# Patient Record
Sex: MALE | Race: WHITE | NOT HISPANIC OR LATINO | Employment: UNEMPLOYED | URBAN - METROPOLITAN AREA
[De-identification: names, ages, dates, MRNs, and addresses within clinical notes are randomized per-mention and may not be internally consistent; named-entity substitution may affect disease eponyms.]

---

## 2019-01-09 ENCOUNTER — APPOINTMENT (EMERGENCY)
Dept: CT IMAGING | Facility: HOSPITAL | Age: 74
DRG: 871 | End: 2019-01-09
Payer: COMMERCIAL

## 2019-01-09 ENCOUNTER — APPOINTMENT (INPATIENT)
Dept: RADIOLOGY | Facility: HOSPITAL | Age: 74
DRG: 871 | End: 2019-01-09
Payer: COMMERCIAL

## 2019-01-09 ENCOUNTER — APPOINTMENT (INPATIENT)
Dept: CT IMAGING | Facility: HOSPITAL | Age: 74
DRG: 871 | End: 2019-01-09
Payer: COMMERCIAL

## 2019-01-09 ENCOUNTER — HOSPITAL ENCOUNTER (INPATIENT)
Facility: HOSPITAL | Age: 74
LOS: 13 days | Discharge: NURSING, REHAB OR LONG-TERM CARE | DRG: 871 | End: 2019-01-22
Attending: EMERGENCY MEDICINE | Admitting: INTERNAL MEDICINE
Payer: COMMERCIAL

## 2019-01-09 DIAGNOSIS — R77.8 ELEVATED TROPONIN I LEVEL: ICD-10-CM

## 2019-01-09 DIAGNOSIS — N40.1 URINARY RETENTION DUE TO BENIGN PROSTATIC HYPERPLASIA: ICD-10-CM

## 2019-01-09 DIAGNOSIS — R77.8 ELEVATED TROPONIN: ICD-10-CM

## 2019-01-09 DIAGNOSIS — R65.10 SIRS (SYSTEMIC INFLAMMATORY RESPONSE SYNDROME) (HCC): ICD-10-CM

## 2019-01-09 DIAGNOSIS — R33.8 URINARY RETENTION DUE TO BENIGN PROSTATIC HYPERPLASIA: ICD-10-CM

## 2019-01-09 DIAGNOSIS — A41.9 SEPSIS, DUE TO UNSPECIFIED ORGANISM: ICD-10-CM

## 2019-01-09 DIAGNOSIS — R06.02 SOB (SHORTNESS OF BREATH): Primary | ICD-10-CM

## 2019-01-09 DIAGNOSIS — R53.83 FATIGUE: ICD-10-CM

## 2019-01-09 DIAGNOSIS — N39.0 UTI (URINARY TRACT INFECTION): ICD-10-CM

## 2019-01-09 DIAGNOSIS — R50.9 FEVER: ICD-10-CM

## 2019-01-09 DIAGNOSIS — A41.9 SEPSIS (HCC): ICD-10-CM

## 2019-01-09 PROBLEM — E87.2 LACTIC ACIDOSIS: Status: ACTIVE | Noted: 2019-01-09

## 2019-01-09 PROBLEM — N30.00 ACUTE CYSTITIS WITHOUT HEMATURIA: Status: ACTIVE | Noted: 2019-01-09

## 2019-01-09 PROBLEM — Z72.0 TOBACCO ABUSE: Chronic | Status: ACTIVE | Noted: 2019-01-09

## 2019-01-09 LAB
ALBUMIN SERPL BCP-MCNC: 3.4 G/DL (ref 3.5–5)
ALP SERPL-CCNC: 139 U/L (ref 46–116)
ALT SERPL W P-5'-P-CCNC: 32 U/L (ref 12–78)
ANION GAP BLD CALC-SCNC: 21 MMOL/L (ref 4–13)
ANION GAP SERPL CALCULATED.3IONS-SCNC: 14 MMOL/L (ref 4–13)
ANION GAP SERPL CALCULATED.3IONS-SCNC: 15 MMOL/L (ref 4–13)
APTT PPP: 31 SECONDS (ref 26–38)
ARTERIAL PATENCY WRIST A: YES
AST SERPL W P-5'-P-CCNC: 26 U/L (ref 5–45)
BACTERIA UR QL AUTO: ABNORMAL /HPF
BASE EXCESS BLDA CALC-SCNC: -3.2 MMOL/L
BASOPHILS # BLD AUTO: 0.01 THOUSANDS/ΜL (ref 0–0.1)
BASOPHILS # BLD AUTO: 0.02 THOUSANDS/ΜL (ref 0–0.1)
BASOPHILS NFR BLD AUTO: 0 % (ref 0–1)
BASOPHILS NFR BLD AUTO: 0 % (ref 0–1)
BILIRUB SERPL-MCNC: 0.82 MG/DL (ref 0.2–1)
BILIRUB UR QL STRIP: NEGATIVE
BUN BLD-MCNC: 12 MG/DL (ref 5–25)
BUN SERPL-MCNC: 14 MG/DL (ref 5–25)
BUN SERPL-MCNC: 16 MG/DL (ref 5–25)
CA-I BLD-SCNC: 1.13 MMOL/L (ref 1.12–1.32)
CALCIUM SERPL-MCNC: 8.6 MG/DL (ref 8.3–10.1)
CALCIUM SERPL-MCNC: 9 MG/DL (ref 8.3–10.1)
CHLORIDE BLD-SCNC: 96 MMOL/L (ref 100–108)
CHLORIDE SERPL-SCNC: 101 MMOL/L (ref 100–108)
CHLORIDE SERPL-SCNC: 95 MMOL/L (ref 100–108)
CLARITY UR: ABNORMAL
CO2 SERPL-SCNC: 16 MMOL/L (ref 21–32)
CO2 SERPL-SCNC: 25 MMOL/L (ref 21–32)
COLOR UR: YELLOW
CREAT BLD-MCNC: 0.8 MG/DL (ref 0.6–1.3)
CREAT SERPL-MCNC: 1.05 MG/DL (ref 0.6–1.3)
CREAT SERPL-MCNC: 1.29 MG/DL (ref 0.6–1.3)
EOSINOPHIL # BLD AUTO: 0.01 THOUSAND/ΜL (ref 0–0.61)
EOSINOPHIL # BLD AUTO: 0.05 THOUSAND/ΜL (ref 0–0.61)
EOSINOPHIL NFR BLD AUTO: 0 % (ref 0–6)
EOSINOPHIL NFR BLD AUTO: 2 % (ref 0–6)
ERYTHROCYTE [DISTWIDTH] IN BLOOD BY AUTOMATED COUNT: 12.8 % (ref 11.6–15.1)
ERYTHROCYTE [DISTWIDTH] IN BLOOD BY AUTOMATED COUNT: 12.9 % (ref 11.6–15.1)
FLUAV AG SPEC QL IA: NEGATIVE
FLUAV AG SPEC QL: NORMAL
FLUBV AG SPEC QL IA: NEGATIVE
FLUBV AG SPEC QL: NORMAL
GFR SERPL CREATININE-BSD FRML MDRD: 55 ML/MIN/1.73SQ M
GFR SERPL CREATININE-BSD FRML MDRD: 71 ML/MIN/1.73SQ M
GFR SERPL CREATININE-BSD FRML MDRD: 90 ML/MIN/1.73SQ M
GLUCOSE SERPL-MCNC: 132 MG/DL (ref 65–140)
GLUCOSE SERPL-MCNC: 140 MG/DL (ref 65–140)
GLUCOSE SERPL-MCNC: 149 MG/DL (ref 65–140)
GLUCOSE SERPL-MCNC: 170 MG/DL (ref 65–140)
GLUCOSE SERPL-MCNC: 208 MG/DL (ref 65–140)
GLUCOSE SERPL-MCNC: 212 MG/DL (ref 65–140)
GLUCOSE UR STRIP-MCNC: ABNORMAL MG/DL
HCO3 BLDA-SCNC: 19.8 MMOL/L (ref 22–28)
HCT VFR BLD AUTO: 37.6 % (ref 36.5–49.3)
HCT VFR BLD AUTO: 42 % (ref 36.5–49.3)
HCT VFR BLD CALC: 43 % (ref 36.5–49.3)
HGB BLD-MCNC: 12.6 G/DL (ref 12–17)
HGB BLD-MCNC: 14.3 G/DL (ref 12–17)
HGB BLDA-MCNC: 14.6 G/DL (ref 12–17)
HGB UR QL STRIP.AUTO: ABNORMAL
IMM GRANULOCYTES # BLD AUTO: 0 THOUSAND/UL (ref 0–0.2)
IMM GRANULOCYTES # BLD AUTO: 0.08 THOUSAND/UL (ref 0–0.2)
IMM GRANULOCYTES NFR BLD AUTO: 0 % (ref 0–2)
IMM GRANULOCYTES NFR BLD AUTO: 1 % (ref 0–2)
INR PPP: 1.16 (ref 0.86–1.17)
KETONES UR STRIP-MCNC: NEGATIVE MG/DL
LACTATE SERPL-SCNC: 2.4 MMOL/L (ref 0.5–2)
LACTATE SERPL-SCNC: 2.7 MMOL/L (ref 0.5–2)
LACTATE SERPL-SCNC: 2.9 MMOL/L (ref 0.5–2)
LACTATE SERPL-SCNC: 2.9 MMOL/L (ref 0.5–2)
LACTATE SERPL-SCNC: 3 MMOL/L (ref 0.5–2)
LACTATE SERPL-SCNC: 3.1 MMOL/L (ref 0.5–2)
LACTATE SERPL-SCNC: 3.1 MMOL/L (ref 0.5–2)
LACTATE SERPL-SCNC: 4 MMOL/L (ref 0.5–2)
LACTATE SERPL-SCNC: 4.7 MMOL/L (ref 0.5–2)
LACTATE SERPL-SCNC: 4.8 MMOL/L (ref 0.5–2)
LEUKOCYTE ESTERASE UR QL STRIP: ABNORMAL
LYMPHOCYTES # BLD AUTO: 0.22 THOUSANDS/ΜL (ref 0.6–4.47)
LYMPHOCYTES # BLD AUTO: 0.48 THOUSANDS/ΜL (ref 0.6–4.47)
LYMPHOCYTES NFR BLD AUTO: 18 % (ref 14–44)
LYMPHOCYTES NFR BLD AUTO: 3 % (ref 14–44)
MCH RBC QN AUTO: 31.8 PG (ref 26.8–34.3)
MCH RBC QN AUTO: 31.9 PG (ref 26.8–34.3)
MCHC RBC AUTO-ENTMCNC: 33.5 G/DL (ref 31.4–37.4)
MCHC RBC AUTO-ENTMCNC: 34 G/DL (ref 31.4–37.4)
MCV RBC AUTO: 94 FL (ref 82–98)
MCV RBC AUTO: 95 FL (ref 82–98)
MONOCYTES # BLD AUTO: 0.02 THOUSAND/ΜL (ref 0.17–1.22)
MONOCYTES # BLD AUTO: 0.06 THOUSAND/ΜL (ref 0.17–1.22)
MONOCYTES NFR BLD AUTO: 1 % (ref 4–12)
MONOCYTES NFR BLD AUTO: 1 % (ref 4–12)
NASAL CANNULA: 3
NEUTROPHILS # BLD AUTO: 2.06 THOUSANDS/ΜL (ref 1.85–7.62)
NEUTROPHILS # BLD AUTO: 7.07 THOUSANDS/ΜL (ref 1.85–7.62)
NEUTS SEG NFR BLD AUTO: 79 % (ref 43–75)
NEUTS SEG NFR BLD AUTO: 95 % (ref 43–75)
NITRITE UR QL STRIP: NEGATIVE
NON-SQ EPI CELLS URNS QL MICRO: ABNORMAL /HPF
NRBC BLD AUTO-RTO: 0 /100 WBCS
NRBC BLD AUTO-RTO: 0 /100 WBCS
NT-PROBNP SERPL-MCNC: 2206 PG/ML
O2 CT BLDA-SCNC: 17.4 ML/DL (ref 16–23)
OXYHGB MFR BLDA: 95.3 % (ref 94–97)
PCO2 BLD: 23 MMOL/L (ref 21–32)
PCO2 BLDA: 29.7 MM HG (ref 36–44)
PH BLDA: 7.44 [PH] (ref 7.35–7.45)
PH UR STRIP.AUTO: 6.5 [PH] (ref 4.5–8)
PLATELET # BLD AUTO: 144 THOUSANDS/UL (ref 149–390)
PLATELET # BLD AUTO: 168 THOUSANDS/UL (ref 149–390)
PMV BLD AUTO: 8.7 FL (ref 8.9–12.7)
PMV BLD AUTO: 9.3 FL (ref 8.9–12.7)
PO2 BLDA: 114.6 MM HG (ref 75–129)
POTASSIUM BLD-SCNC: 3.4 MMOL/L (ref 3.5–5.3)
POTASSIUM SERPL-SCNC: 3.1 MMOL/L (ref 3.5–5.3)
POTASSIUM SERPL-SCNC: 3.5 MMOL/L (ref 3.5–5.3)
PROCALCITONIN SERPL-MCNC: 42.51 NG/ML
PROCALCITONIN SERPL-MCNC: 91.49 NG/ML
PROT SERPL-MCNC: 7.4 G/DL (ref 6.4–8.2)
PROT UR STRIP-MCNC: ABNORMAL MG/DL
PROTHROMBIN TIME: 14.9 SECONDS (ref 11.8–14.2)
RBC # BLD AUTO: 3.96 MILLION/UL (ref 3.88–5.62)
RBC # BLD AUTO: 4.48 MILLION/UL (ref 3.88–5.62)
RBC #/AREA URNS AUTO: ABNORMAL /HPF
RSV B RNA SPEC QL NAA+PROBE: NORMAL
SODIUM BLD-SCNC: 136 MMOL/L (ref 136–145)
SODIUM SERPL-SCNC: 132 MMOL/L (ref 136–145)
SODIUM SERPL-SCNC: 134 MMOL/L (ref 136–145)
SP GR UR STRIP.AUTO: 1.02 (ref 1–1.03)
SPECIMEN SOURCE: ABNORMAL
SPECIMEN SOURCE: ABNORMAL
TROPONIN I SERPL-MCNC: 0.13 NG/ML
TROPONIN I SERPL-MCNC: 10.51 NG/ML
TROPONIN I SERPL-MCNC: 11.55 NG/ML
TROPONIN I SERPL-MCNC: 4.25 NG/ML
UROBILINOGEN UR QL STRIP.AUTO: 0.2 E.U./DL
WBC # BLD AUTO: 2.62 THOUSAND/UL (ref 4.31–10.16)
WBC # BLD AUTO: 7.46 THOUSAND/UL (ref 4.31–10.16)
WBC #/AREA URNS AUTO: ABNORMAL /HPF

## 2019-01-09 PROCEDURE — 80047 BASIC METABLC PNL IONIZED CA: CPT

## 2019-01-09 PROCEDURE — 93005 ELECTROCARDIOGRAM TRACING: CPT

## 2019-01-09 PROCEDURE — 85025 COMPLETE CBC W/AUTO DIFF WBC: CPT | Performed by: EMERGENCY MEDICINE

## 2019-01-09 PROCEDURE — 83880 ASSAY OF NATRIURETIC PEPTIDE: CPT | Performed by: EMERGENCY MEDICINE

## 2019-01-09 PROCEDURE — 80053 COMPREHEN METABOLIC PANEL: CPT | Performed by: EMERGENCY MEDICINE

## 2019-01-09 PROCEDURE — 36600 WITHDRAWAL OF ARTERIAL BLOOD: CPT

## 2019-01-09 PROCEDURE — 83605 ASSAY OF LACTIC ACID: CPT | Performed by: HOSPITALIST

## 2019-01-09 PROCEDURE — 96365 THER/PROPH/DIAG IV INF INIT: CPT

## 2019-01-09 PROCEDURE — 84484 ASSAY OF TROPONIN QUANT: CPT | Performed by: PHYSICIAN ASSISTANT

## 2019-01-09 PROCEDURE — 85025 COMPLETE CBC W/AUTO DIFF WBC: CPT | Performed by: PHYSICIAN ASSISTANT

## 2019-01-09 PROCEDURE — 99223 1ST HOSP IP/OBS HIGH 75: CPT | Performed by: HOSPITALIST

## 2019-01-09 PROCEDURE — 99221 1ST HOSP IP/OBS SF/LOW 40: CPT | Performed by: INTERNAL MEDICINE

## 2019-01-09 PROCEDURE — 96360 HYDRATION IV INFUSION INIT: CPT

## 2019-01-09 PROCEDURE — 84484 ASSAY OF TROPONIN QUANT: CPT | Performed by: EMERGENCY MEDICINE

## 2019-01-09 PROCEDURE — 74176 CT ABD & PELVIS W/O CONTRAST: CPT

## 2019-01-09 PROCEDURE — 80048 BASIC METABOLIC PNL TOTAL CA: CPT | Performed by: PHYSICIAN ASSISTANT

## 2019-01-09 PROCEDURE — 85610 PROTHROMBIN TIME: CPT | Performed by: EMERGENCY MEDICINE

## 2019-01-09 PROCEDURE — 87631 RESP VIRUS 3-5 TARGETS: CPT | Performed by: EMERGENCY MEDICINE

## 2019-01-09 PROCEDURE — 87077 CULTURE AEROBIC IDENTIFY: CPT | Performed by: EMERGENCY MEDICINE

## 2019-01-09 PROCEDURE — 87086 URINE CULTURE/COLONY COUNT: CPT | Performed by: EMERGENCY MEDICINE

## 2019-01-09 PROCEDURE — 71275 CT ANGIOGRAPHY CHEST: CPT

## 2019-01-09 PROCEDURE — 82948 REAGENT STRIP/BLOOD GLUCOSE: CPT

## 2019-01-09 PROCEDURE — 85014 HEMATOCRIT: CPT

## 2019-01-09 PROCEDURE — 71045 X-RAY EXAM CHEST 1 VIEW: CPT

## 2019-01-09 PROCEDURE — 99285 EMERGENCY DEPT VISIT HI MDM: CPT

## 2019-01-09 PROCEDURE — 84484 ASSAY OF TROPONIN QUANT: CPT | Performed by: HOSPITALIST

## 2019-01-09 PROCEDURE — 83605 ASSAY OF LACTIC ACID: CPT | Performed by: EMERGENCY MEDICINE

## 2019-01-09 PROCEDURE — 84145 PROCALCITONIN (PCT): CPT | Performed by: HOSPITALIST

## 2019-01-09 PROCEDURE — 83605 ASSAY OF LACTIC ACID: CPT | Performed by: PHYSICIAN ASSISTANT

## 2019-01-09 PROCEDURE — 85730 THROMBOPLASTIN TIME PARTIAL: CPT | Performed by: EMERGENCY MEDICINE

## 2019-01-09 PROCEDURE — 36415 COLL VENOUS BLD VENIPUNCTURE: CPT | Performed by: EMERGENCY MEDICINE

## 2019-01-09 PROCEDURE — 82805 BLOOD GASES W/O2 SATURATION: CPT | Performed by: EMERGENCY MEDICINE

## 2019-01-09 PROCEDURE — 84145 PROCALCITONIN (PCT): CPT | Performed by: PHYSICIAN ASSISTANT

## 2019-01-09 PROCEDURE — 87040 BLOOD CULTURE FOR BACTERIA: CPT | Performed by: EMERGENCY MEDICINE

## 2019-01-09 PROCEDURE — 87186 SC STD MICRODIL/AGAR DIL: CPT | Performed by: EMERGENCY MEDICINE

## 2019-01-09 PROCEDURE — 96361 HYDRATE IV INFUSION ADD-ON: CPT

## 2019-01-09 PROCEDURE — 99357 PR PROLONGED SERV,INPATIENT,EA ADD 30 MIN: CPT | Performed by: HOSPITALIST

## 2019-01-09 PROCEDURE — 81001 URINALYSIS AUTO W/SCOPE: CPT | Performed by: EMERGENCY MEDICINE

## 2019-01-09 RX ORDER — NICOTINE 21 MG/24HR
1 PATCH, TRANSDERMAL 24 HOURS TRANSDERMAL DAILY
Status: DISCONTINUED | OUTPATIENT
Start: 2019-01-09 | End: 2019-01-22 | Stop reason: HOSPADM

## 2019-01-09 RX ORDER — SODIUM CHLORIDE, SODIUM LACTATE, POTASSIUM CHLORIDE, CALCIUM CHLORIDE 600; 310; 30; 20 MG/100ML; MG/100ML; MG/100ML; MG/100ML
50 INJECTION, SOLUTION INTRAVENOUS CONTINUOUS
Status: DISCONTINUED | OUTPATIENT
Start: 2019-01-09 | End: 2019-01-13

## 2019-01-09 RX ORDER — POTASSIUM CHLORIDE 20 MEQ/1
40 TABLET, EXTENDED RELEASE ORAL ONCE
Status: COMPLETED | OUTPATIENT
Start: 2019-01-09 | End: 2019-01-09

## 2019-01-09 RX ORDER — ACETAMINOPHEN 325 MG/1
650 TABLET ORAL EVERY 6 HOURS PRN
Status: DISCONTINUED | OUTPATIENT
Start: 2019-01-09 | End: 2019-01-22 | Stop reason: HOSPADM

## 2019-01-09 RX ORDER — IBUPROFEN 600 MG/1
600 TABLET ORAL ONCE
Status: COMPLETED | OUTPATIENT
Start: 2019-01-09 | End: 2019-01-09

## 2019-01-09 RX ORDER — HEPARIN SODIUM 5000 [USP'U]/ML
5000 INJECTION, SOLUTION INTRAVENOUS; SUBCUTANEOUS EVERY 8 HOURS SCHEDULED
Status: DISCONTINUED | OUTPATIENT
Start: 2019-01-09 | End: 2019-01-12

## 2019-01-09 RX ORDER — ACETAMINOPHEN 650 MG/1
650 SUPPOSITORY RECTAL ONCE
Status: COMPLETED | OUTPATIENT
Start: 2019-01-09 | End: 2019-01-09

## 2019-01-09 RX ORDER — SODIUM CHLORIDE 9 MG/ML
125 INJECTION, SOLUTION INTRAVENOUS CONTINUOUS
Status: DISCONTINUED | OUTPATIENT
Start: 2019-01-09 | End: 2019-01-09

## 2019-01-09 RX ADMIN — IOHEXOL 85 ML: 350 INJECTION, SOLUTION INTRAVENOUS at 03:13

## 2019-01-09 RX ADMIN — HEPARIN SODIUM 5000 UNITS: 5000 INJECTION INTRAVENOUS; SUBCUTANEOUS at 06:27

## 2019-01-09 RX ADMIN — CEFTRIAXONE 1000 MG: 1 INJECTION, POWDER, FOR SOLUTION INTRAMUSCULAR; INTRAVENOUS at 03:31

## 2019-01-09 RX ADMIN — POTASSIUM CHLORIDE 40 MEQ: 1500 TABLET, EXTENDED RELEASE ORAL at 13:56

## 2019-01-09 RX ADMIN — SODIUM CHLORIDE, SODIUM LACTATE, POTASSIUM CHLORIDE, AND CALCIUM CHLORIDE 125 ML/HR: .6; .31; .03; .02 INJECTION, SOLUTION INTRAVENOUS at 20:25

## 2019-01-09 RX ADMIN — CEFEPIME HYDROCHLORIDE 1000 MG: 1 INJECTION, POWDER, FOR SOLUTION INTRAMUSCULAR; INTRAVENOUS at 10:49

## 2019-01-09 RX ADMIN — SODIUM CHLORIDE 500 ML: 0.9 INJECTION, SOLUTION INTRAVENOUS at 09:19

## 2019-01-09 RX ADMIN — ACETAMINOPHEN 650 MG: 325 TABLET ORAL at 19:29

## 2019-01-09 RX ADMIN — SODIUM CHLORIDE, SODIUM LACTATE, POTASSIUM CHLORIDE, AND CALCIUM CHLORIDE 125 ML/HR: .6; .31; .03; .02 INJECTION, SOLUTION INTRAVENOUS at 14:44

## 2019-01-09 RX ADMIN — CEFEPIME HYDROCHLORIDE 1000 MG: 1 INJECTION, POWDER, FOR SOLUTION INTRAMUSCULAR; INTRAVENOUS at 21:42

## 2019-01-09 RX ADMIN — NICOTINE 1 PATCH: 21 PATCH, EXTENDED RELEASE TRANSDERMAL at 09:12

## 2019-01-09 RX ADMIN — HEPARIN SODIUM 5000 UNITS: 5000 INJECTION INTRAVENOUS; SUBCUTANEOUS at 13:56

## 2019-01-09 RX ADMIN — IBUPROFEN 600 MG: 600 TABLET ORAL at 04:28

## 2019-01-09 RX ADMIN — IOHEXOL 50 ML: 240 INJECTION, SOLUTION INTRATHECAL; INTRAVASCULAR; INTRAVENOUS; ORAL at 17:45

## 2019-01-09 RX ADMIN — SODIUM CHLORIDE 1000 ML: 0.9 INJECTION, SOLUTION INTRAVENOUS at 02:40

## 2019-01-09 RX ADMIN — ACETAMINOPHEN 650 MG: 650 SUPPOSITORY RECTAL at 02:45

## 2019-01-09 RX ADMIN — SODIUM CHLORIDE, SODIUM LACTATE, POTASSIUM CHLORIDE, AND CALCIUM CHLORIDE 75 ML/HR: .6; .31; .03; .02 INJECTION, SOLUTION INTRAVENOUS at 05:58

## 2019-01-09 RX ADMIN — INSULIN LISPRO 1 UNITS: 100 INJECTION, SOLUTION INTRAVENOUS; SUBCUTANEOUS at 12:52

## 2019-01-09 RX ADMIN — HEPARIN SODIUM 5000 UNITS: 5000 INJECTION INTRAVENOUS; SUBCUTANEOUS at 21:42

## 2019-01-09 RX ADMIN — SODIUM CHLORIDE 1000 ML: 0.9 INJECTION, SOLUTION INTRAVENOUS at 03:38

## 2019-01-09 NOTE — CONSULTS
Consult - Cardiology   Yossi Triana 72 y o  male MRN: [id number]  Unit/Bed#: E4 -01 Encounter: [id number]        Reason For Consult:  Elevated troponin               Assessment and Plan:     1  SIRS-Probable sepsis:  Ongoing workup with undefined source thus far  2  Elevated troponin/non STEMI - probably type 2 secondary to demand ischemia in the setting of sepsis and underlying CAD  3  CAD, remote CABG x2 (1990s):  Details of CABG unclear  Current CT scan suggest significant coronary calcifications  4  Right bundle branch block  5  Possible cardiomyopathy:  Patient offers suggestion of LV dysfunction though details unknown and no records currently available    · Will request records of [facility] for insight into the patient's cardiac history  · Patient reports having had an echocardiogram in the last few months at the office of his usual cardiologist - as such we will defer immediately repeating one unless the patient should have some other sign of ACS or hemodynamic instability  · In the absence of chest pain and ECG changes would not heparinize at this time  Continue aspirin  No beta-blocker in the setting of hypotension  · Check ECG now and p r n  for any complaint of chest discomfort  · Pt to be transferred to higher level of care (stepdown ICU) in light of hypotension, lactic acidosis  History Of Present Illness:  Mr Thom Tinsley is a 72-year-old gentleman who is routinely followed by a PCP not affiliated with our [facility]  He is additionally cared for by Dr Nadya Dias of The [address] [place] [address] Drive and has not had prior care in our system and for these reasons there are no records available for detailed review  This gentleman has diabetes, hypertension and he tells me that he had coronary artery bypass grafting x2 in the early 1990s    He is unsure of the vessels bypassed, and while perhaps not entirely reliable, review of his current radiograph does not show obvious signs of internal mammary artery harvest/use  He reports having had no catheterization since his bypass surgery  He suggests the possibility of a cardiomyopathy without greater detail  He denies any known valvular disease or dysrhythmia and states he has not been hospitalized for any cardiac problems in the recent past   He probably has COPD and continues to smoke tobacco currently consuming approximately 1 pack daily (had smoked 3 packs per day at peak consumption)    Presently the patient describes a few days of constipation and what sounds like a 1-2 day history of anal discomfort  Though he struggles to characterize this he states it is more of a perianal discomfort as compared to a deep rectal or perineal discomfort  He has had decreased urinary output and dysuria denying any sign of blood or penile drainage  On the day of admission the patient describes development of subjective fever with shaking chills  As this had escalated he ultimately called an ambulance  On arrival patient's white cell count was 2 6 later repeated at 7 4  Chemistry shows hyponatremia -132  Urinalysis shows cloudy urine with greater than 1000 mg per dL of glucose, 30 mg/dL of protein, moderate blood, a small amount of leukocytes with  innumerable bacteria with negative nitrite  Cardiac biomarkers showed initial troponin of 0 13 followed by 4 25 and most recently 11 5  ProBNP level was 2206  Earlier this morning the patient's temperature apoorva to a peak of 104 1  In the last few hours he has had some hypotension with SBP  and now some rising his lactic acid (4 8)  He denies any recent chest pain though he states he has, for years, intermittently taken sublingual nitrates for jaw pain - typically twice per month        Past Medical History:        Past Medical History:   Diagnosis Date    Diabetes mellitus ([address] )     Hypertension     Past Surgical History:   Procedure Laterality Date    CORONARY ARTERY BYPASS GRAFT          Allergy: No Known Allergies    Medications:       Prior to Admission medications    Not on File       Family History:     History reviewed  No pertinent family history  Social History:       Social History     Social History    Marital status: Unknown     Spouse name: N/A    Number of children: N/A    Years of education: N/A     Social History Main Topics    Smoking status: Current Every Day Smoker    Smokeless tobacco: None    Alcohol use None    Drug use: Unknown    Sexual activity: Not Asked     Other Topics Concern    None     Social History Narrative    None       ROS:  Symptoms per HPI  Mild chronic exertional dyspnea without recent worsening  Chronic and intermittent mild chest and jaw pain-occurring approximately twice monthly for years well managed with intermittent use of sublingual nitrates  One pack per day tobacco use  Nondrinker  He denies abdominal pain, suprapubic distension, back pain  Remainder review of systems is negative    Exam:  General:  Alert, normally conversant, patient appears slightly uncomfortable related to peroneal or perianal discomfort  Perhaps slightly diaphoretic  Head: Normocephalic, atraumatic  Eyes:  EOMI  Pupils - equal, round, reactive to accomodation  No icterus  Normal Conjunctiva  Oropharynx:  Moist without lesion  Neck:  No gross bruit, JVD, thyromegaly, or lymphadenopathy  Heart:  Regular with controlled rate  No rub nor pathologic murmur  Lungs: Moderate excursion air exchange  No rales  Few coarse breath sounds and scant end-expiratory wheezes  Abdomen:  Soft and seemingly nontender with normal bowel sounds  No organomegaly or mass  Lower Limbs:  No edema  Pulses[de-identified]  RLE - DP:  1-2+                 LLE - DP:  1-2+  Musculoskeletal: Independent movement of limbs observed, Formal ROM and strength eval not performed  Neurologic:    Oriented to: person , place, situation       Cranial Nerves: grossly intact - vision, smell, taste, and hearing  were not tested  Motor function:grossly normal, symmetric   Sensation: Was not tested    DATA:      ECG:                 Normal sinus rhythm with ventricular rate approximately 100 beats per minute  Right bundle branch block morphology with no gross ischemic change  Telemetry:  Normal sinus rhythm with heart rate 90s               Weights: Wt Readings from Last 3 Encounters:   01/09/19 71 4 kg (157 lb 6 5 oz)   , Body mass index is 25 41 kg/m²           Lab Studies:      Results from last 7 days  Lab Units 01/09/19  0635 01/09/19 0237   TROPONIN I ng/mL 4 25* 0 13*            Results from last 7 days  Lab Units 01/09/19  0635 01/09/19  0241 01/09/19 0237   WBC Thousand/uL 7 46  --  2 62*   HEMOGLOBIN g/dL 12 6  --  14 3   I STAT HEMOGLOBIN g/dl  --  14 6  --    HEMATOCRIT % 37 6  --  42 0   HEMATOCRIT, ISTAT %  --  43  --    PLATELETS Thousands/uL 144*  --  168   ,   Results from last 7 days  Lab Units 01/09/19  0635 01/09/19  0241 01/09/19 0237   POTASSIUM mmol/L 3 1*  --  3 5   CHLORIDE mmol/L 101  --  95*   CO2 mmol/L 16*  --  25   CO2, I-STAT mmol/L  --  23  --    BUN mg/dL 16  --  14   CREATININE mg/dL 1 29  --  1 05   CALCIUM mg/dL 8 6  --  9 0   ALK PHOS U/L  --   --  139*   ALT U/L  --   --  32   AST U/L  --   --  26   GLUCOSE, ISTAT mg/dl  --  212*  --

## 2019-01-09 NOTE — PROGRESS NOTES
Notified attending of not being able to complete CT scan abd  At this time, but later on due to the close administration of IV dye  The prep will begin around 1500

## 2019-01-09 NOTE — ED PROVIDER NOTES
History  Chief Complaint   Patient presents with    Shortness of Breath     Pt brought in via ems from home with sob that started tonight  Pt has cough, nasal congestion, fever at home  73 yo male who presents with resp distress  Pt is unable to communicate and SOB, lungs seem clear, temp 102 8, RR 36  History provided by:  EMS personnel and patient  History limited by:  Severe respiratory distress   used: No    Shortness of Breath   Severity:  Moderate  Onset quality:  Unable to specify  Associated symptoms: fever        None       Past Medical History:   Diagnosis Date    Diabetes mellitus ([facility] )     Hypertension        Past Surgical History:   Procedure Laterality Date    CORONARY ARTERY BYPASS GRAFT         History reviewed  No pertinent family history  I have reviewed and agree with the history as documented  Social History   Substance Use Topics    Smoking status: Current Every Day Smoker    Smokeless tobacco: Not on file    Alcohol use Not on file        Review of Systems   Unable to perform ROS: Other (septic, grunting, not talking)   Constitutional: Positive for chills, fatigue and fever  Respiratory: Positive for shortness of breath  Physical Exam  Physical Exam   Constitutional:   Disheveled, smells of stale urine   HENT:   MM tachy   Neck: Neck supple  Cardiovascular: Tachycardia present  No murmur heard  Pulmonary/Chest: He is in respiratory distress (tachypnea)  Lungs clear   Abdominal: There is no tenderness  Musculoskeletal: He exhibits no edema  Neurological: He is alert  Skin: Capillary refill takes less than 2 seconds  Nursing note and vitals reviewed        Vital Signs  ED Triage Vitals   Temperature Pulse Respirations Blood Pressure SpO2   01/09/19 0233 01/09/19 0233 01/09/19 0233 01/09/19 0233 01/09/19 0233   (!) 102 8 °F (39 3 °C) 105 (!) 32 147/65 92 %      Temp Source Heart Rate Source Patient Position - Orthostatic VS BP Location FiO2 (%)   01/09/19 0233 01/09/19 0233 01/09/19 0455 01/09/19 0233 --   Oral Monitor Lying Right arm       Pain Score       01/09/19 0455       No Pain           Vitals:    01/10/19 0207 01/10/19 0241 01/10/19 0307 01/10/19 0335   BP: 121/54  130/62    Pulse:  92  96   Patient Position - Orthostatic VS:           Visual Acuity  Visual Acuity      Most Recent Value   L Pupil Size (mm)  2   R Pupil Size (mm)  2   L Pupil Shape  Round   R Pupil Shape  Round          ED Medications  Medications   lactated ringers infusion (125 mL/hr Intravenous New Bag 1/10/19 0029)   nicotine (NICODERM CQ) 21 mg/24 hr TD 24 hr patch 1 patch ( Transdermal MAR Unhold 1/9/19 1115)   heparin (porcine) subcutaneous injection 5,000 Units (5,000 Units Subcutaneous Given 1/9/19 2142)   acetaminophen (TYLENOL) tablet 650 mg (650 mg Oral Given 1/9/19 1929)   cefepime (MAXIPIME) 1,000 mg in dextrose 5 % 50 mL IVPB (0 mg Intravenous Stopped 1/9/19 2232)   insulin lispro (HumaLOG) 100 units/mL subcutaneous injection 1-5 Units (1 Units Subcutaneous Not Given 1/9/19 1629)   insulin lispro (HumaLOG) 100 units/mL subcutaneous injection 1-5 Units (1 Units Subcutaneous Not Given 1/9/19 2142)   sodium chloride 0 9 % bolus 1,000 mL (0 mL Intravenous Stopped 1/9/19 0347)   acetaminophen (TYLENOL) rectal suppository 650 mg (650 mg Rectal Given 1/9/19 0245)   iohexol (OMNIPAQUE) 350 MG/ML injection (SINGLE-DOSE) 85 mL (85 mL Intravenous Given 1/9/19 0313)   ceftriaxone (ROCEPHIN) 1 g/50 mL in dextrose IVPB (0 mg Intravenous Stopped 1/9/19 0401)   sodium chloride 0 9 % bolus 1,000 mL (0 mL Intravenous Stopped 1/9/19 0428)   ibuprofen (MOTRIN) tablet 600 mg (600 mg Oral Given 1/9/19 0428)   sodium chloride 0 9 % bolus 500 mL (0 mL Intravenous Stopped 1/9/19 1027)   potassium chloride (K-DUR,KLOR-CON) CR tablet 40 mEq (40 mEq Oral Given 1/9/19 1356)   iohexol (OMNIPAQUE) 240 MG/ML solution 50 mL (50 mL Oral Given 1/9/19 8876)       Diagnostic Studies  Results Reviewed     Procedure Component Value Units Date/Time    Blood culture #1 [[id number]] Collected:  01/09/19 0239    Lab Status:  Preliminary result Specimen:  Blood from Arm, Left Updated:  01/09/19 2152     Gram Stain Result Gram negative rods    Blood culture #2 [[id number]] Collected:  01/09/19 0237    Lab Status:  Preliminary result Specimen:  Blood from Arm, Right Updated:  01/09/19 1635     Gram Stain Result Gram negative rods    Procalcitonin [[id number]]  (Abnormal) Collected:  01/09/19 0635    Lab Status:  Final result Specimen:  Blood from Hand, Left Updated:  01/09/19 1109     Procalcitonin 42 51 (H) ng/ml     INFLUENZA A/B AND RSV, PCR [[id number]]  (Normal) Collected:  01/09/19 0237    Lab Status:  Final result Specimen:  Nasopharyngeal from Nasopharyngeal Swab Updated:  01/09/19 0925     INFLU A PCR None Detected     INFLU B PCR None Detected     RSV PCR None Detected    Lactic Acid STAT and in 2 hours if first result greater than 2 [[id number]]  (Abnormal) Collected:  01/09/19 0635    Lab Status:  Final result Specimen:  Blood from Hand, Left Updated:  01/09/19 0719     LACTIC ACID 4 8 (HH) mmol/L     Narrative:         Result may be elevated if tourniquet was used during collection  Troponin I [[id number]]  (Abnormal) Collected:  01/09/19 0635    Lab Status:  Final result Specimen:  Blood from Hand, Left Updated:  01/09/19 0717     Troponin I 4 25 (H) ng/mL     Lactic Acid STAT and in 2 hours if first result greater than 2 [[id number]]     Lab Status:  No result Specimen:  Blood     Troponin I [[id number]]     Lab Status:  No result Specimen:  Blood     Lactic acid, plasma [[id number]]  (Abnormal) Collected:  01/09/19 0415    Lab Status:  Final result Specimen:  Blood from Arm, Right Updated:  01/09/19 0449     LACTIC ACID 4 0 (HH) mmol/L     Narrative:         Result may be elevated if tourniquet was used during collection      Blood gas, arterial [[id number]]  (Abnormal) Collected: 01/09/19 0329    Lab Status:  Final result Specimen:  Blood, Arterial from Brachial, Left Updated:  01/09/19 0339     pH, Arterial 7 441     pCO2, Arterial 29 7 (LL) mm Hg      pO2, Arterial 114 6 mm Hg      HCO3, Arterial 19 8 (L) mmol/L      Base Excess, Arterial -3 2 mmol/L      O2 Content, Arterial 17 4 mL/dL      O2 HGB,Arterial  95 3 %      SOURCE Brachial, Left     ARIELA TEST Yes     Nasal Cannula 3    Lactic acid, plasma [[id number]]  (Abnormal) Collected:  01/09/19 0237    Lab Status:  Final result Specimen:  Blood from Arm, Right Updated:  01/09/19 0331     LACTIC ACID 4 7 (HH) mmol/L     Narrative:         Result may be elevated if tourniquet was used during collection  Rapid Influenza Screen with Reflex PCR [[id number]]  (Normal) Collected:  01/09/19 0237    Lab Status:  Final result Specimen:  Nasopharyngeal from Nasopharyngeal Swab Updated:  01/09/19 0328     Rapid Influenza A Ag Negative     Rapid Influenza B Ag Negative    Urine Microscopic [[id number]]  (Abnormal) Collected:  01/09/19 0304    Lab Status:  Final result Specimen:  Urine from Urine, Straight Cath Updated:  01/09/19 0322     RBC, UA 10-20 (A) /hpf      WBC, UA 30-50 (A) /hpf      Epithelial Cells Occasional /hpf      Bacteria, UA Innumerable (A) /hpf     Urine culture [[id number]] Collected:  01/09/19 0304    Lab Status:   In process Specimen:  Urine from Urine, Straight Cath Updated:  01/09/19 0322    UA w Reflex to Microscopic w Reflex to Culture [[id number]]  (Abnormal) Collected:  01/09/19 0304    Lab Status:  Final result Specimen:  Urine from Urine, Straight Cath Updated:  01/09/19 0310     Color, UA Yellow     Clarity, UA Cloudy     Specific Gravity, UA 1 025     pH, UA 6 5     Leukocytes, UA Small (A)     Nitrite, UA Negative     Protein, UA 30 (1+) (A) mg/dl      Glucose, UA >=1000 (1%) (A) mg/dl      Ketones, UA Negative mg/dl      Urobilinogen, UA 0 2 E U /dl      Bilirubin, UA Negative     Blood, UA Moderate (A)    B-type natriuretic peptide [[id number]]  (Abnormal) Collected:  01/09/19 0237    Lab Status:  Final result Specimen:  Blood from Arm, Right Updated:  01/09/19 0305     NT-proBNP 2,206 (H) pg/mL     Troponin I [[id number]]  (Abnormal) Collected:  01/09/19 0237    Lab Status:  Final result Specimen:  Blood from Arm, Right Updated:  01/09/19 0303     Troponin I 0 13 (H) ng/mL     Comprehensive metabolic panel [[id number]]  (Abnormal) Collected:  01/09/19 0237    Lab Status:  Final result Specimen:  Blood from Arm, Right Updated:  01/09/19 0259     Sodium 134 (L) mmol/L      Potassium 3 5 mmol/L      Chloride 95 (L) mmol/L      CO2 25 mmol/L      ANION GAP 14 (H) mmol/L      BUN 14 mg/dL      Creatinine 1 05 mg/dL      Glucose 208 (H) mg/dL      Calcium 9 0 mg/dL      AST 26 U/L      ALT 32 U/L      Alkaline Phosphatase 139 (H) U/L      Total Protein 7 4 g/dL      Albumin 3 4 (L) g/dL      Total Bilirubin 0 82 mg/dL      eGFR 71 ml/min/1 73sq m     Narrative:         National Kidney Disease Education Program recommendations are as follows:  GFR calculation is accurate only with a steady state creatinine  Chronic Kidney disease less than 60 ml/min/1 73 sq  meters  Kidney failure less than 15 ml/min/1 73 sq  meters      Protime-INR [[id number]]  (Abnormal) Collected:  01/09/19 0237    Lab Status:  Final result Specimen:  Blood from Arm, Right Updated:  01/09/19 0259     Protime 14 9 (H) seconds      INR 1 16    APTT [[id number]]  (Normal) Collected:  01/09/19 0237    Lab Status:  Final result Specimen:  Blood from Arm, Right Updated:  01/09/19 0259     PTT 31 seconds     POCT Chem 8+ [[id number]]  (Abnormal) Collected:  01/09/19 0241    Lab Status:  Final result Updated:  01/09/19 0246     SODIUM, I-STAT 136 mmol/l      Potassium, i-STAT 3 4 (L) mmol/L      Chloride, istat 96 (L) mmol/L      CO2, i-STAT 23 mmol/L      Anion Gap, i-STAT 21 (H) mmol/L      Calcium, Ionized i-STAT 1 13 mmol/L      BUN, I-STAT 12 mg/dl      Creatinine, i-STAT 0 8 mg/dl      eGFR 90 ml/min/1 73sq m      Glucose, i-STAT 212 (H) mg/dl      Hct, i-STAT 43 %      Hgb, i-STAT 14 6 g/dl      Specimen Type VENOUS    CBC and differential [[id number]]  (Abnormal) Collected:  01/09/19 0237    Lab Status:  Final result Specimen:  Blood from Arm, Right Updated:  01/09/19 0244     WBC 2 62 (L) Thousand/uL      RBC 4 48 Million/uL      Hemoglobin 14 3 g/dL      Hematocrit 42 0 %      MCV 94 fL      MCH 31 9 pg      MCHC 34 0 g/dL      RDW 12 8 %      MPV 8 7 (L) fL      Platelets 078 Thousands/uL      nRBC 0 /100 WBCs      Neutrophils Relative 79 (H) %      Immat GRANS % 0 %      Lymphocytes Relative 18 %      Monocytes Relative 1 (L) %      Eosinophils Relative 2 %      Basophils Relative 0 %      Neutrophils Absolute 2 06 Thousands/µL      Immature Grans Absolute 0 00 Thousand/uL      Lymphocytes Absolute 0 48 (L) Thousands/µL      Monocytes Absolute 0 02 (L) Thousand/µL      Eosinophils Absolute 0 05 Thousand/µL      Basophils Absolute 0 01 Thousands/µL                  CT abdomen pelvis wo contrast   Final Result by Judith Faust MD (01/09 1807)         1  Findings suggestive of mild gastritis  No evidence of bowel obstruction, colitis or diverticulitis  2   Suggestion of sludge and/or tiny gallstones in the gallbladder without evidence of acute cholecystitis or biliary obstruction  Workstation performed: [id number]         XR chest portable   Final Result by Catia Carrion DO (01/09 6580)      No acute cardiopulmonary disease  Workstation performed: [id number]         CTA ED chest PE study   Final Result by Chris Nice MD (01/09 7662)      1  Motion limited examination  No evidence of pulmonary embolism to the proximal segmental level  2   Emphysematous changes of lungs with linear atelectasis in the right lower lobe  No focal consolidation     3   A small calcified left hilar lymph node is seen which may be due to sequela of prior granulomatous disease  4   Large noncalcified plaques are seen within the proximal abdominal aorta with areas of ulceration  Workstation performed: [id number]                    Procedures  Procedures       Phone Contacts  ED Phone Contact    ED Course  ED Course as of [place] 10 0338   Wed Jan 09, 2019   0234 Pt seen and examined  73 yo male who presents with resp distress  Pt is unable to communicate and SOB, lungs seem clear, temp 102 8, RR 36  Will give IVF, pr tylenol, check labs, CT chest r/o PE vs pneumonia and swab for flu       0303 Trop 0 13, may be rate related - will continue to trend  BNP and lactate pending  Urine obtained via straight cath  Pt going to CT chest and upon return will get ABG     0311 BNP 2206, no old for comparison  Pt straight cathed - moderate blood, small leuks - awaiting micro     [id number] Micro shows innumerable bacteria and 30-50 WBC  Will give ceftriaxone and admit for urosepsis  Influenza neg  Has not been here - no hx of urine cx noted  6210 Lactate 4 7 - IVF infusing  Sepsis alert called  0340 ABG looks fine  CT pending  0409 CT chest - 1   Motion limited examination   No evidence of pulmonary embolism to the proximal segmental level  2   Emphysematous changes of lungs with linear atelectasis in the right lower lobe   No focal consolidation  3   A small calcified left hilar lymph node is seen which may be due to sequela of prior granulomatous disease  4   Large noncalcified plaques are seen within the proximal abdominal aorta with areas of ulceration  Initial Sepsis Screening     Row Name 01/09/19 0300                Is the patient's history suggestive of a new or worsening infection? (!)  Yes (Proceed)  -SM        Suspected source of infection pneumonia  -SM        Are two or more of the following signs & symptoms of infection both present and new to the patient?  (!)  Yes (Proceed)  -SM        Indicate SIRS criteria Hyperthemia > 38 3C (100 9F); Altered mental status; Tachycardia > 90 bpm;Tachypnea > 20 resp per min  -SM        If the answer is yes to both questions, suspicion of sepsis is present          If severe sepsis is present AND tissue hypoperfusion perists in the hour after fluid resuscitation or lactate > 4, the patient meets criteria for SEPTIC SHOCK          Are any of the following organ dysfunction criteria present within 6 hours of suspected infection and SIRS criteria that are NOT considered to be chronic conditions? (!)  Yes  -SM        Organ dysfunction          Date of presentation of severe sepsis          Time of presentation of severe sepsis          Tissue hypoperfusion persists in the hour after crystalloid fluid administration, evidenced, by either:          Was hypotension present within one hour of the conclusion of crystalloid fluid administration?           Date of presentation of septic shock          Time of presentation of septic shock            User Key  (r) = Recorded By, (t) = Taken By, (c) = Cosigned By    Initials Name Provider Type    WILLEM Mcmanus DO Physician                  MDM  CritCare Time    Disposition  Final diagnoses:   SOB (shortness of breath)   UTI (urinary tract infection)   Sepsis ([facility] )   Fever   Elevated troponin   Fatigue     Time reflects when diagnosis was documented in both MDM as applicable and the Disposition within this note     Time User Action Codes Description Comment    1/9/2019  3:35 AM Jocelin Pat M Add [R06 02] SOB (shortness of breath)     1/9/2019  3:35 AM Jocelin Pat M Add [N39 0] UTI (urinary tract infection)     1/9/2019  3:35 AM Jocelin Pat M Add [A41 9] Sepsis ([facility] [address] )     1/9/2019  3:35 AM Jocelin Pat M Add [R50 9] Fever     1/9/2019  3:35 AM Jocelin Pat M Add [R74 8] Elevated troponin     1/9/2019  3:35 AM Leah Aleyda Add [R53 83] Fatigue     1/9/2019  9:00 AM Juan Hines Add [R74 8] Elevated troponin I level ED Disposition     ED Disposition Condition Comment    Admit  Case was discussed with Saturnino MATHUR and the patient's admission status was agreed to be Admission Status: inpatient status to the service of Dr Richelle Mckoy   Follow-up Information    None         There are no discharge medications for this patient  No discharge procedures on file      ED Provider  Electronically Signed by           Olu Kamara DO  01/10/19 1812

## 2019-01-09 NOTE — ASSESSMENT & PLAN NOTE
· Initial troponin 0 13  · No reported chest pain and represents likely noncardiac elevation of troponin

## 2019-01-09 NOTE — PROGRESS NOTES
Pt unable to void and has not voided since "last night " Bladder scan reveals 557 ml w/ distention  Pt unable to on command  Attending MD aware w/ orders to place fuller catheter  16 Fr  Was attempted and unable to pass, w/ the feeling of coiling  Pt  Denies prostate issues in past  MD aware  Pt transferred to ICU on cardiac monitoring

## 2019-01-09 NOTE — ASSESSMENT & PLAN NOTE
· Patient febrile, with tachycardia and source of urine  · Will continue IV ceftriaxone  · Elevated lactic acid and will gently hydrate and recheck

## 2019-01-09 NOTE — H&P
History and Physical - [facility] Internal Medicine    Patient Information: Jarrett Showers 72 y o  male MRN: [id number]  Unit/Bed#: ED 23 Encounter: [id number]  Admitting Physician: Kobi Ramirez MD  PCP: No primary care provider on file  Date of Admission:  01/09/19      * SIRS (systemic inflammatory response syndrome) (HCC)   Assessment & Plan    · Patient febrile, with tachycardia and source of urine  · Will continue IV ceftriaxone  · Elevated lactic acid and will gently hydrate and recheck     Acute cystitis without hematuria   Assessment & Plan    · Continue ceftriaxone     Shortness of breath   Assessment & Plan    · BNP 2,206 on admission  · CTA to rule PE of the chest does not identify signs of volume overload  · Will monitor as clinical picture improves     Elevated troponin I level   Assessment & Plan    · Initial troponin 0 13  · No reported chest pain and represents likely noncardiac elevation of troponin     Lactic acidosis   Assessment & Plan    · Initial was 4 7  · Will gently hydrate and recheck in 2 hr     Tobacco abuse   Assessment & Plan    · Patient reports smoking 1 pack of cigarettes per day  · Encourage total cessation and will provide nicotine patch during hospitalization           HPI:   Stef Martínez is a 72 y o  male comes to the emergency department from home via EMS reporting that he has shortness of breath and difficulty breathing  Has no chest pain  He denies swelling of the lower extremities  He denies abdominal pain  He does report to some urinary retention type symptoms and is not able to start voiding when he wants to at times  He generally feels weak  He is a nausea, vomiting, diarrhea, constipation  Denies: Chest pain, Shortness of Breath, Nausea, Vomiting, Diarrhea, Dysuria    ROS:  A 12-point review of systems was done  Please see the HPI for the full details  All other systems negative      PMH:  Principal Problem:    SIRS (systemic inflammatory response syndrome) [facility])  Active Problems:    Acute cystitis without hematuria    Tobacco abuse    Lactic acidosis    Elevated troponin I level    Shortness of breath      Past Medical History:   Diagnosis Date    Diabetes mellitus ([address] )     Hypertension      Past Surgical History:   Procedure Laterality Date    CORONARY ARTERY BYPASS GRAFT       Social History     Social History    Marital status: Unknown     Spouse name: N/A    Number of children: N/A    Years of education: N/A     Social History Main Topics    Smoking status: Current Every Day Smoker    Smokeless tobacco: None    Alcohol use None    Drug use: Unknown    Sexual activity: Not Asked     Other Topics Concern    None     Social History Narrative    None     History reviewed  No pertinent family history  MED/ALLERGIES:  No current facility-administered medications for this encounter  No current outpatient prescriptions on file  No Known Allergies    OBJECTIVE:    Current Vitals:   Blood Pressure: 126/55 (01/09/19 0401)  Pulse: (!) 119 (01/09/19 0401)  Temperature: (!) 101 1 °F (38 4 °C) (01/09/19 0401)  Temp Source: Oral (01/09/19 0401)  Respirations: (!) 32 (01/09/19 0401)  Weight - Scale: 65 5 kg (144 lb 6 4 oz) (01/09/19 0233)  SpO2: 99 % (01/09/19 0401)    No intake or output data in the 24 hours ending 01/09/19 0449    Invasive Devices     Peripheral Intravenous Line            Peripheral IV 01/09/19 Left Antecubital less than 1 day    Peripheral IV 01/09/19 Right Antecubital less than 1 day    Peripheral IV 01/09/19 Right Hand less than 1 day                  Physical Exam   Psychiatric: Judgment and thought content normal  His speech is not delayed  He is withdrawn   Cognition and memory are normal                                                      Lab Results:   Results from last 7 days  Lab Units 01/09/19  0241 01/09/19  0237   WBC Thousand/uL  --  2 62*   HEMOGLOBIN g/dL  --  14 3   I STAT HEMOGLOBIN g/dl 14 6  --    HEMATOCRIT %  -- 42 0   HEMATOCRIT, ISTAT % 43  --    PLATELETS Thousands/uL  --  168      Results from last 7 days  Lab Units 01/09/19  0241 01/09/19  0237   POTASSIUM mmol/L  --  3 5   CHLORIDE mmol/L  --  95*   CO2 mmol/L  --  25   CO2, I-STAT mmol/L 23  --    BUN mg/dL  --  14   CREATININE mg/dL  --  1 05   CALCIUM mg/dL  --  9 0   ALK PHOS U/L  --  139*   ALT U/L  --  32   AST U/L  --  26   GLUCOSE, ISTAT mg/dl 212*  --      Lab Results   Component Value Date    TROPONINI 0 13 (H) 01/09/2019         Imaging:     CTA - CHEST WITH IV CONTRAST - PULMONARY ANGIOGRAM:  1  Motion limited examination  No evidence of pulmonary embolism to the proximal segmental level  2   Emphysematous changes of lungs with linear atelectasis in the right lower lobe  No focal consolidation  3   A small calcified left hilar lymph node is seen which may be due to sequela of prior granulomatous disease  4   Large noncalcified plaques are seen within the proximal abdominal aorta with areas of ulceration  VTE Prophylaxis: Heparin    Code Status: FULL    Counseling / Coordination of Care: Total floor / unit time spent today 45 minutes  Anticipated Length of Stay will be: MORE THAN 2 (TWO) Midnights:     Gracy Franco PA-C    This note has been constructed using a voice recognition system

## 2019-01-09 NOTE — NURSING NOTE
Pt  Temp on arrival to the floor was 104 1  Tylenol given in ED on 0245, unable to give more tylenol at this time  Pt  Packed with ice at this time  Temp rechecked at 0609 @ 102 5  Pt  Offers no further complaints at this time  Will continue to monitor

## 2019-01-09 NOTE — PROGRESS NOTES
Contacted daughter who stated that the patient's PCP is Conor Woodward that he also follows up with Dr Yao Pritchett at the [address] [place] [address] group=--patient has a history of diabetes mellitus type 2 on Lantus, coronary artery disease status post CABG, hypertension, COPD, dyslipidemia, peripheral vascular disease,     His medications that she could remember included nitroglycerin  Hydrochlorothiazide 25 mg daily  Lantus 40 units in the afternoon  Lipitor 80 mg daily  Lisinopril 10 mg daily  Plavix 75 mg daily  Prilosec 20 mg twice a day  Metoprolol 75 mg twice a day  Imdur ER 30 mg daily  Aspirin 81 mg daily  Iron 325 mg twice a day  Pletal 100 mg bid    Will cover the patient with a sliding scale coverage for now

## 2019-01-09 NOTE — ASSESSMENT & PLAN NOTE
· Patient reports smoking 1 pack of cigarettes per day  · Encourage total cessation and will provide nicotine patch during hospitalization

## 2019-01-09 NOTE — ASSESSMENT & PLAN NOTE
· BNP 2,206 on admission  · CTA to rule PE of the chest does not identify signs of volume overload  · Will monitor as clinical picture improves

## 2019-01-09 NOTE — SEPSIS NOTE
Sepsis Note   Ed Swartz 72 y o  male MRN: [id number]  Unit/Bed#: ED 19 Encounter: [id number]            Initial Sepsis Screening     Row Name 01/09/19 0300                Is the patient's history suggestive of a new or worsening infection? (!)  Yes (Proceed)  -SM        Suspected source of infection pneumonia  -SM        Are two or more of the following signs & symptoms of infection both present and new to the patient? (!)  Yes (Proceed)  -SM        Indicate SIRS criteria Hyperthemia > 38 3C (100 9F); Altered mental status; Tachycardia > 90 bpm;Tachypnea > 20 resp per min  -SM        If the answer is yes to both questions, suspicion of sepsis is present          If severe sepsis is present AND tissue hypoperfusion perists in the hour after fluid resuscitation or lactate > 4, the patient meets criteria for SEPTIC SHOCK          Are any of the following organ dysfunction criteria present within 6 hours of suspected infection and SIRS criteria that are NOT considered to be chronic conditions? (!)  Yes  -SM        Organ dysfunction          Date of presentation of severe sepsis          Time of presentation of severe sepsis          Tissue hypoperfusion persists in the hour after crystalloid fluid administration, evidenced, by either:          Was hypotension present within one hour of the conclusion of crystalloid fluid administration?           Date of presentation of septic shock          Time of presentation of septic shock            User Key  (r) = Recorded By, (t) = Taken By, (c) = Cosigned By    Initials Name Provider Type    WILLEM Irene DO Physician

## 2019-01-09 NOTE — ED NOTES
Pt states he takes 5 medications at home but unable to name medications at this time   Pt states "I don't know what they're called "      Ara Montelongo, MAGGIE  01/09/19 0204

## 2019-01-09 NOTE — UTILIZATION REVIEW
Initial Clinical Review    Admission: Date/Time/Statement: 1/9/19 @ 0432   Orders Placed This Encounter   Procedures    Inpatient Admission (expected length of stay for this patient is greater than two midnights)     Standing Status:   Standing     Number of Occurrences:   1     Order Specific Question:   Admitting Physician     Answer:   Jayde Leon [[id number]]     Order Specific Question:   Level of Care     Answer:   Med Surg [[id number]]     Order Specific Question:   Estimated length of stay     Answer:   More than 2 Midnights     Order Specific Question:   Certification     Answer:   I certify that inpatient services are medically necessary for this patient for a duration of greater than two midnights  See H&P and MD Progress Notes for additional information about the patient's course of treatment  ED: Date/Time/Mode of Arrival:   ED Arrival Information     Expected Arrival Acuity Means of Arrival Escorted By Service Admission Type    - 1/9/2019 02:29 Emergent Ambulance [facility] [facility] Hospitalist Emergency    Arrival Complaint    medical problem        Chief Complaint:   Chief Complaint   Patient presents with    Shortness of Breath     Pt brought in via ems from home with sob that started tonight  Pt has cough, nasal congestion, fever at home  History of Illness: Gino Todd is a 72 y o  male comes to the emergency department from home via EMS reporting that he has shortness of breath and difficulty breathing  Has no chest pain  He denies swelling of the lower extremities  He denies abdominal pain  He does report to some urinary retention type symptoms and is not able to start voiding when he wants to at times  He generally feels weak    He is a nausea, vomiting, diarrhea, constipation      Denies: Chest pain, Shortness of Breath, Nausea, Vomiting, Diarrhea, Dysuria       ED Vital Signs:   ED Triage Vitals   Temperature Pulse Respirations Blood Pressure SpO2   01/09/19 0233 01/09/19 0233 01/09/19 0233 01/09/19 0233 01/09/19 0233   (!) 102 8 °F (39 3 °C) 105 (!) 32 147/65 92 %      Temp Source Heart Rate Source Patient Position - Orthostatic VS BP Location FiO2 (%)   01/09/19 0233 01/09/19 0233 01/09/19 0455 01/09/19 0233 --   Oral Monitor Lying Right arm       Pain Score       01/09/19 0455       No Pain        Wt Readings from Last 1 Encounters:   01/09/19 70 7 kg (155 lb 13 8 oz)     Vital Signs (abnormal): above    Pertinent Labs/Diagnostic Test Results:   Lactic  Acid   4 7  U/A   Mod  Blood  1+ protein    >  1000   Glucose    sm leukocyte  BNP   2,206  Troponin  0 13    Chloride   95  AG  14  WBC   2 62  Ct  Chest:     Motion limited examination   No evidence of pulmonary embolism to the proximal segmental level  2   Emphysematous changes of lungs with linear atelectasis in the right lower lobe   No focal consolidation  3   A small calcified left hilar lymph node is seen which may be due to sequela of prior granulomatous disease  4   Large noncalcified plaques are seen within the proximal abdominal aorta with areas of ulceration      ED Treatment:   Medication Administration from 01/09/2019 0229 to 01/09/2019 0530       Date/Time Order Dose Route Action Action by Comments     01/09/2019 0347 sodium chloride 0 9 % bolus 1,000 mL 0 mL Intravenous Stopped Carlos Langston RN      01/09/2019 0240 sodium chloride 0 9 % bolus 1,000 mL 1,000 mL Intravenous 5655 Lety Chaney RN      01/09/2019 0245 acetaminophen (TYLENOL) rectal suppository 650 mg 650 mg Rectal Given Carlos Langston RN      01/09/2019 0313 iohexol (OMNIPAQUE) 350 MG/ML injection (SINGLE-DOSE) 85 mL 85 mL Intravenous Given Angeli Brock      01/09/2019 0401 ceftriaxone (ROCEPHIN) 1 g/50 mL in dextrose IVPB 0 mg Intravenous Stopped Carlos Langston RN      01/09/2019 0331 ceftriaxone (ROCEPHIN) 1 g/50 mL in dextrose IVPB 1,000 mg Intravenous 5655 Lety Chaney RN      01/09/2019 0428 sodium chloride 0 9 % bolus 1,000 mL 0 mL Intravenous Stopped Joceline Snow RN      01/09/2019 7368 sodium chloride 0 9 % bolus 1,000 mL 1,000 mL Intravenous 5655 Lety Christine RN      01/09/2019 1667 ibuprofen (MOTRIN) tablet 600 mg 600 mg Oral Given Jocelineangel Snow RN         Past Medical/Surgical History:    Active Ambulatory Problems     Diagnosis Date Noted    No Active Ambulatory Problems     Resolved Ambulatory Problems     Diagnosis Date Noted    No Resolved Ambulatory Problems     Past Medical History:   Diagnosis Date    Diabetes mellitus ([facility] )     Hypertension      Admitting Diagnosis: UTI (urinary tract infection) [N39 0]  Fatigue [R53 83]  SOB (shortness of breath) [R06 02]  Elevated troponin [R74 8]  Fever [R50 9]  Sepsis ([facility] ) [A41 9]  Age/Sex: 72 y o  male     Assessment/Plan:   SIRS (systemic inflammatory response syndrome) (HCC)   Assessment & Plan     · Patient febrile, with tachycardia and source of urine  · Will continue IV ceftriaxone  · Elevated lactic acid and will gently hydrate and recheck      Acute cystitis without hematuria   Assessment & Plan     · Continue ceftriaxone      Shortness of breath   Assessment & Plan     · BNP 2,206 on admission  · CTA to rule PE of the chest does not identify signs of volume overload  · Will monitor as clinical picture improves     Elevated troponin I level   Assessment & Plan     · Initial troponin 0 13  · No reported chest pain and represents likely noncardiac elevation of troponin      Lactic acidosis   Assessment & Plan     · Initial was 4 7  · Will gently hydrate and recheck in 2 hr      Tobacco abuse   Assessment & Plan     · Patient reports smoking 1 pack of cigarettes per day  · Encourage total cessation and will provide nicotine patch during hospitalization     Anticipated Length of Stay will be: MORE THAN 2 (TWO) Midnights:          Admission Orders:   IP  1/9  @    0432  Scheduled Meds:   Current Facility-Administered Medications:  acetaminophen 650 mg Oral Q6H PRN Polo Gave DERIC Mckinney    cefepime 1,000 mg Intravenous Q12H Eufemia Mccauley MD Last Rate: Stopped (01/09/19 1119)   heparin (porcine) 5,000 Units Subcutaneous [facility] Andrei Bliss PA-C    insulin lispro 1-5 Units Subcutaneous TID AC Kay Hines MD    insulin lispro 1-5 Units Subcutaneous HS Kay Hines MD    lactated ringers 125 mL/hr Intravenous Continuous Eufemia Mccauley MD Last Rate: 125 mL/hr (01/09/19 1444)   nicotine 1 patch Transdermal Daily Andrei Bliss PA-C      Continuous Infusions:   lactated ringers 125 mL/hr Last Rate: 125 mL/hr (01/09/19 1444)     PRN Meds:   acetaminophen     Tele  Cardiac  Diet  Serial troponin  Cons  Cardiology    Per  Cardiology  Consult:    1  SIRS-Probable sepsis:  Ongoing workup with undefined source thus far  2  Elevated troponin/non STEMI - probably type 2 secondary to demand ischemia in the setting of sepsis and underlying CAD  3  CAD, remote CABG x2 (1990s):  Details of CABG unclear  Current CT scan suggest significant coronary calcifications  4  Right bundle branch block  5  Possible cardiomyopathy:  Patient offers suggestion of LV dysfunction though details unknown and no records currently available     · Will request records of [facility] for insight into the patient's cardiac history  · Patient reports having had an echocardiogram in the last few months at the office of his usual cardiologist - as such we will defer immediately repeating one unless the patient should have some other sign of ACS or hemodynamic instability  · In the absence of chest pain and ECG changes would not heparinize at this time  Continue aspirin  No beta-blocker in the setting of hypotension  · Check ECG now and p r n  for any complaint of chest discomfort  · Pt to be transferred to higher level of care (stepdown ICU) in light of hypotension, lactic acidosis  [address] Utilization Review Department  Phone: [phone];  Fax [phone]  [email or web address]  [email or web address]  ATTENTION: Please call with any questions or concerns to [phone]  and carefully listen to the prompts so that you are directed to the right person  Send all requests for admission clinical reviews, approved or denied determinations and any other requests to fax [phone]   All voicemails are confidential

## 2019-01-10 ENCOUNTER — APPOINTMENT (INPATIENT)
Dept: NON INVASIVE DIAGNOSTICS | Facility: HOSPITAL | Age: 74
DRG: 871 | End: 2019-01-10
Payer: COMMERCIAL

## 2019-01-10 LAB
ANION GAP SERPL CALCULATED.3IONS-SCNC: 7 MMOL/L (ref 4–13)
BASOPHILS # BLD MANUAL: 0 THOUSAND/UL (ref 0–0.1)
BASOPHILS NFR MAR MANUAL: 0 % (ref 0–1)
BUN SERPL-MCNC: 14 MG/DL (ref 5–25)
BURR CELLS BLD QL SMEAR: PRESENT
CALCIUM SERPL-MCNC: 8.1 MG/DL (ref 8.3–10.1)
CHLORIDE SERPL-SCNC: 105 MMOL/L (ref 100–108)
CO2 SERPL-SCNC: 27 MMOL/L (ref 21–32)
CREAT SERPL-MCNC: 0.96 MG/DL (ref 0.6–1.3)
EOSINOPHIL # BLD MANUAL: 0 THOUSAND/UL (ref 0–0.4)
EOSINOPHIL NFR BLD MANUAL: 0 % (ref 0–6)
ERYTHROCYTE [DISTWIDTH] IN BLOOD BY AUTOMATED COUNT: 13.2 % (ref 11.6–15.1)
GFR SERPL CREATININE-BSD FRML MDRD: 79 ML/MIN/1.73SQ M
GLUCOSE SERPL-MCNC: 105 MG/DL (ref 65–140)
GLUCOSE SERPL-MCNC: 105 MG/DL (ref 65–140)
GLUCOSE SERPL-MCNC: 145 MG/DL (ref 65–140)
GLUCOSE SERPL-MCNC: 179 MG/DL (ref 65–140)
GLUCOSE SERPL-MCNC: 250 MG/DL (ref 65–140)
HCT VFR BLD AUTO: 35.2 % (ref 36.5–49.3)
HGB BLD-MCNC: 12.1 G/DL (ref 12–17)
LYMPHOCYTES # BLD AUTO: 0.46 THOUSAND/UL (ref 0.6–4.47)
LYMPHOCYTES # BLD AUTO: 2 % (ref 14–44)
MCH RBC QN AUTO: 32.3 PG (ref 26.8–34.3)
MCHC RBC AUTO-ENTMCNC: 34.4 G/DL (ref 31.4–37.4)
MCV RBC AUTO: 94 FL (ref 82–98)
MONOCYTES # BLD AUTO: 1.84 THOUSAND/UL (ref 0–1.22)
MONOCYTES NFR BLD: 8 % (ref 4–12)
NEUTROPHILS # BLD MANUAL: 20.74 THOUSAND/UL (ref 1.85–7.62)
NEUTS BAND NFR BLD MANUAL: 17 % (ref 0–8)
NEUTS SEG NFR BLD AUTO: 73 % (ref 43–75)
NRBC BLD AUTO-RTO: 0 /100 WBCS
PLATELET # BLD AUTO: 120 THOUSANDS/UL (ref 149–390)
PLATELET BLD QL SMEAR: ABNORMAL
PMV BLD AUTO: 9.9 FL (ref 8.9–12.7)
POTASSIUM SERPL-SCNC: 4.9 MMOL/L (ref 3.5–5.3)
PROCALCITONIN SERPL-MCNC: 60.61 NG/ML
RBC # BLD AUTO: 3.75 MILLION/UL (ref 3.88–5.62)
SODIUM SERPL-SCNC: 139 MMOL/L (ref 136–145)
TOTAL CELLS COUNTED SPEC: 100
WBC # BLD AUTO: 23.04 THOUSAND/UL (ref 4.31–10.16)

## 2019-01-10 PROCEDURE — 99232 SBSQ HOSP IP/OBS MODERATE 35: CPT | Performed by: HOSPITALIST

## 2019-01-10 PROCEDURE — 85007 BL SMEAR W/DIFF WBC COUNT: CPT | Performed by: HOSPITALIST

## 2019-01-10 PROCEDURE — 85027 COMPLETE CBC AUTOMATED: CPT | Performed by: HOSPITALIST

## 2019-01-10 PROCEDURE — 84145 PROCALCITONIN (PCT): CPT | Performed by: HOSPITALIST

## 2019-01-10 PROCEDURE — 93306 TTE W/DOPPLER COMPLETE: CPT

## 2019-01-10 PROCEDURE — 93321 DOPPLER ECHO F-UP/LMTD STD: CPT | Performed by: INTERNAL MEDICINE

## 2019-01-10 PROCEDURE — 82948 REAGENT STRIP/BLOOD GLUCOSE: CPT

## 2019-01-10 PROCEDURE — 99223 1ST HOSP IP/OBS HIGH 75: CPT | Performed by: INTERNAL MEDICINE

## 2019-01-10 PROCEDURE — 99232 SBSQ HOSP IP/OBS MODERATE 35: CPT | Performed by: INTERNAL MEDICINE

## 2019-01-10 PROCEDURE — 93308 TTE F-UP OR LMTD: CPT | Performed by: INTERNAL MEDICINE

## 2019-01-10 PROCEDURE — 93325 DOPPLER ECHO COLOR FLOW MAPG: CPT | Performed by: INTERNAL MEDICINE

## 2019-01-10 PROCEDURE — 80048 BASIC METABOLIC PNL TOTAL CA: CPT | Performed by: HOSPITALIST

## 2019-01-10 PROCEDURE — 0T9B70Z DRAINAGE OF BLADDER WITH DRAINAGE DEVICE, VIA NATURAL OR ARTIFICIAL OPENING: ICD-10-PCS | Performed by: EMERGENCY MEDICINE

## 2019-01-10 RX ORDER — ISOSORBIDE MONONITRATE 30 MG/1
30 TABLET, EXTENDED RELEASE ORAL DAILY
COMMUNITY
End: 2019-01-29 | Stop reason: HOSPADM

## 2019-01-10 RX ORDER — ASPIRIN 81 MG/1
81 TABLET, CHEWABLE ORAL DAILY
COMMUNITY
End: 2019-03-22 | Stop reason: SDUPTHER

## 2019-01-10 RX ORDER — FERROUS SULFATE 325(65) MG
325 TABLET ORAL 2 TIMES DAILY WITH MEALS
COMMUNITY
End: 2019-01-29 | Stop reason: HOSPADM

## 2019-01-10 RX ORDER — LISINOPRIL 10 MG/1
10 TABLET ORAL DAILY
COMMUNITY
End: 2019-01-29 | Stop reason: HOSPADM

## 2019-01-10 RX ORDER — CILOSTAZOL 50 MG/1
100 TABLET ORAL
Status: DISCONTINUED | OUTPATIENT
Start: 2019-01-10 | End: 2019-01-10

## 2019-01-10 RX ORDER — ATORVASTATIN CALCIUM 80 MG/1
80 TABLET, FILM COATED ORAL DAILY
COMMUNITY
End: 2019-01-29 | Stop reason: HOSPADM

## 2019-01-10 RX ORDER — METOPROLOL TARTRATE 50 MG/1
75 TABLET, FILM COATED ORAL EVERY 12 HOURS SCHEDULED
COMMUNITY
End: 2019-01-29 | Stop reason: HOSPADM

## 2019-01-10 RX ORDER — ISOSORBIDE MONONITRATE 30 MG/1
30 TABLET, EXTENDED RELEASE ORAL DAILY
Status: DISCONTINUED | OUTPATIENT
Start: 2019-01-11 | End: 2019-01-14

## 2019-01-10 RX ORDER — CLOPIDOGREL BISULFATE 75 MG/1
75 TABLET ORAL DAILY
COMMUNITY
End: 2019-01-29 | Stop reason: HOSPADM

## 2019-01-10 RX ORDER — CLOPIDOGREL BISULFATE 75 MG/1
75 TABLET ORAL DAILY
Status: DISCONTINUED | OUTPATIENT
Start: 2019-01-11 | End: 2019-01-12

## 2019-01-10 RX ORDER — HYDROCHLOROTHIAZIDE 25 MG/1
25 TABLET ORAL DAILY
COMMUNITY
End: 2019-01-29 | Stop reason: HOSPADM

## 2019-01-10 RX ORDER — ATORVASTATIN CALCIUM 40 MG/1
40 TABLET, FILM COATED ORAL
Status: DISCONTINUED | OUTPATIENT
Start: 2019-01-10 | End: 2019-01-14

## 2019-01-10 RX ORDER — OMEPRAZOLE 20 MG/1
20 CAPSULE, DELAYED RELEASE ORAL 2 TIMES DAILY
Status: ON HOLD | COMMUNITY
End: 2019-01-29

## 2019-01-10 RX ORDER — ASPIRIN 81 MG/1
81 TABLET ORAL DAILY
Status: DISCONTINUED | OUTPATIENT
Start: 2019-01-10 | End: 2019-01-22 | Stop reason: HOSPADM

## 2019-01-10 RX ORDER — INSULIN GLARGINE 100 [IU]/ML
40 INJECTION, SOLUTION SUBCUTANEOUS DAILY
Status: ON HOLD | COMMUNITY
End: 2019-01-29

## 2019-01-10 RX ORDER — CILOSTAZOL 100 MG/1
100 TABLET ORAL 2 TIMES DAILY
COMMUNITY
End: 2019-01-29 | Stop reason: HOSPADM

## 2019-01-10 RX ORDER — INSULIN GLARGINE 100 [IU]/ML
10 INJECTION, SOLUTION SUBCUTANEOUS
Status: DISCONTINUED | OUTPATIENT
Start: 2019-01-10 | End: 2019-01-21

## 2019-01-10 RX ADMIN — METOPROLOL TARTRATE 25 MG: 25 TABLET, FILM COATED ORAL at 08:55

## 2019-01-10 RX ADMIN — CEFTRIAXONE 1000 MG: 1 INJECTION, POWDER, FOR SOLUTION INTRAMUSCULAR; INTRAVENOUS at 21:37

## 2019-01-10 RX ADMIN — INSULIN GLARGINE 10 UNITS: 100 INJECTION, SOLUTION SUBCUTANEOUS at 21:37

## 2019-01-10 RX ADMIN — INSULIN LISPRO 2 UNITS: 100 INJECTION, SOLUTION INTRAVENOUS; SUBCUTANEOUS at 16:27

## 2019-01-10 RX ADMIN — ASPIRIN 81 MG: 81 TABLET, COATED ORAL at 08:55

## 2019-01-10 RX ADMIN — SODIUM CHLORIDE, SODIUM LACTATE, POTASSIUM CHLORIDE, AND CALCIUM CHLORIDE 125 ML/HR: .6; .31; .03; .02 INJECTION, SOLUTION INTRAVENOUS at 08:17

## 2019-01-10 RX ADMIN — METOPROLOL TARTRATE 25 MG: 25 TABLET, FILM COATED ORAL at 21:38

## 2019-01-10 RX ADMIN — ATORVASTATIN CALCIUM 40 MG: 40 TABLET, FILM COATED ORAL at 16:27

## 2019-01-10 RX ADMIN — SODIUM CHLORIDE, SODIUM LACTATE, POTASSIUM CHLORIDE, AND CALCIUM CHLORIDE 125 ML/HR: .6; .31; .03; .02 INJECTION, SOLUTION INTRAVENOUS at 00:29

## 2019-01-10 RX ADMIN — HEPARIN SODIUM 5000 UNITS: 5000 INJECTION INTRAVENOUS; SUBCUTANEOUS at 13:03

## 2019-01-10 RX ADMIN — ACETAMINOPHEN 650 MG: 325 TABLET ORAL at 06:30

## 2019-01-10 RX ADMIN — NICOTINE 1 PATCH: 21 PATCH, EXTENDED RELEASE TRANSDERMAL at 08:17

## 2019-01-10 RX ADMIN — HEPARIN SODIUM 5000 UNITS: 5000 INJECTION INTRAVENOUS; SUBCUTANEOUS at 21:37

## 2019-01-10 RX ADMIN — INSULIN LISPRO 1 UNITS: 100 INJECTION, SOLUTION INTRAVENOUS; SUBCUTANEOUS at 21:38

## 2019-01-10 RX ADMIN — HEPARIN SODIUM 5000 UNITS: 5000 INJECTION INTRAVENOUS; SUBCUTANEOUS at 05:03

## 2019-01-10 RX ADMIN — CEFEPIME HYDROCHLORIDE 1000 MG: 1 INJECTION, POWDER, FOR SOLUTION INTRAMUSCULAR; INTRAVENOUS at 10:46

## 2019-01-10 RX ADMIN — METOPROLOL TARTRATE 25 MG: 25 TABLET, FILM COATED ORAL at 15:17

## 2019-01-10 NOTE — CONSULTS
Consultation - Infectious Disease   Tasneem Huerta 72 y o  male MRN: [id number]  Unit/Bed#: ICU 10 Encounter: [id number]      IMPRESSION & RECOMMENDATIONS:   Impression/Recommendations:  1  Severe sepsis  POA:  Fever, leukopenia, and lactic acidosis  Due to # 2/3  CXR, CT A/P negative  Hemodynamically stable and nontoxic  Clinically improving  Rec:  · Continue antibiotics as below  · Follow temperatures closely  · Check CBC and procalcitonin in a m  · Supportive care as per the primary service    2   E coli bacteremia  Due to # 3  CT A/P otherwise negative  Rec:  · Tailor antibiotics to ceftriaxone    3  Gram-negative UTI  With pyuria and urinary retention  Consider E coli given # 2  Low risk for MDRO given lack of recent hospitalizations  Consider epididymitis given complaints of scrotal pain  Rec:  · Continue antibiotics as above  · Follow-up final urine cultures and tailor antibiotics as indicated  · Serial  exams  · If continues to have scrotal pain and erythema consider ultrasound    4  Urinary retention  Consider BPH  Status post Marshall  Rec:  · Continue Marshall catheter per primary service  · Follow urine output closely  · Consider urology consultation    5  DM with hyperglycemia  On insulin  Probable risk factor for above infection  Rec:  · Continue management as per the primary service    Antibiotics:  Cefepime # 2    Thank you for this consultation  We will follow along with you  HISTORY OF PRESENT ILLNESS:  Reason for Consult:  Sepsis    HPI: Tasneem Huerta is a 72 y o  male with a history of diabetes who was brought to the emergency department yesterday for shortness of breath at home  Upon presentation he was noted to be febrile with tachycardia and tachypnea  Upon examination he did have some perirectal pain  His labs revealed leukopenia, hyperglycemia, and lactic acidosis  His UA showed pyuria and his procalcitonin was markedly elevated    The patient reported that he was unable to void and a bladder scan revealed 557 cc of urine with distension  Marshall catheter was placed  He underwent a CT C/A/P which showed no pneumonia or acute intra-abdominal abnormality  He was given a dose of ceftriaxone and started on cefepime  Since admission his blood cultures have come back growing E coli and a urine culture is growing GNRs  We are asked to comment on further evaluation and management  REVIEW OF SYSTEMS:  Patient complains of scrotal pain and redness  A complete system-based review of systems is otherwise negative  PAST MEDICAL HISTORY:  Past Medical History:   Diagnosis Date    Diabetes mellitus ([facility] )     Hypertension      Past Surgical History:   Procedure Laterality Date    CORONARY ARTERY BYPASS GRAFT         FAMILY HISTORY:  Non-contributory    SOCIAL HISTORY:  History   Alcohol use Not on file     History   Drug use: Unknown     History   Smoking Status    Current Every Day Smoker   Smokeless Tobacco    Not on file       ALLERGIES:  No Known Allergies    MEDICATIONS:  All current active medications have been reviewed      PHYSICAL EXAM:  Vitals:  Temp:  [98 7 °F (37 1 °C)-99 5 °F (37 5 °C)] 99 4 °F (37 4 °C)  HR:  [] 96  Resp:  [17-28] 19  BP: (104-141)/(51-76) 136/65  SpO2:  [91 %-95 %] 93 %  Temp (24hrs), Av 2 °F (37 3 °C), Min:98 7 °F (37 1 °C), Max:99 5 °F (37 5 °C)  Current: Temperature: 99 4 °F (37 4 °C)     Physical Exam:  General:  Well-nourished, well-developed, in no acute distress  Eyes:  Conjunctive clear with no hemorrhages or effusions  Oropharynx:  No ulcers, no lesions  Neck:  Supple, no lymphadenopathy  Lungs:  Clear to auscultation bilaterally, no accessory muscle use  Cardiac:  Regular rate and rhythm, no murmurs  Abdomen:  Soft, mild suprapubic pain to deep palpation, non-distended  Extremities:  No peripheral cyanosis, clubbing, or edema  Skin:  No rashes, no ulcers  Neurological:  Moves all four extremities spontaneously, sensation grossly intact  :  Diffuse erythema and mild tenderness of scrotum  No palpable masses or induration  Marshall with sagar urine  LABS, IMAGING, & OTHER STUDIES:  Lab Results:  I have personally reviewed pertinent labs  Results from last 7 days  Lab Units 01/10/19  0433 01/09/19  0635 01/09/19  0241 01/09/19  0237   POTASSIUM mmol/L 4 9 3 1*  --  3 5   CHLORIDE mmol/L 105 101  --  95*   CO2 mmol/L 27 16*  --  25   CO2, I-STAT mmol/L  --   --  23  --    BUN mg/dL 14 16  --  14   CREATININE mg/dL 0 96 1 29  --  1 05   EGFR ml/min/1 73sq m 79 55 90 71   GLUCOSE, ISTAT mg/dl  --   --  212*  --    CALCIUM mg/dL 8 1* 8 6  --  9 0   AST U/L  --   --   --  26   ALT U/L  --   --   --  32   ALK PHOS U/L  --   --   --  139*       Results from last 7 days  Lab Units 01/10/19  0433 01/09/19  0635 01/09/19  0241 01/09/19  0237   WBC Thousand/uL 23 04* 7 46  --  2 62*   HEMOGLOBIN g/dL 12 1 12 6  --  14 3   I STAT HEMOGLOBIN g/dl  --   --  14 6  --    PLATELETS Thousands/uL 120* 144*  --  168       Results from last 7 days  Lab Units 01/09/19  0304 01/09/19  0239 01/09/19  0237   BLOOD CULTURE   --  Gram Negative Ishmael Enteric Like* Escherichia coli*   GRAM STAIN RESULT   --  Gram negative rods Gram negative rods   URINE CULTURE  >100,000 cfu/ml Gram Negative Ishmael Enteric Like*  --   --    INFLUENZA B PCR   --   --  None Detected   RSV PCR   --   --  None Detected       Imaging Studies:   I have personally reviewed pertinent imaging study reports and images in PACS  CT A/P no acute intraabdominal abnormality  CXR no pneumonia    EKG, Pathology, and Other Studies:   I have personally reviewed pertinent reports

## 2019-01-10 NOTE — PROGRESS NOTES
Progress Note - Sonya Quintana 72 y o  male MRN: [id number]    Unit/Bed#: ICU 10 Encounter: [id number]  Subjective:   Patient with some chest pain and SOB  Lepevaleriano Agustin No palps or LH  Roberth Agustin No edema    Objective:   Vitals: Blood pressure 134/65, pulse 98, temperature 99 5 °F (37 5 °C), temperature source Temporal, resp  rate (!) 25, height 5' 8" (1 727 m), weight 71 7 kg (158 lb 1 1 oz), SpO2 94 %  ,Body mass index is 24 03 kg/m²  CBC with diff:   Results from last 7 days  Lab Units 01/10/19  0433   WBC Thousand/uL 23 04*   RBC Million/uL 3 75*   HEMOGLOBIN g/dL 12 1   HEMATOCRIT % 35 2*   MCV fL 94   MCH pg 32 3   MCHC g/dL 34 4   RDW % 13 2   MPV fL 9 9   PLATELETS Thousands/uL 120*     CMP:   Results from last 7 days  Lab Units 01/10/19  0433  01/09/19  0241 01/09/19  0237   SODIUM mmol/L 139  < >  --  134*   POTASSIUM mmol/L 4 9  < >  --  3 5   CHLORIDE mmol/L 105  < >  --  95*   CO2 mmol/L 27  < >  --  25   CO2, I-STAT mmol/L  --   --  23  --    BUN mg/dL 14  < >  --  14   CREATININE mg/dL 0 96  < >  --  1 05   GLUCOSE, ISTAT mg/dl  --   --  212*  --    CALCIUM mg/dL 8 1*  < >  --  9 0   AST U/L  --   --   --  26   ALT U/L  --   --   --  32   ALK PHOS U/L  --   --   --  139*   EGFR ml/min/1 73sq m 79  < > 90 71   < > = values in this interval not displayed  Physical exam:  Lungs-bilateral rales and rhonchi    Decreased breath sounds  Heart-Regular with grade 1-2 systolic murmur at base  Abd-NT w/o mass or OM  Ext-no edema  Pulses absent    Assessment:  1  Acute NTMI type II due to stress of serious infection with severe underlying CAD      2  CAD-having likely angina-off meds    Apparently records received but not available at this time  Roberth Agustin He does believe he is on beta blocker  3  Sepsis  4  DM  5  HLP  6   HTN hx    Plan:  Start metoprolol 25 mg q6 h  Start ASA   Will order limited echo to look at LV cari after troponin spill  Start statin  Decrease IV to 50 cc/hr since may be getting fluid overloaded      Illona Ele, MD

## 2019-01-10 NOTE — PROGRESS NOTES
Progress Note - Wilman Johnson 72 y o  male MRN: [id number]    Unit/Bed#: ICU 10 Encounter: [id number]    Principal Problem:    SIRS (systemic inflammatory response syndrome) (HCC)  Active Problems:    Acute cystitis without hematuria    Tobacco abuse    Lactic acidosis    Elevated troponin I level    Shortness of breath  Resolved Problems:    * No resolved hospital problems  *      Assessment/Plan:    1-severe sepsis on presentation-secondary to 2-patient presented with fever, leukopenia, lactic acidosis with blood cultures growing E coli  Likely urinary source  On IV cefepime which will be continued  Procalcitonin elevated  Will repeat procalcitonin tomorrow  Will continue supportive care and monitoring in step-down    2-E coli bacteremia secondary to 3-genitourinary source  Will follow up on final cultures and tailor antibiotics as indicated  Patient also complaining of scrotal pain hence will get an ultrasound to rule out epididymitis  Id input appreciated  Continue cefepime day 2     3-urinary retention on admission status post Marshall placement  CT revealing prostatomegaly  Will consider Urology consult after his acute event has settled    4-diabetes mellitus type 2 on insulin  Patient on Lantus 40 units in the afternoon--this has been changed to a sliding scale coverage  Will add Lantus 10 units at night  Will check glycosylated hemoglobin    5-elevated troponin-likely non ST elevation type 2 secondary to sepsis  Continue on metoprolol and aspirin  No chest pain currently  Patient was also on aim Dur at home  6-coronary artery disease status post stent and CABG  Was on aspirin and Plavix at home  7-essential hypertension  Was on lisinopril 10 mg daily at home  Blood pressure currently low normal hence will hold off of lisinopril      Plan  Ultrasound of the scrotum to rule out epididymitis  Continue IV antibiotics  Will transfer patient out of step-down in 24 hr if he remains stable      Subjective:  Patient states he feels better  Blood cultures growing E coli  Likely urinary source  Patient complaining of scrotal pain  Will need to rule out epididymitis  CT of the abdomen and pelvis is negative for any intra-abdominal or pelvic abscess  Physical Exam:   Vitals: Blood pressure 136/65, pulse 96, temperature 99 4 °F (37 4 °C), temperature source Temporal, resp  rate 19, height 5' 8" (1 727 m), weight 71 7 kg (158 lb 1 1 oz), SpO2 93 %  ,Body mass index is 24 03 kg/m²  Gen:  Pleasant, non-tachypnic, non-dyspnic  Conversant  Heart: regular rate and rhythm, S1S2 present, no murmur, rub or gallop  Lungs: clear to ausculatation bilaterally  No wheezing, crackless, or rhonchi  No accessory muscle use or respiratory distress  Abd: soft, non-tender, non-distended  NABS, no guarding, rebound or peritoneal signs  Extremities: no clubbing, cyanosis or edema  2+pedal pulses bilaterally  Full range of motion  Neuro: awake, alert and oriented  Cranial nerves 2-12 intact  Strength and sensation grossly intact  Skin: warm and dry: no petechiae, purpura and rash      LABS:     Results from last 7 days  Lab Units 01/10/19  0433 01/09/19  0635 01/09/19  0241 01/09/19  0237   WBC Thousand/uL 23 04* 7 46  --  2 62*   HEMOGLOBIN g/dL 12 1 12 6  --  14 3   I STAT HEMOGLOBIN g/dl  --   --  14 6  --    HEMATOCRIT % 35 2* 37 6  --  42 0   HEMATOCRIT, ISTAT %  --   --  43  --    PLATELETS Thousands/uL 120* 144*  --  168       Results from last 7 days  Lab Units 01/10/19  0433 01/09/19  0635 01/09/19  0241 01/09/19  0237   POTASSIUM mmol/L 4 9 3 1*  --  3 5   CHLORIDE mmol/L 105 101  --  95*   CO2 mmol/L 27 16*  --  25   CO2, I-STAT mmol/L  --   --  23  --    BUN mg/dL 14 16  --  14   CREATININE mg/dL 0 96 1 29  --  1 05   GLUCOSE, ISTAT mg/dl  --   --  212*  --    CALCIUM mg/dL 8 1* 8 6  --  9 0       Intake/Output Summary (Last 24 hours) at 01/10/19 8168  Last data filed at 01/10/19 5398 Gross per 24 hour   Intake           2757 5 ml   Output             4040 ml   Net          -1282 5 ml           Current Facility-Administered Medications:  acetaminophen 650 mg Oral Q6H PRN Bria Peña PA-C    aspirin 81 mg Oral Daily Seamus Petty MD    atorvastatin 40 mg Oral Daily With Charlene Mckinnon MD    cefTRIAXone 1,000 mg Intravenous Q24H Peri Morton MD    heparin (porcine) 5,000 Units Subcutaneous [facility] Bria Peña PA-C    insulin lispro 1-5 Units Subcutaneous TID AC Kay Hines MD    insulin lispro 1-5 Units Subcutaneous HS Goldie Jolly MD    lactated ringers 50 mL/hr Intravenous Continuous Seamus Petty MD Last Rate: 50 mL/hr (01/10/19 0854)   metoprolol tartrate 25 mg Oral Q6H Seamus Petty MD    nicotine 1 patch Transdermal Daily Bria Peña PA-C        Family update- left update on daughter's answering service

## 2019-01-11 ENCOUNTER — APPOINTMENT (INPATIENT)
Dept: ULTRASOUND IMAGING | Facility: HOSPITAL | Age: 74
DRG: 871 | End: 2019-01-11
Payer: COMMERCIAL

## 2019-01-11 ENCOUNTER — APPOINTMENT (INPATIENT)
Dept: RADIOLOGY | Facility: HOSPITAL | Age: 74
DRG: 871 | End: 2019-01-11
Payer: COMMERCIAL

## 2019-01-11 LAB
ANION GAP SERPL CALCULATED.3IONS-SCNC: 9 MMOL/L (ref 4–13)
ATRIAL RATE: 105 BPM
BACTERIA UR CULT: ABNORMAL
BACTERIA UR CULT: ABNORMAL
BUN SERPL-MCNC: 14 MG/DL (ref 5–25)
CALCIUM SERPL-MCNC: 8.6 MG/DL (ref 8.3–10.1)
CHLORIDE SERPL-SCNC: 101 MMOL/L (ref 100–108)
CO2 SERPL-SCNC: 24 MMOL/L (ref 21–32)
CREAT SERPL-MCNC: 0.81 MG/DL (ref 0.6–1.3)
ERYTHROCYTE [DISTWIDTH] IN BLOOD BY AUTOMATED COUNT: 13.3 % (ref 11.6–15.1)
GFR SERPL CREATININE-BSD FRML MDRD: 89 ML/MIN/1.73SQ M
GLUCOSE SERPL-MCNC: 145 MG/DL (ref 65–140)
GLUCOSE SERPL-MCNC: 162 MG/DL (ref 65–140)
GLUCOSE SERPL-MCNC: 199 MG/DL (ref 65–140)
GLUCOSE SERPL-MCNC: 210 MG/DL (ref 65–140)
GLUCOSE SERPL-MCNC: 244 MG/DL (ref 65–140)
HCT VFR BLD AUTO: 35.3 % (ref 36.5–49.3)
HGB BLD-MCNC: 12.3 G/DL (ref 12–17)
MCH RBC QN AUTO: 32.5 PG (ref 26.8–34.3)
MCHC RBC AUTO-ENTMCNC: 34.8 G/DL (ref 31.4–37.4)
MCV RBC AUTO: 93 FL (ref 82–98)
P AXIS: 81 DEGREES
PLATELET # BLD AUTO: 117 THOUSANDS/UL (ref 149–390)
PMV BLD AUTO: 10.2 FL (ref 8.9–12.7)
POTASSIUM SERPL-SCNC: 4.2 MMOL/L (ref 3.5–5.3)
PR INTERVAL: 130 MS
PROCALCITONIN SERPL-MCNC: 25.82 NG/ML
QRS AXIS: 266 DEGREES
QRSD INTERVAL: 142 MS
QT INTERVAL: 376 MS
QTC INTERVAL: 496 MS
RBC # BLD AUTO: 3.78 MILLION/UL (ref 3.88–5.62)
SODIUM SERPL-SCNC: 134 MMOL/L (ref 136–145)
T WAVE AXIS: 73 DEGREES
VENTRICULAR RATE: 105 BPM
WBC # BLD AUTO: 22.63 THOUSAND/UL (ref 4.31–10.16)

## 2019-01-11 PROCEDURE — 82948 REAGENT STRIP/BLOOD GLUCOSE: CPT

## 2019-01-11 PROCEDURE — 71045 X-RAY EXAM CHEST 1 VIEW: CPT

## 2019-01-11 PROCEDURE — 76870 US EXAM SCROTUM: CPT

## 2019-01-11 PROCEDURE — 99232 SBSQ HOSP IP/OBS MODERATE 35: CPT | Performed by: INTERNAL MEDICINE

## 2019-01-11 PROCEDURE — 80048 BASIC METABOLIC PNL TOTAL CA: CPT | Performed by: HOSPITALIST

## 2019-01-11 PROCEDURE — 93010 ELECTROCARDIOGRAM REPORT: CPT | Performed by: INTERNAL MEDICINE

## 2019-01-11 PROCEDURE — 99232 SBSQ HOSP IP/OBS MODERATE 35: CPT | Performed by: HOSPITALIST

## 2019-01-11 PROCEDURE — 85027 COMPLETE CBC AUTOMATED: CPT | Performed by: INTERNAL MEDICINE

## 2019-01-11 PROCEDURE — 84145 PROCALCITONIN (PCT): CPT | Performed by: INTERNAL MEDICINE

## 2019-01-11 RX ORDER — ONDANSETRON 2 MG/ML
INJECTION INTRAMUSCULAR; INTRAVENOUS
Status: COMPLETED
Start: 2019-01-11 | End: 2019-01-11

## 2019-01-11 RX ORDER — ONDANSETRON 2 MG/ML
4 INJECTION INTRAMUSCULAR; INTRAVENOUS EVERY 4 HOURS PRN
Status: DISCONTINUED | OUTPATIENT
Start: 2019-01-11 | End: 2019-01-22 | Stop reason: HOSPADM

## 2019-01-11 RX ORDER — FUROSEMIDE 10 MG/ML
20 INJECTION INTRAMUSCULAR; INTRAVENOUS ONCE
Status: COMPLETED | OUTPATIENT
Start: 2019-01-11 | End: 2019-01-11

## 2019-01-11 RX ADMIN — ASPIRIN 81 MG: 81 TABLET, COATED ORAL at 08:55

## 2019-01-11 RX ADMIN — ACETAMINOPHEN 650 MG: 325 TABLET ORAL at 22:16

## 2019-01-11 RX ADMIN — METOPROLOL TARTRATE 25 MG: 25 TABLET, FILM COATED ORAL at 22:08

## 2019-01-11 RX ADMIN — FUROSEMIDE 20 MG: 10 INJECTION, SOLUTION INTRAMUSCULAR; INTRAVENOUS at 13:17

## 2019-01-11 RX ADMIN — ONDANSETRON 4 MG: 2 INJECTION INTRAMUSCULAR; INTRAVENOUS at 00:20

## 2019-01-11 RX ADMIN — CLOPIDOGREL 75 MG: 75 TABLET, FILM COATED ORAL at 08:55

## 2019-01-11 RX ADMIN — HEPARIN SODIUM 5000 UNITS: 5000 INJECTION INTRAVENOUS; SUBCUTANEOUS at 13:17

## 2019-01-11 RX ADMIN — SODIUM CHLORIDE, SODIUM LACTATE, POTASSIUM CHLORIDE, AND CALCIUM CHLORIDE 50 ML/HR: .6; .31; .03; .02 INJECTION, SOLUTION INTRAVENOUS at 05:38

## 2019-01-11 RX ADMIN — AMPICILLIN SODIUM 2000 MG: 2 INJECTION, POWDER, FOR SOLUTION INTRAMUSCULAR; INTRAVENOUS at 12:21

## 2019-01-11 RX ADMIN — METOPROLOL TARTRATE 25 MG: 25 TABLET, FILM COATED ORAL at 03:27

## 2019-01-11 RX ADMIN — INSULIN LISPRO 2 UNITS: 100 INJECTION, SOLUTION INTRAVENOUS; SUBCUTANEOUS at 16:56

## 2019-01-11 RX ADMIN — HEPARIN SODIUM 5000 UNITS: 5000 INJECTION INTRAVENOUS; SUBCUTANEOUS at 05:38

## 2019-01-11 RX ADMIN — INSULIN LISPRO 1 UNITS: 100 INJECTION, SOLUTION INTRAVENOUS; SUBCUTANEOUS at 12:22

## 2019-01-11 RX ADMIN — NICOTINE 1 PATCH: 21 PATCH, EXTENDED RELEASE TRANSDERMAL at 08:54

## 2019-01-11 RX ADMIN — ATORVASTATIN CALCIUM 40 MG: 40 TABLET, FILM COATED ORAL at 16:55

## 2019-01-11 RX ADMIN — INSULIN LISPRO 1 UNITS: 100 INJECTION, SOLUTION INTRAVENOUS; SUBCUTANEOUS at 22:08

## 2019-01-11 RX ADMIN — HEPARIN SODIUM 5000 UNITS: 5000 INJECTION INTRAVENOUS; SUBCUTANEOUS at 22:08

## 2019-01-11 RX ADMIN — AMPICILLIN SODIUM 2000 MG: 2 INJECTION, POWDER, FOR SOLUTION INTRAMUSCULAR; INTRAVENOUS at 23:56

## 2019-01-11 RX ADMIN — METOPROLOL TARTRATE 25 MG: 25 TABLET, FILM COATED ORAL at 08:55

## 2019-01-11 RX ADMIN — ISOSORBIDE MONONITRATE 30 MG: 30 TABLET, EXTENDED RELEASE ORAL at 08:55

## 2019-01-11 RX ADMIN — METOPROLOL TARTRATE 25 MG: 25 TABLET, FILM COATED ORAL at 14:57

## 2019-01-11 RX ADMIN — INSULIN GLARGINE 10 UNITS: 100 INJECTION, SOLUTION SUBCUTANEOUS at 22:08

## 2019-01-11 RX ADMIN — AMPICILLIN SODIUM 2000 MG: 2 INJECTION, POWDER, FOR SOLUTION INTRAMUSCULAR; INTRAVENOUS at 17:22

## 2019-01-11 NOTE — PROGRESS NOTES
Progress Note - Tasneem Huerta 72 y o  male MRN: [id number]    Unit/Bed#: ICU 10 Encounter: [id number]    Principal Problem:    SIRS (systemic inflammatory response syndrome) (HCC)  Active Problems:    Acute cystitis without hematuria    Tobacco abuse    Lactic acidosis    Elevated troponin I level    Shortness of breath  Resolved Problems:    * No resolved hospital problems  *      Assessment/Plan:  1-severe sepsis on presentation-secondary to 2-patient presented with fever, leukopenia, lactic acidosis with blood cultures growing E coli  Likely urinary source  On IV ampicillin -changed from IV ceftriaxone  Plan to continue 7 days antibiotics through 01/15/2019  X-ray chest suggestive of infiltrate right lower lobe however patient appears stable from a respiratory point of view  Procalcitonin elevated at 90 which is now trended down to 25  Will repeat procalcitonin tomorrow  Will continue supportive care and monitoring in step-down     2-E coli bacteremia secondary to 3-genitourinary source  Will follow up on final cultures and tailor antibiotics as indicated  Patient also complaining of scrotal pain hence will get an ultrasound to rule out epididymitis  Id input appreciated  cefepime changed to ampicillin      3-urinary retention on admission status post Marshall placement  CT revealing prostatomegaly  Will need urology follow-up either inpatient or outpatient      4-diabetes mellitus type 2 on insulin  Patient on Lantus 40 units in the afternoon--this has been changed to a sliding scale coverage  Will add Lantus 10 units at night  Will check glycosylated hemoglobin     5-elevated troponin-likely non ST elevation type 2 secondary to sepsis  Continue on metoprolol and aspirin  No chest pain currently  Patient was also on aim Dur at home      6-coronary artery disease status post stent and CABG  Was on aspirin and Plavix at home      7-essential hypertension  Was on lisinopril 10 mg daily at home  Blood pressure currently low normal hence will hold off of lisinopril      Plan  Ultrasound of the scrotum to rule out epididymitis  Continue IV antibiotics  Will transfer patient out of step-down         Subjective:  Patient feels improved although tired  He does have some shortness of breath  X-ray chest suggestive of an infiltrate  Procalcitonin trending down  Scrotal ultrasound ordered to rule out epididymitis  Physical Exam:   Vitals: Blood pressure 150/83, pulse 90, temperature 99 5 °F (37 5 °C), temperature source Temporal, resp  rate 20, height 5' 8" (1 727 m), weight 71 7 kg (158 lb 1 1 oz), SpO2 (!) 85 %  ,Body mass index is 24 03 kg/m²  Gen:  Pleasant, non-tachypnic, non-dyspnic  Conversant  Heart: regular rate and rhythm, S1S2 present, no murmur, rub or gallop  Lungs: clear to ausculatation bilaterally  No wheezing, crackless, or rhonchi  No accessory muscle use or respiratory distress  Abd: soft, non-tender, non-distended  NABS, no guarding, rebound or peritoneal signs  Extremities: no clubbing, cyanosis or edema  2+pedal pulses bilaterally  Full range of motion  Neuro: awake, alert and oriented  Cranial nerves 2-12 intact  Strength and sensation grossly intact  Skin: warm and dry: no petechiae, purpura and rash      LABS:     Results from last 7 days  Lab Units 01/11/19  0440 01/10/19  0433 01/09/19  0635   WBC Thousand/uL 22 63* 23 04* 7 46   HEMOGLOBIN g/dL 12 3 12 1 12 6   HEMATOCRIT % 35 3* 35 2* 37 6   PLATELETS Thousands/uL 117* 120* 144*       Results from last 7 days  Lab Units 01/11/19  0440 01/10/19  0433 01/09/19  0635 01/09/19  0241   POTASSIUM mmol/L 4 2 4 9 3 1*  --    CHLORIDE mmol/L 101 105 101  --    CO2 mmol/L 24 27 16*  --    CO2, I-STAT mmol/L  --   --   --  23   BUN mg/dL 14 14 16  --    CREATININE mg/dL 0 81 0 96 1 29  --    GLUCOSE, ISTAT mg/dl  --   --   --  212*   CALCIUM mg/dL 8 6 8 1* 8 6  --        Intake/Output Summary (Last 24 hours) at 01/11/19 1200  Last data filed at 01/11/19 0600   Gross per 24 hour   Intake              950 ml   Output             1250 ml   Net             -300 ml           Current Facility-Administered Medications:  acetaminophen 650 mg Oral Q6H PRN Bailey Stovall PA-C    ampicillin 2,000 mg Intravenous Q6H Joaquín Isabel MD    aspirin 81 mg Oral Daily Aurora Sweeney MD    atorvastatin 40 mg Oral Daily With Kaden Watters MD    clopidogrel 75 mg Oral Daily Crystal Harris MD    heparin (porcine) 5,000 Units Subcutaneous [facility] Bailey Stovall PA-C    insulin glargine 10 Units Subcutaneous HS Crystal Harris MD    insulin lispro 1-5 Units Subcutaneous TID Leoncio Fischer MD    insulin lispro 1-5 Units Subcutaneous HS Kay Hines MD    isosorbide mononitrate 30 mg Oral Daily Crystal Harris MD    lactated ringers 50 mL/hr Intravenous Continuous Aurora Sweeney MD Last Rate: 50 mL/hr (01/11/19 0538)   metoprolol tartrate 25 mg Oral Q6H Aurora Sweeney MD    nicotine 1 patch Transdermal Daily Bailey Stovall PA-C    ondansetron 4 mg Intravenous Q4H PRN VARGAS Ferris        Family update- will update daughter

## 2019-01-11 NOTE — PROGRESS NOTES
Progress Note - Cardiology   Lanny Fischer 72 y o  male MRN: [id number]  Unit/Bed#: ICU 10 Encounter: [id number]      Assessment:     1  Severe sepsis with E coli bacteremia likely urinary source  2  Elevated troponin-likely type 2 non ST elevation myocardial infarction in the setting of sepsis-peak troponin of 11 5  3  Coronary disease status post coronary bypass grafting and stenting in the past with ejection fraction currently 45% with severe hypokinesis of the basal to mid inferior and basal to mid inferolateral walls with mild to moderate aortic stenosis and mild-to-moderate mitral regurgitation  4  Diabetes  5  Hypertension  6  Tobacco use with COPD on imaging  7  Large noncalcified abdominal aortic plaque with areas of ulceration  8  NSVT    Discussion/Recommendations:    Continue aspirin/Plavix/statin/beta-blocker  Would check chest x-ray-may need diuresis  Will need records regarding most recent ischemic workup-may need updated ischemic workup in the near future once illness passes  Addendum-will give dose of IV Lasix today and assess response      Subjective:  No chest pain or trouble breathing-fatigue      Physical Exam:  GEN:  NAD  HEENT:  MMM, NCAT, pink conjunctiva, EOMI, nonicteric sclera  CV:  NO JVD/HJR, RR, NO M/R/G, +S1/S2, NO PARASTERNAL HEAVE/THRILL, NO LE EDEMA, NO HEPATIC SYSTOLIC PULSATION, WARM EXTREMITIES  RESP:  Bilateral rhonchi  ABD:  SOFT, NT, NO GROSS ORGANOMEGALY        Vitals:   /83   Pulse 90   Temp 98 °F (36 7 °C) (Temporal)   Resp 20   Ht 5' 8" (1 727 m)   Wt 71 7 kg (158 lb 1 1 oz)   SpO2 (!) 85%   BMI 24 03 kg/m²   Vitals:    01/09/19 1123 01/10/19 0537   Weight: 70 7 kg (155 lb 13 8 oz) 71 7 kg (158 lb 1 1 oz)       Intake/Output Summary (Last 24 hours) at 01/11/19 0742  Last data filed at 01/11/19 0600   Gross per 24 hour   Intake          1451 25 ml   Output             2060 ml   Net          -608 75 ml         TELEMETRY:  Brief NSVT  Lab Results:    Results from last 7 days  Lab Units 01/11/19 0440   WBC Thousand/uL 22 63*   HEMOGLOBIN g/dL 12 3   HEMATOCRIT % 35 3*   PLATELETS Thousands/uL 117*       Results from last 7 days  Lab Units 01/11/19 0440 01/09/19  0241 01/09/19  0237   POTASSIUM mmol/L 4 2  < >  --  3 5   CHLORIDE mmol/L 101  < >  --  95*   CO2 mmol/L 24  < >  --  25   CO2, I-STAT mmol/L  --   --  23  --    BUN mg/dL 14  < >  --  14   CREATININE mg/dL 0 81  < >  --  1 05   CALCIUM mg/dL 8 6  < >  --  9 0   ALK PHOS U/L  --   --   --  139*   ALT U/L  --   --   --  32   AST U/L  --   --   --  26   GLUCOSE, ISTAT mg/dl  --   --  212*  --    < > = values in this interval not displayed  Results from last 7 days  Lab Units 01/11/19 0440 01/09/19  0241   POTASSIUM mmol/L 4 2  < >  --    CHLORIDE mmol/L 101  < >  --    CO2 mmol/L 24  < >  --    CO2, I-STAT mmol/L  --   --  23   BUN mg/dL 14  < >  --    CREATININE mg/dL 0 81  < >  --    GLUCOSE, ISTAT mg/dl  --   --  212*   CALCIUM mg/dL 8 6  < >  --    < > = values in this interval not displayed            Medications:    Current Facility-Administered Medications:     acetaminophen (TYLENOL) tablet 650 mg, 650 mg, Oral, Q6H PRN, Stephanie Levin PA-C, 650 mg at 01/10/19 0630    aspirin (ECOTRIN LOW STRENGTH) EC tablet 81 mg, 81 mg, Oral, Daily, Mildred Waller MD, 81 mg at 01/10/19 0855    atorvastatin (LIPITOR) tablet 40 mg, 40 mg, Oral, Daily With Maliha Jack MD, 40 mg at 01/10/19 1627    cefTRIAXone (ROCEPHIN) 1,000 mg in dextrose 5 % 50 mL IVPB, 1,000 mg, Intravenous, Q24H, Piero Barry MD, Stopped at 01/10/19 2207    clopidogrel (PLAVIX) tablet 75 mg, 75 mg, Oral, Daily, Shan David MD    heparin (porcine) subcutaneous injection 5,000 Units, 5,000 Units, Subcutaneous, Q8H [address], 5,000 Units at 01/11/19 0538 **AND** [CANCELED] Platelet count, , , Once, Stephanie Levin PA-C    insulin glargine (LANTUS) subcutaneous injection 10 Units 0 1 mL, 10 Units, Subcutaneous, HS, Faawd Robles MD, 10 Units at 01/10/19 2137    insulin lispro (HumaLOG) 100 units/mL subcutaneous injection 1-5 Units, 1-5 Units, Subcutaneous, TID AC, 2 Units at 01/10/19 1627 **AND** Fingerstick Glucose (POCT), , , TID AC, Kay Hines MD    insulin lispro (HumaLOG) 100 units/mL subcutaneous injection 1-5 Units, 1-5 Units, Subcutaneous, HS, Fawad Robles MD, 1 Units at 01/10/19 2138    isosorbide mononitrate (IMDUR) 24 hr tablet 30 mg, 30 mg, Oral, Daily, Fawad Robles MD    lactated ringers infusion, 50 mL/hr, Intravenous, Continuous, Janneth Chapin MD, Last Rate: 50 mL/hr at 01/11/19 0538, 50 mL/hr at 01/11/19 0538    metoprolol tartrate (LOPRESSOR) tablet 25 mg, 25 mg, Oral, Q6H, Janneth Chapin MD, 25 mg at 01/11/19 0327    nicotine (NICODERM CQ) 21 mg/24 hr TD 24 hr patch 1 patch, 1 patch, Transdermal, Daily, Saturnino Valle PA-C, 1 patch at 01/10/19 0817    ondansetron (ZOFRAN) injection 4 mg, 4 mg, Intravenous, Q4H PRN, Willim Kohut Spatzer, CRNP, 4 mg at 01/11/19 0020    Portions of the record may have been created with voice recognition software  Occasional wrong word or "sound a like" substitutions may have occurred due to the inherent limitations of voice recognition software  Read the chart carefully and recognize, using context, where substitutions have occurred

## 2019-01-11 NOTE — PROGRESS NOTES
Progress Note - Infectious Disease   Rachel Rosado 72 y o  male MRN: [id number]  Unit/Bed#: ICU 10 Encounter: [id number]      Impression/Recommendations:  1  Severe sepsis  POA:  Fever, leukopenia, and lactic acidosis  Due to # 2/3  CXR with evolving infiltrate but no clinical signs of pneumonia  CT A/P negative  Hemodynamically stable and nontoxic  Clinically improving  Procalcitonin 91 49 >> 25 82  Rec:  ? Continue antibiotics as below  ? Follow temperatures closely  ? Supportive care as per the primary service     2   E coli bacteremia  Due to # 3  CT A/P otherwise negative  Rec:  ? Tailor antibiotics to ampicillin likely to continue for 7 days total of antibiotics through 1/15/19     3  Gram-negative UTI  With pyuria and urinary retention  Likely E coli given # 2  Low risk for MDRO given lack of recent hospitalizations  Rec:  ? Continue antibiotics as above  ? Follow-up final urine cultures and tailor antibiotics as indicated although suspect will be same E  Coli in blood  ? Follow up scrotal U/S per primary     4   Urinary retention  Consider BPH  Status post Marshall  Rec:  ? Continue Marshall catheter per primary service  ? Follow urine output closely  ? Consider urology consultation     5  DM with hyperglycemia  On insulin  Probable risk factor for above infection  Rec:  ? Continue management as per the primary service     The patient is stable from an ID standpoint  I will reassess the patient on Monday 1/14  Please call in the interim with new questions  Antibiotics:  Ceftriaxone #2  Antibiotics #3    Subjective:  Patient seen on AM rounds  "Tired"  Denies fevers, chills, sweats, nausea, vomiting, or diarrhea  24 Hour Events:  No documented fevers, chills, sweats, nausea, vomiting, or diarrhea      Objective:  Vitals:  Temp:  [98 °F (36 7 °C)-100 8 °F (38 2 °C)] 99 5 °F (37 5 °C)  HR:  [88-96] 90  Resp:  [19-30] 20  BP: (113-150)/(57-83) 150/83  SpO2:  [85 %-95 %] 85 %  Temp (24hrs), Av 3 °F (37 4 °C), Min:98 °F (36 7 °C), Max:100 8 °F (38 2 °C)  Current: Temperature: 99 5 °F (37 5 °C)    Physical Exam:   General:  No acute distress  Psychiatric:  Awake and alert  Pulmonary:  Normal respiratory excursion without accessory muscle use  Abdomen:  Soft, nontender  Extremities:  No edema  Skin:  No rashes  :  Scrotum mildly erythematous, less tender, no induration or swelling    Lab Results:  I have personally reviewed pertinent labs  Results from last 7 days  Lab Units 19  0440 01/10/19  0433 19  0635 19  0241 19  0237   POTASSIUM mmol/L 4 2 4 9 3 1*  --  3 5   CHLORIDE mmol/L 101 105 101  --  95*   CO2 mmol/L 24 27 16*  --  25   CO2, I-STAT mmol/L  --   --   --  23  --    BUN mg/dL 14 14 16  --  14   CREATININE mg/dL 0 81 0 96 1 29  --  1 05   EGFR ml/min/1 73sq m 89 79 55 90 71   GLUCOSE, ISTAT mg/dl  --   --   --  212*  --    CALCIUM mg/dL 8 6 8 1* 8 6  --  9 0   AST U/L  --   --   --   --  26   ALT U/L  --   --   --   --  32   ALK PHOS U/L  --   --   --   --  139*       Results from last 7 days  Lab Units 19  0440 01/10/19  0433 19  0635   WBC Thousand/uL 22 63* 23 04* 7 46   HEMOGLOBIN g/dL 12 3 12 1 12 6   PLATELETS Thousands/uL 117* 120* 144*       Results from last 7 days  Lab Units 19  0304 19  0239 19  0237   BLOOD CULTURE   --  Escherichia coli* Escherichia coli*   GRAM STAIN RESULT   --  Gram negative rods Gram negative rods   URINE CULTURE  >100,000 cfu/ml Gram Negative Ishmael Enteric Like*  --   --    INFLUENZA B PCR   --   --  None Detected   RSV PCR   --   --  None Detected       Imaging Studies:   I have personally reviewed pertinent imaging study reports and images in PACS  CR evolving RLL inifltrate    EKG, Pathology, and Other Studies:   I have personally reviewed pertinent reports

## 2019-01-12 LAB
ANION GAP SERPL CALCULATED.3IONS-SCNC: 7 MMOL/L (ref 4–13)
BASOPHILS # BLD AUTO: 0.06 THOUSANDS/ΜL (ref 0–0.1)
BASOPHILS NFR BLD AUTO: 0 % (ref 0–1)
BUN SERPL-MCNC: 12 MG/DL (ref 5–25)
CALCIUM SERPL-MCNC: 8 MG/DL (ref 8.3–10.1)
CHLORIDE SERPL-SCNC: 102 MMOL/L (ref 100–108)
CO2 SERPL-SCNC: 27 MMOL/L (ref 21–32)
CREAT SERPL-MCNC: 0.79 MG/DL (ref 0.6–1.3)
EOSINOPHIL # BLD AUTO: 0.21 THOUSAND/ΜL (ref 0–0.61)
EOSINOPHIL NFR BLD AUTO: 1 % (ref 0–6)
ERYTHROCYTE [DISTWIDTH] IN BLOOD BY AUTOMATED COUNT: 13.4 % (ref 11.6–15.1)
GFR SERPL CREATININE-BSD FRML MDRD: 90 ML/MIN/1.73SQ M
GLUCOSE SERPL-MCNC: 144 MG/DL (ref 65–140)
GLUCOSE SERPL-MCNC: 158 MG/DL (ref 65–140)
GLUCOSE SERPL-MCNC: 191 MG/DL (ref 65–140)
GLUCOSE SERPL-MCNC: 221 MG/DL (ref 65–140)
GLUCOSE SERPL-MCNC: 230 MG/DL (ref 65–140)
HCT VFR BLD AUTO: 35.2 % (ref 36.5–49.3)
HGB BLD-MCNC: 12.2 G/DL (ref 12–17)
IMM GRANULOCYTES # BLD AUTO: 0.05 THOUSAND/UL (ref 0–0.2)
IMM GRANULOCYTES NFR BLD AUTO: 0 % (ref 0–2)
LYMPHOCYTES # BLD AUTO: 1.13 THOUSANDS/ΜL (ref 0.6–4.47)
LYMPHOCYTES NFR BLD AUTO: 7 % (ref 14–44)
MCH RBC QN AUTO: 32.2 PG (ref 26.8–34.3)
MCHC RBC AUTO-ENTMCNC: 34.7 G/DL (ref 31.4–37.4)
MCV RBC AUTO: 93 FL (ref 82–98)
MONOCYTES # BLD AUTO: 1.07 THOUSAND/ΜL (ref 0.17–1.22)
MONOCYTES NFR BLD AUTO: 7 % (ref 4–12)
NEUTROPHILS # BLD AUTO: 13.95 THOUSANDS/ΜL (ref 1.85–7.62)
NEUTS SEG NFR BLD AUTO: 85 % (ref 43–75)
NRBC BLD AUTO-RTO: 0 /100 WBCS
PLATELET # BLD AUTO: 115 THOUSANDS/UL (ref 149–390)
PMV BLD AUTO: 9.7 FL (ref 8.9–12.7)
POTASSIUM SERPL-SCNC: 3 MMOL/L (ref 3.5–5.3)
PROCALCITONIN SERPL-MCNC: 13.42 NG/ML
RBC # BLD AUTO: 3.79 MILLION/UL (ref 3.88–5.62)
SODIUM SERPL-SCNC: 136 MMOL/L (ref 136–145)
WBC # BLD AUTO: 16.47 THOUSAND/UL (ref 4.31–10.16)

## 2019-01-12 PROCEDURE — 0TPBX0Z REMOVAL OF DRAINAGE DEVICE FROM BLADDER, EXTERNAL APPROACH: ICD-10-PCS | Performed by: FAMILY MEDICINE

## 2019-01-12 PROCEDURE — 99232 SBSQ HOSP IP/OBS MODERATE 35: CPT | Performed by: HOSPITALIST

## 2019-01-12 PROCEDURE — 84145 PROCALCITONIN (PCT): CPT | Performed by: HOSPITALIST

## 2019-01-12 PROCEDURE — 99254 IP/OBS CNSLTJ NEW/EST MOD 60: CPT | Performed by: UROLOGY

## 2019-01-12 PROCEDURE — 85025 COMPLETE CBC W/AUTO DIFF WBC: CPT | Performed by: HOSPITALIST

## 2019-01-12 PROCEDURE — 99232 SBSQ HOSP IP/OBS MODERATE 35: CPT | Performed by: INTERNAL MEDICINE

## 2019-01-12 PROCEDURE — 80048 BASIC METABOLIC PNL TOTAL CA: CPT | Performed by: HOSPITALIST

## 2019-01-12 PROCEDURE — 82948 REAGENT STRIP/BLOOD GLUCOSE: CPT

## 2019-01-12 RX ORDER — GUAIFENESIN 100 MG/5ML
200 SOLUTION ORAL EVERY 4 HOURS PRN
Status: DISCONTINUED | OUTPATIENT
Start: 2019-01-12 | End: 2019-01-22 | Stop reason: HOSPADM

## 2019-01-12 RX ORDER — POTASSIUM CHLORIDE 20 MEQ/1
40 TABLET, EXTENDED RELEASE ORAL
Status: DISPENSED | OUTPATIENT
Start: 2019-01-12 | End: 2019-01-13

## 2019-01-12 RX ORDER — POTASSIUM CHLORIDE 20 MEQ/1
40 TABLET, EXTENDED RELEASE ORAL ONCE
Status: COMPLETED | OUTPATIENT
Start: 2019-01-12 | End: 2019-01-12

## 2019-01-12 RX ORDER — LANOLIN ALCOHOL/MO/W.PET/CERES
6 CREAM (GRAM) TOPICAL
Status: DISCONTINUED | OUTPATIENT
Start: 2019-01-12 | End: 2019-01-22 | Stop reason: HOSPADM

## 2019-01-12 RX ADMIN — INSULIN LISPRO 2 UNITS: 100 INJECTION, SOLUTION INTRAVENOUS; SUBCUTANEOUS at 21:53

## 2019-01-12 RX ADMIN — METOPROLOL TARTRATE 25 MG: 25 TABLET, FILM COATED ORAL at 17:23

## 2019-01-12 RX ADMIN — GUAIFENESIN 200 MG: 100 SOLUTION ORAL at 23:31

## 2019-01-12 RX ADMIN — NICOTINE 1 PATCH: 21 PATCH, EXTENDED RELEASE TRANSDERMAL at 08:43

## 2019-01-12 RX ADMIN — MELATONIN TAB 3 MG 6 MG: 3 TAB at 23:31

## 2019-01-12 RX ADMIN — AMPICILLIN SODIUM 2000 MG: 2 INJECTION, POWDER, FOR SOLUTION INTRAMUSCULAR; INTRAVENOUS at 17:29

## 2019-01-12 RX ADMIN — METOPROLOL TARTRATE 25 MG: 25 TABLET, FILM COATED ORAL at 08:43

## 2019-01-12 RX ADMIN — INSULIN GLARGINE 10 UNITS: 100 INJECTION, SOLUTION SUBCUTANEOUS at 21:52

## 2019-01-12 RX ADMIN — HEPARIN SODIUM 5000 UNITS: 5000 INJECTION INTRAVENOUS; SUBCUTANEOUS at 05:00

## 2019-01-12 RX ADMIN — ONDANSETRON 4 MG: 2 INJECTION INTRAMUSCULAR; INTRAVENOUS at 22:13

## 2019-01-12 RX ADMIN — ISOSORBIDE MONONITRATE 30 MG: 30 TABLET, EXTENDED RELEASE ORAL at 08:43

## 2019-01-12 RX ADMIN — INSULIN LISPRO 1 UNITS: 100 INJECTION, SOLUTION INTRAVENOUS; SUBCUTANEOUS at 17:23

## 2019-01-12 RX ADMIN — INSULIN LISPRO 1 UNITS: 100 INJECTION, SOLUTION INTRAVENOUS; SUBCUTANEOUS at 08:43

## 2019-01-12 RX ADMIN — AMPICILLIN SODIUM 2000 MG: 2 INJECTION, POWDER, FOR SOLUTION INTRAMUSCULAR; INTRAVENOUS at 12:18

## 2019-01-12 RX ADMIN — METOPROLOL TARTRATE 25 MG: 25 TABLET, FILM COATED ORAL at 21:53

## 2019-01-12 RX ADMIN — INSULIN LISPRO 1 UNITS: 100 INJECTION, SOLUTION INTRAVENOUS; SUBCUTANEOUS at 12:18

## 2019-01-12 RX ADMIN — POTASSIUM CHLORIDE 40 MEQ: 1500 TABLET, EXTENDED RELEASE ORAL at 17:23

## 2019-01-12 RX ADMIN — ACETAMINOPHEN 650 MG: 325 TABLET ORAL at 07:09

## 2019-01-12 RX ADMIN — ATORVASTATIN CALCIUM 40 MG: 40 TABLET, FILM COATED ORAL at 17:23

## 2019-01-12 RX ADMIN — POTASSIUM CHLORIDE 40 MEQ: 1500 TABLET, EXTENDED RELEASE ORAL at 08:53

## 2019-01-12 RX ADMIN — AMPICILLIN SODIUM 2000 MG: 2 INJECTION, POWDER, FOR SOLUTION INTRAMUSCULAR; INTRAVENOUS at 04:46

## 2019-01-12 RX ADMIN — ASPIRIN 81 MG: 81 TABLET, COATED ORAL at 08:43

## 2019-01-12 RX ADMIN — AMPICILLIN SODIUM 2000 MG: 2 INJECTION, POWDER, FOR SOLUTION INTRAMUSCULAR; INTRAVENOUS at 23:31

## 2019-01-12 RX ADMIN — METOPROLOL TARTRATE 25 MG: 25 TABLET, FILM COATED ORAL at 04:05

## 2019-01-12 NOTE — CONSULTS
UROLOGY CONSULTATION NOTE     Patient Identifiers: Elidia Kelly (MRN [id number])  Service Requesting Consultation:  Slim  Service Providing Consultation:  Urology, [place]i Guerra MD    Date of Service: 1/12/2019    Reason for Consultation:  Urinary retention    History of Present Illness:     Elidia Kelly is a 72 y o  with a history of shortness of breath  Admitted to the medical service with a fever of 101 1  Currently undergoing workup for sepsis  White blood cell count on admission 22 63  White blood cell count today 16 47  A Marshall catheter was placed and urine output has been brisk with 2 955 L over the last 24 hours  Infectious disease following  Likely source of the E coli bacteremia is the urinary tract between his UTI and retention  He has never been evaluated by [facility]  His catheter had apparently been draining well and today there was some blood draining around the catheter  The Internal Medicine team remove the Marshall catheter this morning prior to urologic consultation  The patient has not urinated since    Past Medical, Past Surgical History:     Past Medical History:   Diagnosis Date    Diabetes mellitus ([facility] [address] )     Hypertension      Past Surgical History:   Procedure Laterality Date    CORONARY ARTERY BYPASS GRAFT        Medications, Allergies:   Scheduled Meds:  Current Facility-Administered Medications:  acetaminophen 650 mg Oral Q6H PRN Magalie Hubbard PA-C    ampicillin 2,000 mg Intravenous Q6H Tito Dawn MD Last Rate: 2,000 mg (01/12/19 0446)   aspirin 81 mg Oral Daily Denita Baker MD    atorvastatin 40 mg Oral Daily With Maricruz Fletcher MD    insulin glargine 10 Units Subcutaneous HS Brannon Sousa MD    insulin lispro 1-5 Units Subcutaneous TID Chris Melgoza MD    insulin lispro 1-5 Units Subcutaneous HS Brannon Sousa MD    isosorbide mononitrate 30 mg Oral Daily Brannon Sousa MD    lactated ringers 50 mL/hr Intravenous Continuous Denita Baker MD Last Rate: 50 mL/hr (01/11/19 0538)   metoprolol tartrate 25 mg Oral Q6H Sasha Mtz MD    nicotine 1 patch Transdermal Daily Petty Lopez PA-C    ondansetron 4 mg Intravenous Q4H PRN Clearnce Pucker Spatzer, CRNP      Continuous Infusions:  lactated ringers 50 mL/hr Last Rate: 50 mL/hr (01/11/19 0538)     PRN Meds:   acetaminophen    ondansetron    Allergies:  No Known Allergies:    Social and Family History:   Social History:   Social History   Substance Use Topics    Smoking status: Current Every Day Smoker    Smokeless tobacco: Not on file    Alcohol use Not on file     History   Smoking Status    Current Every Day Smoker   Smokeless Tobacco    Not on file       Family History:  History reviewed  No pertinent family history :     Review of Systems:   He reports lethargy and fever  He denies active chest pain or shortness of breath but did report this earlier  He denies any prior lower urinary tract symptoms  All other systems queried were negative  Physical Exam:     Vitals:    01/12/19 0709   BP: 136/63   Pulse: 81   Resp: 20   Temp: 98 5 °F (36 9 °C)   SpO2: 95%       PE:  General:  Somewhat ill appearing  HEENT:  Normocephalic, atraumatic, sclerae nonicteric  Cardiac:  Regular  Abdomen:  Soft nontender nondistended  :  Normal phallus, scrotum and scrotal contents  There is some blood at the meatus  There is no longer Marshall catheter in place  The bladder is nonpalpable    There is no CVA tenderness  Skin:  Warm  Extremities:  Without edema  Neurologic:  Grossly intact and nonfocal  Affect:  Appropriate    Labs:     Lab Results   Component Value Date    HGB 12 2 01/12/2019    HCT 35 2 (L) 01/12/2019    WBC 16 47 (H) 01/12/2019     (L) 01/12/2019   ]    Lab Results   Component Value Date    K 3 0 (L) 01/12/2019     01/12/2019    CO2 27 01/12/2019    BUN 12 01/12/2019    CREATININE 0 79 01/12/2019    CALCIUM 8 0 (L) 01/12/2019    GLUCOSE 212 (H) 01/09/2019   ]    Imaging:   CT scan suggestive of mild gastritis  Scrotal ultrasound performed concerning for hyperemic flow in the left testicle    ASSESSMENT:     72 y o  old male with  urinary retention and UTI, no sign of left epididymitis on physical examination  PLAN:     I recommend continuing antibiotics per Infectious Disease  Continue to follow urine cultures  I am concerned that the patient's Marshall catheter was removed today  I would obtain a postvoid residual assessment after each spontaneous void and straight catheterize the patient as needed for residual more than 300 cc  If the patient fails 3 successive postvoid residual evaluations, I would reinsert the Marshall catheter  There is no clinical sign of epididymitis on physical examination despite ultrasound findings  Thank you for allowing me to participate in this patients care  Please do not hesitate to call with any additional questions    Daniela Gee MD

## 2019-01-12 NOTE — PROGRESS NOTES
Progress Note - Jarrett Showers 72 y o  male MRN: [id number]    Unit/Bed#: E5 -01 Encounter: [id number]    Principal Problem:    SIRS (systemic inflammatory response syndrome) (HCC)  Active Problems:    Acute cystitis without hematuria    Tobacco abuse    Lactic acidosis    Elevated troponin I level    Shortness of breath        Assessment/Plan:  1-severe sepsis on presentation-secondary to 2-patient presented with fever, leukopenia, lactic acidosis with blood cultures growing E coli   Likely urinary source   On IV ampicillin -changed from IV ceftriaxone  Plan to continue 7 days antibiotics through 01/15/2019  X-ray chest suggestive of infiltrate right lower lobe however patient appears stable from a respiratory point of view    Procalcitonin elevated at 90 which is now trended down to 13   Will repeat procalcitonin tomorrow  Diogo Levy continue supportive care and monitoring for any recurrence of temperature or elevation of WBC     2-E coli bacteremia secondary to 3-genitourinary source   Will follow up on final cultures and tailor antibiotics as indicated   Patient also complaining of scrotal pain and ultrasound suggestive of right-sided epididymitis however on clinical examination no evidence of epididymitis per Urology  Rosa Cruz input appreciated   Continue IV ampicillin      3-urinary retention on admission status post Marshall placement   CT revealing prostatomegaly  Patient was insistent on discontinuing the Marshall catheter this morning  Has not voided since    Will continue to monitor and check postvoid residual of more than 300 will straight cath x3 and if more than 3 straight caths needed then will replace Marshall and have him follow up with Urology as an outpatient     4-diabetes mellitus type 2 on insulin   Patient on Lantus 40 units in the afternoon--this has been changed to a sliding scale coverage   Will add Lantus 10 units at night   Will check glycosylated hemoglobin     5-elevated troponin-likely non ST elevation type 2 secondary to sepsis   Continue on metoprolol and aspirin   No chest pain currently   Patient was also on imdur at home      6-coronary artery disease status post stent and CABG   Was on aspirin and Plavix at home      7-essential hypertension   Was on lisinopril 10 mg daily at home   Blood pressure currently low normal hence will hold off of lisinopril      8-coronary artery disease status post CABG with EF 45% with hypokinesis of the basal to mid inferior lead atrial walls  --appears stable at present  Continue to monitor urinary status and if more than 3 straight cath as needed will replace Marshall  Continue IV antibiotics  Monitor for any elevated WBC or recurrence of fever  PT eval  DC planning    Subjective:  Patient was insistent on removing the Marshall catheter this morning  Has not voided since  Had some hematuria subsequently  Urology input appreciated  We would check postvoid residual and if fails 3 successive attempts then would reinsert Marshall catheter  There is evidence of epididymitis on ultrasound but not clinically per Urology  Lasix till on hold  Will replete potassium  Procalcitonin down to 13 2  Physical Exam:   Vitals: Blood pressure 136/63, pulse 81, temperature 98 5 °F (36 9 °C), temperature source Temporal, resp  rate 20, height 5' 8" (1 727 m), weight 71 7 kg (158 lb 1 1 oz), SpO2 95 %  ,Body mass index is 24 03 kg/m²  Gen:  Pleasant, non-tachypnic, non-dyspnic  Conversant  Heart: regular rate and rhythm, S1S2 present, no murmur, rub or gallop  Lungs: clear to ausculatation bilaterally  No wheezing, crackless, or rhonchi  No accessory muscle use or respiratory distress  Abd: soft, non-tender, non-distended  NABS, no guarding, rebound or peritoneal signs  Extremities: no clubbing, cyanosis or edema  2+pedal pulses bilaterally  Full range of motion  Neuro: awake, alert and oriented  Cranial nerves 2-12 intact  Strength and sensation grossly intact  Skin: warm and dry: no petechiae, purpura and rash  LABS:     Results from last 7 days  Lab Units 01/12/19 0522 01/11/19 0440 01/10/19  0433   WBC Thousand/uL 16 47* 22 63* 23 04*   HEMOGLOBIN g/dL 12 2 12 3 12 1   HEMATOCRIT % 35 2* 35 3* 35 2*   PLATELETS Thousands/uL 115* 117* 120*       Results from last 7 days  Lab Units 01/12/19 0522 01/11/19 0440 01/10/19  0433  01/09/19  0241   POTASSIUM mmol/L 3 0* 4 2 4 9  < >  --    CHLORIDE mmol/L 102 101 105  < >  --    CO2 mmol/L 27 24 27  < >  --    CO2, I-STAT mmol/L  --   --   --   --  23   BUN mg/dL 12 14 14  < >  --    CREATININE mg/dL 0 79 0 81 0 96  < >  --    GLUCOSE, ISTAT mg/dl  --   --   --   --  212*   CALCIUM mg/dL 8 0* 8 6 8 1*  < >  --    < > = values in this interval not displayed      Intake/Output Summary (Last 24 hours) at 01/12/19 1456  Last data filed at 01/12/19 0439   Gross per 24 hour   Intake                0 ml   Output             2550 ml   Net            -2550 ml           Current Facility-Administered Medications:  acetaminophen 650 mg Oral Q6H PRN Vermell Dancer, PA-C    ampicillin 2,000 mg Intravenous Q6H Leonidas Felix MD Last Rate: 2,000 mg (01/12/19 1218)   aspirin 81 mg Oral Daily Rupali Davila MD    atorvastatin 40 mg Oral Daily With Renee Pantoja MD    insulin glargine 10 Units Subcutaneous HS Bella Galdamez MD    insulin lispro 1-5 Units Subcutaneous TID AC Bella Galdamez MD    insulin lispro 1-5 Units Subcutaneous HS Kay Hines MD    isosorbide mononitrate 30 mg Oral Daily Bella Galdamez MD    lactated ringers 50 mL/hr Intravenous Continuous Rupali Davila MD Last Rate: 50 mL/hr (01/11/19 0538)   metoprolol tartrate 25 mg Oral Q6H Rupali Davila MD    nicotine 1 patch Transdermal Daily Vermell Dancer, PA-C    ondansetron 4 mg Intravenous Q4H PRN Marian Belton Spatzer, CRNP    potassium chloride 40 mEq Oral TID With Meals Pablo Phillips MD        Family update- updated daughter

## 2019-01-12 NOTE — PROGRESS NOTES
Progress Note - Electrophysiology-Cardiology (EP)   Jarrett Shownatalie 72 y o  male MRN: [id number]  Unit/Bed#: E5 -01 Encounter: [id number]    Assessment:     1  Severe sepsis with E coli bacteremia, possible uti  2  Elevated troponin-likely type 2  myocardial infarction in the setting of sepsis-peak troponin of 11 5  3  Coronary disease status post coronary bypass grafting and stenting in the past with ejection fraction currently 45% with severe hypokinesis of the basal to mid inferior and basal to mid inferolateral walls with mild to moderate aortic stenosis and mild-to-moderate mitral regurgitation  No active CP  4  Diabetes  5  Hypertension  6  Tobacco use with COPD on imaging  7  Large noncalcified abdominal aortic plaque with areas of ulceration  8  NSVT     Plan:  1  Continue metoprolol  2  contiue aspirn  3  Cont statin  4  Replete hypokalemia  5   Will not resume lasix today    Principal Problem:    SIRS (systemic inflammatory response syndrome) (HCC)  Active Problems:    Acute cystitis without hematuria    Tobacco abuse    Lactic acidosis    Elevated troponin I level    Shortness of breath    Chief Complaint: f/u h/o CAD  Subjective: Denies CP/SOB    Scheduled Meds:  Current Facility-Administered Medications:  acetaminophen 650 mg Oral Q6H PRN Gracy Franco PA-C    ampicillin 2,000 mg Intravenous Q6H Grupo Bright MD Last Rate: 2,000 mg (01/12/19 1218)   aspirin 81 mg Oral Daily Yomaira Marino MD    atorvastatin 40 mg Oral Daily With Perla Novoa MD    insulin glargine 10 Units Subcutaneous HS Óscar Hamilton MD    insulin lispro 1-5 Units Subcutaneous TID Germán Ward MD    insulin lispro 1-5 Units Subcutaneous HS Kay Hines MD    isosorbide mononitrate 30 mg Oral Daily Óscar Hamilton MD    lactated ringers 50 mL/hr Intravenous Continuous Yomaira Marino MD Last Rate: 50 mL/hr (01/11/19 0538)   metoprolol tartrate 25 mg Oral Q6H Yomaira Marino MD    nicotine 1 patch Transdermal Daily Cydnie Styles PA-C    ondansetron 4 mg Intravenous Q4H PRN Virginia Mars Spatzer, CRNP    potassium chloride 40 mEq Oral TID With Meals Sania Styles MD      Continuous Infusions:  lactated ringers 50 mL/hr Last Rate: 50 mL/hr (01/11/19 0538)     PRN Meds:   acetaminophen    ondansetron        ROS: 12 point ROS is done and reviewed,  negative except pertinent positives in CV, above  Vitals: /63 (BP Location: Left arm)   Pulse 81   Temp 98 5 °F (36 9 °C) (Temporal)   Resp 20   Ht 5' 8" (1 727 m)   Wt 71 7 kg (158 lb 1 1 oz)   SpO2 95%   BMI 24 03 kg/m²   Vitals:    01/09/19 1123 01/10/19 0537   Weight: 70 7 kg (155 lb 13 8 oz) 71 7 kg (158 lb 1 1 oz)     Intake/Output Summary (Last 24 hours) at 01/12/19 1300  Last data filed at 01/12/19 0439   Gross per 24 hour   Intake                0 ml   Output             2875 ml   Net            -2875 ml   GEN: Now acute distress, Alert and oriented, well appearing  HEENT:Head, neck, ears, oral pharynx: Mucus membranes moist, oral pharynx clear, nares clear  External ears normal  EYES: Pupils equal, sclera anicteric, midline, normal conjuctiva  NECK: No JVD, supple, no obvious masses or thryomegaly or goiter  CARDIOVASCULAR: RRR, No murmur, rub, gallops S1,S2 sternotomy scar  LUNGS: distant lung sounds, prolonged resp phase Clear To auscultation bilaterally, normal effort, no rales, rhonchi, crackles  ABDOMEN: Soft, nondistended, nontender, without obvious organomegaly or ascites  EXTREMITIES/VASCULAR: No edema  Radial pulses intact, pedal pulses difficult to palpate, warm an well perfused  PSYCH: Normal Affect, no overt suicidal ideation, linear speech pattern without evidence of psychosis  NEURO: Grossly intact, moving all extremiteis equal, face symmetric, alert and responsive, no obvious focal defecits  HEME: No bleeding, bruising, petechia, purpura  SKIN: No significant rashes, warm, no diaphoresis or pallor       Lab Results:   Lab Results: CBC with diff:   Results from last 7 days  Lab Units 01/12/19  0522 01/11/19  0440 01/10/19  0433 01/09/19  0635 01/09/19  0241 01/09/19  0237   WBC Thousand/uL 16 47* 22 63* 23 04* 7 46  --  2 62*   HEMOGLOBIN g/dL 12 2 12 3 12 1 12 6  --  14 3   I STAT HEMOGLOBIN g/dl  --   --   --   --  14 6  --    HEMATOCRIT % 35 2* 35 3* 35 2* 37 6  --  42 0   HEMATOCRIT, ISTAT %  --   --   --   --  43  --    MCV fL 93 93 94 95  --  94   PLATELETS Thousands/uL 115* 117* 120* 144*  --  168   MCH pg 32 2 32 5 32 3 31 8  --  31 9   MCHC g/dL 34 7 34 8 34 4 33 5  --  34 0   RDW % 13 4 13 3 13 2 12 9  --  12 8   MPV fL 9 7 10 2 9 9 9 3  --  8 7*   NRBC AUTO /100 WBCs 0  --  0 0  --  0         CMP:  Results from last 7 days  Lab Units 01/12/19  0522 01/11/19  0440 01/10/19  0433 01/09/19  0635 01/09/19 0241 01/09/19  0237   POTASSIUM mmol/L 3 0* 4 2 4 9 3 1*  --  3 5   CHLORIDE mmol/L 102 101 105 101  --  95*   CO2 mmol/L 27 24 27 16*  --  25   CO2, I-STAT mmol/L  --   --   --   --  23  --    BUN mg/dL 12 14 14 16  --  14   CREATININE mg/dL 0 79 0 81 0 96 1 29  --  1 05   GLUCOSE, ISTAT mg/dl  --   --   --   --  212*  --    CALCIUM mg/dL 8 0* 8 6 8 1* 8 6  --  9 0   AST U/L  --   --   --   --   --  26   ALT U/L  --   --   --   --   --  32   ALK PHOS U/L  --   --   --   --   --  139*   EGFR ml/min/1 73sq m 90 89 79 55 90 71         BMP:  Results from last 7 days  Lab Units 01/12/19  0522 01/11/19  0440 01/10/19  0433 01/09/19  0635 01/09/19  0241 01/09/19  0237   POTASSIUM mmol/L 3 0* 4 2 4 9 3 1*  --  3 5   CHLORIDE mmol/L 102 101 105 101  --  95*   CO2 mmol/L 27 24 27 16*  --  25   CO2, I-STAT mmol/L  --   --   --   --  23  --    BUN mg/dL 12 14 14 16  --  14   CREATININE mg/dL 0 79 0 81 0 96 1 29  --  1 05   GLUCOSE, ISTAT mg/dl  --   --   --   --  212*  --    CALCIUM mg/dL 8 0* 8 6 8 1* 8 6  --  9 0       BNP:   Results Reviewed     Procedure Component Value Units Date/Time    Urine culture [[id number]]  (Abnormal) (Susceptibility) Collected:  01/09/19 0304    Lab Status:  Final result Specimen:  Urine from Urine, Straight Cath Updated:  01/11/19 1229     Urine Culture >100,000 cfu/ml Escherichia coli (A)      >100,000 cfu/ml Alpha Hemolytic Streptococcus NOT Enterococcus (A)    Susceptibility      Escherichia coli     DEBRA    Ampicillin ($$) <8 00 ug/ml Susceptible    Aztreonam ($$$)  <4 ug/ml Susceptible    Cefazolin ($) <2 00 ug/ml Susceptible    Ciprofloxacin ($)  <1 00 ug/ml Susceptible    Gentamicin ($$) 2 ug/ml Susceptible    Levofloxacin ($) <0 25 ug/ml Susceptible    Nitrofurantoin <32 ug/ml Susceptible    Tetracycline <4 ug/ml Susceptible    Tobramycin ($) 2 ug/ml Susceptible    Trimethoprim + Sulfamethoxazole ($$$) <2/38 ug/ml Susceptible    ZID Performed Yes                 Susceptibility      Alpha Hemolytic Streptococcus NOT Enterococcus     DEBRA    ZID Performed Yes                    Blood culture #2 [[id number]]  (Abnormal) Collected:  01/09/19 0237    Lab Status:  Preliminary result Specimen:  Blood from Arm, Right Updated:  01/11/19 1135     Blood Culture Escherichia coli (A)     Gram Stain Result Gram negative rods    Blood culture #1 [[id number]]  (Abnormal)  (Susceptibility) Collected:  01/09/19 0239    Lab Status:  Preliminary result Specimen:  Blood from Arm, Left Updated:  01/11/19 1126     Blood Culture Escherichia coli (A)     Gram Stain Result Gram negative rods    Susceptibility      Escherichia coli     DEBRA (Preliminary)    Ampicillin ($$) <8 00 ug/ml Susceptible    Aztreonam ($$$)  <4 ug/ml Susceptible    Cefazolin ($) <2 00 ug/ml Susceptible    Ciprofloxacin ($)  <1 00 ug/ml Susceptible    Gentamicin ($$) <1 ug/ml Susceptible    Levofloxacin ($) <0 25 ug/ml Susceptible    Tetracycline <4 ug/ml Susceptible    Tobramycin ($) <1 ug/ml Susceptible    Trimethoprim + Sulfamethoxazole ($$$) <2/38 ug/ml Susceptible                   Procalcitonin [[id number]]  (Abnormal) Collected:  01/09/19 0635    Lab Status:  Final result Specimen:  Blood from Hand, Left Updated:  01/09/19 1109     Procalcitonin 42 51 (H) ng/ml     INFLUENZA A/B AND RSV, PCR [[id number]]  (Normal) Collected:  01/09/19 0237    Lab Status:  Final result Specimen:  Nasopharyngeal from Nasopharyngeal Swab Updated:  01/09/19 0925     INFLU A PCR None Detected     INFLU B PCR None Detected     RSV PCR None Detected    Lactic Acid STAT and in 2 hours if first result greater than 2 [[id number]]  (Abnormal) Collected:  01/09/19 0635    Lab Status:  Final result Specimen:  Blood from Hand, Left Updated:  01/09/19 0719     LACTIC ACID 4 8 (HH) mmol/L     Narrative:         Result may be elevated if tourniquet was used during collection  Troponin I [[id number]]  (Abnormal) Collected:  01/09/19 0635    Lab Status:  Final result Specimen:  Blood from Hand, Left Updated:  01/09/19 0717     Troponin I 4 25 (H) ng/mL     Lactic acid, plasma [[id number]]  (Abnormal) Collected:  01/09/19 0415    Lab Status:  Final result Specimen:  Blood from Arm, Right Updated:  01/09/19 0449     LACTIC ACID 4 0 (HH) mmol/L     Narrative:         Result may be elevated if tourniquet was used during collection  Blood gas, arterial [[id number]]  (Abnormal) Collected:  01/09/19 0329    Lab Status:  Final result Specimen:  Blood, Arterial from Brachial, Left Updated:  01/09/19 0339     pH, Arterial 7 441     pCO2, Arterial 29 7 (LL) mm Hg      pO2, Arterial 114 6 mm Hg      HCO3, Arterial 19 8 (L) mmol/L      Base Excess, Arterial -3 2 mmol/L      O2 Content, Arterial 17 4 mL/dL      O2 HGB,Arterial  95 3 %      SOURCE Brachial, Left     ARIELA TEST Yes     Nasal Cannula 3    Lactic acid, plasma [[id number]]  (Abnormal) Collected:  01/09/19 0237    Lab Status:  Final result Specimen:  Blood from Arm, Right Updated:  01/09/19 0331     LACTIC ACID 4 7 (HH) mmol/L     Narrative:         Result may be elevated if tourniquet was used during collection      Rapid Influenza Screen with Reflex PCR [[id number]]  (Normal) Collected:  01/09/19 0237    Lab Status:  Final result Specimen:  Nasopharyngeal from Nasopharyngeal Swab Updated:  01/09/19 0328     Rapid Influenza A Ag Negative     Rapid Influenza B Ag Negative    Urine Microscopic [[id number]]  (Abnormal) Collected:  01/09/19 0304    Lab Status:  Final result Specimen:  Urine from Urine, Straight Cath Updated:  01/09/19 0322     RBC, UA 10-20 (A) /hpf      WBC, UA 30-50 (A) /hpf      Epithelial Cells Occasional /hpf      Bacteria, UA Innumerable (A) /hpf     UA w Reflex to Microscopic w Reflex to Culture [[id number]]  (Abnormal) Collected:  01/09/19 0304    Lab Status:  Final result Specimen:  Urine from Urine, Straight Cath Updated:  01/09/19 0310     Color, UA Yellow     Clarity, UA Cloudy     Specific Gravity, UA 1 025     pH, UA 6 5     Leukocytes, UA Small (A)     Nitrite, UA Negative     Protein, UA 30 (1+) (A) mg/dl      Glucose, UA >=1000 (1%) (A) mg/dl      Ketones, UA Negative mg/dl      Urobilinogen, UA 0 2 E U /dl      Bilirubin, UA Negative     Blood, UA Moderate (A)    B-type natriuretic peptide [[id number]]  (Abnormal) Collected:  01/09/19 0237    Lab Status:  Final result Specimen:  Blood from Arm, Right Updated:  01/09/19 0305     NT-proBNP 2,206 (H) pg/mL     Troponin I [[id number]]  (Abnormal) Collected:  01/09/19 0237    Lab Status:  Final result Specimen:  Blood from Arm, Right Updated:  01/09/19 0303     Troponin I 0 13 (H) ng/mL     Comprehensive metabolic panel [[id number]]  (Abnormal) Collected:  01/09/19 0237    Lab Status:  Final result Specimen:  Blood from Arm, Right Updated:  01/09/19 0259     Sodium 134 (L) mmol/L      Potassium 3 5 mmol/L      Chloride 95 (L) mmol/L      CO2 25 mmol/L      ANION GAP 14 (H) mmol/L      BUN 14 mg/dL      Creatinine 1 05 mg/dL      Glucose 208 (H) mg/dL      Calcium 9 0 mg/dL      AST 26 U/L      ALT 32 U/L      Alkaline Phosphatase 139 (H) U/L      Total Protein 7 4 g/dL      Albumin 3 4 (L) g/dL      Total Bilirubin 0 82 mg/dL      eGFR 71 ml/min/1 73sq m     Narrative:         National Kidney Disease Education Program recommendations are as follows:  GFR calculation is accurate only with a steady state creatinine  Chronic Kidney disease less than 60 ml/min/1 73 sq  meters  Kidney failure less than 15 ml/min/1 73 sq  meters      Protime-INR [[id number]]  (Abnormal) Collected:  01/09/19 0237    Lab Status:  Final result Specimen:  Blood from Arm, Right Updated:  01/09/19 0259     Protime 14 9 (H) seconds      INR 1 16    APTT [[id number]]  (Normal) Collected:  01/09/19 0237    Lab Status:  Final result Specimen:  Blood from Arm, Right Updated:  01/09/19 0259     PTT 31 seconds     POCT Chem 8+ [[id number]]  (Abnormal) Collected:  01/09/19 0241    Lab Status:  Final result Updated:  01/09/19 0246     SODIUM, I-STAT 136 mmol/l      Potassium, i-STAT 3 4 (L) mmol/L      Chloride, istat 96 (L) mmol/L      CO2, i-STAT 23 mmol/L      Anion Gap, i-STAT 21 (H) mmol/L      Calcium, Ionized i-STAT 1 13 mmol/L      BUN, I-STAT 12 mg/dl      Creatinine, i-STAT 0 8 mg/dl      eGFR 90 ml/min/1 73sq m      Glucose, i-STAT 212 (H) mg/dl      Hct, i-STAT 43 %      Hgb, i-STAT 14 6 g/dl      Specimen Type VENOUS    CBC and differential [[id number]]  (Abnormal) Collected:  01/09/19 0237    Lab Status:  Final result Specimen:  Blood from Arm, Right Updated:  01/09/19 0244     WBC 2 62 (L) Thousand/uL      RBC 4 48 Million/uL      Hemoglobin 14 3 g/dL      Hematocrit 42 0 %      MCV 94 fL      MCH 31 9 pg      MCHC 34 0 g/dL      RDW 12 8 %      MPV 8 7 (L) fL      Platelets 179 Thousands/uL      nRBC 0 /100 WBCs      Neutrophils Relative 79 (H) %      Immat GRANS % 0 %      Lymphocytes Relative 18 %      Monocytes Relative 1 (L) %      Eosinophils Relative 2 %      Basophils Relative 0 %      Neutrophils Absolute 2 06 Thousands/µL      Immature Grans Absolute 0 00 Thousand/uL      Lymphocytes Absolute 0 48 (L) Thousands/µL Monocytes Absolute 0 02 (L) Thousand/µL      Eosinophils Absolute 0 05 Thousand/µL      Basophils Absolute 0 01 Thousands/µL         No results for input(s): BNP in the last 72 hours  Results from last 7 days  Lab Units 19  1220 19  0944 19  0635   TROPONIN I ng/mL 10 51* 11 55* 4 25*         Magnesium:       Coags:   Results from last 7 days  Lab Units 19  0237   PTT seconds 31   INR  1 16       TSH:       Lipid Profile:         Cardiac testing:   Results for orders placed during the hospital encounter of 19   Echo complete with contrast if indicated    Narrative [address] [facility]  [address] [facility], [address]  [phone]    Transthoracic Echocardiogram  Limited 2D, M-mode, Doppler, and Color Doppler    Study date:  10-Salvador-2019    Patient: Violette Paiz  MR number: [id number]  Account number: [de-identified]  : 1945  Age: 72 years  Gender: Male  Status: Inpatient  Location: Bedside  Height: 68 in  Weight: 158 lb  BP: 134/ 65 mmHg    Indications: CAD    Diagnoses: I25 10 - Atherosclerotic heart disease of native coronary artery without angina pectoris    Sonographer:  WALDEMAR Armstrong  Referring Physician:  Juli Briones MD  Group:  [facility]  Interpreting Physician:  Juli Briones MD    SUMMARY    LEFT VENTRICLE:  The ventricle was mildly dilated  Systolic function was mildly reduced  Ejection fraction was estimated to be 45 %  There was severe hypokinesis of the basal-mid inferior and basal-mid inferolateral wall(s)  Wall thickness was at the upper limits of normal     LEFT ATRIUM:  The atrium was mildly dilated  MITRAL VALVE:  There was mild to moderate annular calcification  There was mild to moderate regurgitation  AORTIC VALVE:  The valve was trileaflet  Leaflets exhibited moderately increased thickness  There was mild to moderate stenosis  There was mild regurgitation      TRICUSPID VALVE:  There was mild to moderate regurgitation  IVC, HEPATIC VEINS:  The inferior vena cava was moderately dilated  Respirophasic changes were blunted (less than 50% variation)  HISTORY: PRIOR HISTORY: CAD, CABG, DM, HTN, RBBB    PROCEDURE: The procedure was performed at the bedside  This was a routine study  The transthoracic approach was used  The study included limited 2D imaging, M-mode, limited spectral Doppler, and color Doppler  The heart rate was 101 bpm,  at the start of the study  Images were obtained from the parasternal, apical, subcostal, and suprasternal notch acoustic windows  Image quality was adequate  LEFT VENTRICLE: The ventricle was mildly dilated  Systolic function was mildly reduced  Ejection fraction was estimated to be 45 %  There was severe hypokinesis of the basal-mid inferior and basal-mid inferolateral wall(s)  Wall thickness  was at the upper limits of normal  DOPPLER: There was an increased relative contribution of atrial contraction to ventricular filling  The deceleration time of the early transmitral flow velocity was normal     RIGHT VENTRICLE: The size was normal  Systolic function was normal  Wall thickness was normal     LEFT ATRIUM: The atrium was mildly dilated  RIGHT ATRIUM: Size was at the upper limits of normal     MITRAL VALVE: There was mild to moderate annular calcification  DOPPLER: There was no evidence for stenosis  There was mild to moderate regurgitation  AORTIC VALVE: The valve was trileaflet  Leaflets exhibited moderately increased thickness  DOPPLER: There was mild to moderate stenosis  There was mild regurgitation  TRICUSPID VALVE: The valve structure was normal  There was normal leaflet separation  DOPPLER: The transtricuspid velocity was within the normal range  There was no evidence for stenosis  There was mild to moderate regurgitation  Pulmonary  artery systolic pressure was moderately to markedly increased   Estimated peak PA pressure was 53 mmHg     PULMONIC VALVE: Leaflets exhibited normal thickness, no calcification, and normal cuspal separation  DOPPLER: The transpulmonic velocity was within the normal range  There was no regurgitation  PERICARDIUM: There was no pericardial effusion  The pericardium was normal in appearance  AORTA: The root exhibited normal size  SYSTEMIC VEINS: IVC: The inferior vena cava was moderately dilated  Respirophasic changes were blunted (less than 50% variation)  SYSTEM MEASUREMENT TABLES    2D  Ao Diam: 2 9 cm  IVSd: 1 cm  LA Diam: 4 3 cm  LVIDd: 5 4 cm  LVOT Diam: 2 cm  LVPWd: 0 9 cm  SV(Teich): 49 8 ml    CW  AV Env  Ti: 268 3 ms  AV VTI: 36 2 cm  AV Vmax: 2 m/s  AV Vmean: 1 3 m/s  AV maxP mmHg  AV meanP 4 mmHg  TR Vmax: 3 5 m/s  TR maxP 2 mmHg    PW  E': 0 1 m/s  E/E': 11 4  MV A Trell: 1 1 m/s  MV Dec Valencia: 5 3 m/s2  MV DecT: 187 7 ms  MV E Trell: 1 m/s  MV E/A Ratio: 0 9    [employer] Accredited Echocardiography Laboratory    Prepared and electronically signed by    Leonardo Booth MD  Signed 10-Salvador-2019 17:19:16       No results found for this or any previous visit  No results found for this or any previous visit  No results found for this or any previous visit

## 2019-01-13 ENCOUNTER — TELEPHONE (OUTPATIENT)
Dept: UROLOGY | Facility: CLINIC | Age: 74
End: 2019-01-13

## 2019-01-13 LAB
ANION GAP SERPL CALCULATED.3IONS-SCNC: 7 MMOL/L (ref 4–13)
BASOPHILS # BLD AUTO: 0.05 THOUSANDS/ΜL (ref 0–0.1)
BASOPHILS NFR BLD AUTO: 0 % (ref 0–1)
BUN SERPL-MCNC: 12 MG/DL (ref 5–25)
CALCIUM SERPL-MCNC: 7.5 MG/DL (ref 8.3–10.1)
CHLORIDE SERPL-SCNC: 105 MMOL/L (ref 100–108)
CO2 SERPL-SCNC: 24 MMOL/L (ref 21–32)
CREAT SERPL-MCNC: 0.65 MG/DL (ref 0.6–1.3)
EOSINOPHIL # BLD AUTO: 0.17 THOUSAND/ΜL (ref 0–0.61)
EOSINOPHIL NFR BLD AUTO: 1 % (ref 0–6)
ERYTHROCYTE [DISTWIDTH] IN BLOOD BY AUTOMATED COUNT: 13.7 % (ref 11.6–15.1)
GFR SERPL CREATININE-BSD FRML MDRD: 98 ML/MIN/1.73SQ M
GLUCOSE SERPL-MCNC: 145 MG/DL (ref 65–140)
GLUCOSE SERPL-MCNC: 161 MG/DL (ref 65–140)
GLUCOSE SERPL-MCNC: 216 MG/DL (ref 65–140)
GLUCOSE SERPL-MCNC: 240 MG/DL (ref 65–140)
GLUCOSE SERPL-MCNC: 246 MG/DL (ref 65–140)
GLUCOSE SERPL-MCNC: 250 MG/DL (ref 65–140)
HCT VFR BLD AUTO: 35.8 % (ref 36.5–49.3)
HGB BLD-MCNC: 12 G/DL (ref 12–17)
IMM GRANULOCYTES # BLD AUTO: 0.05 THOUSAND/UL (ref 0–0.2)
IMM GRANULOCYTES NFR BLD AUTO: 0 % (ref 0–2)
LYMPHOCYTES # BLD AUTO: 1.47 THOUSANDS/ΜL (ref 0.6–4.47)
LYMPHOCYTES NFR BLD AUTO: 12 % (ref 14–44)
MCH RBC QN AUTO: 31.7 PG (ref 26.8–34.3)
MCHC RBC AUTO-ENTMCNC: 33.5 G/DL (ref 31.4–37.4)
MCV RBC AUTO: 95 FL (ref 82–98)
MONOCYTES # BLD AUTO: 1.68 THOUSAND/ΜL (ref 0.17–1.22)
MONOCYTES NFR BLD AUTO: 14 % (ref 4–12)
NEUTROPHILS # BLD AUTO: 8.49 THOUSANDS/ΜL (ref 1.85–7.62)
NEUTS SEG NFR BLD AUTO: 73 % (ref 43–75)
NRBC BLD AUTO-RTO: 0 /100 WBCS
PLATELET # BLD AUTO: 121 THOUSANDS/UL (ref 149–390)
PMV BLD AUTO: 9.7 FL (ref 8.9–12.7)
POTASSIUM SERPL-SCNC: 3.5 MMOL/L (ref 3.5–5.3)
RBC # BLD AUTO: 3.79 MILLION/UL (ref 3.88–5.62)
SODIUM SERPL-SCNC: 136 MMOL/L (ref 136–145)
TROPONIN I SERPL-MCNC: 0.97 NG/ML
WBC # BLD AUTO: 11.91 THOUSAND/UL (ref 4.31–10.16)

## 2019-01-13 PROCEDURE — 99232 SBSQ HOSP IP/OBS MODERATE 35: CPT | Performed by: HOSPITALIST

## 2019-01-13 PROCEDURE — 80048 BASIC METABOLIC PNL TOTAL CA: CPT | Performed by: HOSPITALIST

## 2019-01-13 PROCEDURE — 82948 REAGENT STRIP/BLOOD GLUCOSE: CPT

## 2019-01-13 PROCEDURE — 85025 COMPLETE CBC W/AUTO DIFF WBC: CPT | Performed by: HOSPITALIST

## 2019-01-13 PROCEDURE — 84484 ASSAY OF TROPONIN QUANT: CPT | Performed by: NURSE PRACTITIONER

## 2019-01-13 PROCEDURE — 94660 CPAP INITIATION&MGMT: CPT

## 2019-01-13 PROCEDURE — 94760 N-INVAS EAR/PLS OXIMETRY 1: CPT

## 2019-01-13 RX ORDER — TAMSULOSIN HYDROCHLORIDE 0.4 MG/1
0.4 CAPSULE ORAL
Status: DISCONTINUED | OUTPATIENT
Start: 2019-01-13 | End: 2019-01-22 | Stop reason: HOSPADM

## 2019-01-13 RX ORDER — FUROSEMIDE 10 MG/ML
40 INJECTION INTRAMUSCULAR; INTRAVENOUS ONCE
Status: COMPLETED | OUTPATIENT
Start: 2019-01-13 | End: 2019-01-13

## 2019-01-13 RX ORDER — LEVALBUTEROL 1.25 MG/.5ML
1.25 SOLUTION, CONCENTRATE RESPIRATORY (INHALATION) EVERY 6 HOURS PRN
Status: DISCONTINUED | OUTPATIENT
Start: 2019-01-13 | End: 2019-01-13

## 2019-01-13 RX ADMIN — FUROSEMIDE 40 MG: 10 INJECTION, SOLUTION INTRAVENOUS at 20:54

## 2019-01-13 RX ADMIN — METOPROLOL TARTRATE 25 MG: 25 TABLET, FILM COATED ORAL at 04:03

## 2019-01-13 RX ADMIN — TAMSULOSIN HYDROCHLORIDE 0.4 MG: 0.4 CAPSULE ORAL at 16:45

## 2019-01-13 RX ADMIN — ASPIRIN 81 MG: 81 TABLET, COATED ORAL at 08:25

## 2019-01-13 RX ADMIN — AMPICILLIN SODIUM 2000 MG: 2 INJECTION, POWDER, FOR SOLUTION INTRAMUSCULAR; INTRAVENOUS at 05:59

## 2019-01-13 RX ADMIN — GUAIFENESIN 200 MG: 100 SOLUTION ORAL at 08:32

## 2019-01-13 RX ADMIN — NITROGLYCERIN 1 INCH: 20 OINTMENT TOPICAL at 20:54

## 2019-01-13 RX ADMIN — MELATONIN TAB 3 MG 6 MG: 3 TAB at 22:17

## 2019-01-13 RX ADMIN — AMPICILLIN SODIUM 2000 MG: 2 INJECTION, POWDER, FOR SOLUTION INTRAMUSCULAR; INTRAVENOUS at 16:49

## 2019-01-13 RX ADMIN — INSULIN GLARGINE 10 UNITS: 100 INJECTION, SOLUTION SUBCUTANEOUS at 22:16

## 2019-01-13 RX ADMIN — POTASSIUM CHLORIDE 40 MEQ: 1500 TABLET, EXTENDED RELEASE ORAL at 08:25

## 2019-01-13 RX ADMIN — AMPICILLIN SODIUM 2000 MG: 2 INJECTION, POWDER, FOR SOLUTION INTRAMUSCULAR; INTRAVENOUS at 12:31

## 2019-01-13 RX ADMIN — METOPROLOL TARTRATE 25 MG: 25 TABLET, FILM COATED ORAL at 22:17

## 2019-01-13 RX ADMIN — METOPROLOL TARTRATE 25 MG: 25 TABLET, FILM COATED ORAL at 16:45

## 2019-01-13 RX ADMIN — ATORVASTATIN CALCIUM 40 MG: 40 TABLET, FILM COATED ORAL at 16:45

## 2019-01-13 RX ADMIN — INSULIN LISPRO 2 UNITS: 100 INJECTION, SOLUTION INTRAVENOUS; SUBCUTANEOUS at 22:16

## 2019-01-13 RX ADMIN — ISOSORBIDE MONONITRATE 30 MG: 30 TABLET, EXTENDED RELEASE ORAL at 08:25

## 2019-01-13 RX ADMIN — INSULIN LISPRO 2 UNITS: 100 INJECTION, SOLUTION INTRAVENOUS; SUBCUTANEOUS at 12:30

## 2019-01-13 RX ADMIN — NICOTINE 1 PATCH: 21 PATCH, EXTENDED RELEASE TRANSDERMAL at 08:26

## 2019-01-13 RX ADMIN — INSULIN LISPRO 2 UNITS: 100 INJECTION, SOLUTION INTRAVENOUS; SUBCUTANEOUS at 16:48

## 2019-01-13 RX ADMIN — METOPROLOL TARTRATE 25 MG: 25 TABLET, FILM COATED ORAL at 08:25

## 2019-01-13 NOTE — PROGRESS NOTES
Following for UTI in urinary retention  Marshall catheter reinserted yesterday  Urine cultures revealed pansensitive E coli  Patient is currently receiving ampicillin  Continue to follow recommendations of infectious disease regarding appropriate antibiotic for outpatient therapy including route of administration and length of treatment  Maintain Marshall catheter to gravity drainage today  If the patient remains an inpatient over the next 24-48 hours consider another voiding trial prior to discharge  Recommend starting tamsulosin 0 4 mg daily in the evening  Follow up in the outpatient setting for void trial of discharge with Marshall catheter

## 2019-01-13 NOTE — PROGRESS NOTES
Progress Note - Sanaz Knight 72 y o  male MRN: [id number]    Unit/Bed#: E5 -01 Encounter: [id number]    Principal Problem:    SIRS (systemic inflammatory response syndrome) (HCC)  Active Problems:    Acute cystitis without hematuria    Tobacco abuse    Lactic acidosis    Elevated troponin I level    Shortness of breath  Resolved Problems:    * No resolved hospital problems  *      Assessment/Plan:  1-severe sepsis on presentation-secondary to 2-patient presented with fever, leukopenia, lactic acidosis with blood cultures growing E coli   Likely urinary source   On IV ampicillin -changed from IV ceftriaxone   Plan to continue 7 days antibiotics through 01/15/2019   X-ray chest suggestive of infiltrate right lower lobe however patient appears stable from a respiratory point of view    Procalcitonin elevated at 90 which is now trended down to 13   Will repeat procalcitonin tomorrow  Shasha Fung continue supportive care and monitoring for any recurrence of temperature or elevation of WBC     2-E coli bacteremia secondary to 3-genitourinary source   Urine culture growing E coli  Morgan Lade  complaining of scrotal pain  ultrasound suggestive of right-sided epididymitis however no evidence of epididymitis per Urology   Id input appreciated   Continue IV ampicillin  See plan above     3-urinary retention on admission status post Marshall placement   CT revealing prostatomegaly  Marshall was discontinued on 01/12/2018 at patient insistence however patient could not void subsequently hence Marshall was reinserted  Could trial another voiding trial of Marshall prior to discharge  Started tamsulosin    Will follow up with Urology as outpatient      4-diabetes mellitus type 2 on insulin   Patient on Lantus 40 units in the afternoon--this has been changed to a sliding scale coverage   Will add Lantus 10 units at night   Will check glycosylated hemoglobin     5-elevated troponin-likely non ST elevation type 2 secondary to sepsis   Continue on metoprolol and aspirin   No chest pain currently   Patient was also on imdur at home      6-coronary artery disease status post stent and CABG   Was on aspirin and Plavix at home      7-essential hypertension   Was on lisinopril 10 mg daily at home   Blood pressure currently low normal hence will hold off of lisinopril      8-coronary artery disease status post CABG with EF 45% with hypokinesis of the basal to mid inferior lead atrial walls  --appears stable at present        Continue IV antibiotics through 01/15/2019  Monitor for any elevated WBC or recurrence of fever  PT eval  DC planning      Subjective: Marshall placed back yesterday after patient could not void following discontinuation of Marshall at patient insistence  Urine culture growing E coli  On ampicillin to be continued till the 15th of this month  Clinically no evidence of epididymitis per Urology  Flomax started  Physical Exam:   Vitals: Blood pressure 143/75, pulse 85, temperature 98 2 °F (36 8 °C), temperature source Temporal, resp  rate 18, height 5' 8" (1 727 m), weight 71 7 kg (158 lb 1 1 oz), SpO2 93 %  ,Body mass index is 24 03 kg/m²  Gen:   non-tachypnic, non-dyspnic  Conversant  Heart: regular rate and rhythm, S1S2 present, no murmur, rub or gallop  Lungs: clear to ausculatation bilaterally  No wheezing, crackless, or rhonchi  No accessory muscle use or respiratory distress  Abd: soft, non-tender, non-distended  NABS, no guarding, rebound or peritoneal signs  Extremities: no clubbing, cyanosis or edema  2+pedal pulses bilaterally  Full range of motion  Neuro: awake, alert and oriented  Cranial nerves 2-12 intact  Strength and sensation grossly intact  Skin: warm and dry: no petechiae, purpura and rash      LABS:     Results from last 7 days  Lab Units 01/13/19  0558 01/12/19  0522 01/11/19  0440   WBC Thousand/uL 11 91* 16 47* 22 63*   HEMOGLOBIN g/dL 12 0 12 2 12 3   HEMATOCRIT % 35 8* 35 2* 35 3* PLATELETS Thousands/uL 121* 115* 117*       Results from last 7 days  Lab Units 01/13/19  0558 01/12/19  0522 01/11/19  0440  01/09/19  0241   POTASSIUM mmol/L 3 5 3 0* 4 2  < >  --    CHLORIDE mmol/L 105 102 101  < >  --    CO2 mmol/L 24 27 24  < >  --    CO2, I-STAT mmol/L  --   --   --   --  23   BUN mg/dL 12 12 14  < >  --    CREATININE mg/dL 0 65 0 79 0 81  < >  --    GLUCOSE, ISTAT mg/dl  --   --   --   --  212*   CALCIUM mg/dL 7 5* 8 0* 8 6  < >  --    < > = values in this interval not displayed      Intake/Output Summary (Last 24 hours) at 01/13/19 1301  Last data filed at 01/13/19 0713   Gross per 24 hour   Intake              240 ml   Output              860 ml   Net             -620 ml           Current Facility-Administered Medications:  acetaminophen 650 mg Oral Q6H PRN Magalie Hubbard PA-C    ampicillin 2,000 mg Intravenous Q6H Tito Dawn MD Last Rate: 2,000 mg (01/13/19 1231)   aspirin 81 mg Oral Daily Denita Baker MD    atorvastatin 40 mg Oral Daily With Maricruz Fletcher MD    guaiFENesin 200 mg Oral Q4H PRN VARGAS Kidd    insulin glargine 10 Units Subcutaneous HS Brannon Sousa MD    insulin lispro 1-5 Units Subcutaneous TID AC Brannon Sousa MD    insulin lispro 1-5 Units Subcutaneous HS Kay Hines MD    isosorbide mononitrate 30 mg Oral Daily Brannon Sousa MD    lactated ringers 50 mL/hr Intravenous Continuous Denita Baker MD Last Rate: 50 mL/hr (01/11/19 0538)   melatonin 6 mg Oral HS VARGAS Kidd    metoprolol tartrate 25 mg Oral Q6H Denita Baker MD    nicotine 1 patch Transdermal Daily Magalie Hubbard PA-C    ondansetron 4 mg Intravenous Q4H PRN Lara Neither Spatzer, CRNP    tamsulosin 0 4 mg Oral Daily With Ethan Phan MD        Family update- updated daughter yesterday

## 2019-01-14 LAB
ANION GAP SERPL CALCULATED.3IONS-SCNC: 8 MMOL/L (ref 4–13)
BACTERIA BLD CULT: ABNORMAL
BASOPHILS # BLD AUTO: 0.04 THOUSANDS/ΜL (ref 0–0.1)
BASOPHILS NFR BLD AUTO: 0 % (ref 0–1)
BUN SERPL-MCNC: 11 MG/DL (ref 5–25)
CALCIUM SERPL-MCNC: 8 MG/DL (ref 8.3–10.1)
CHLORIDE SERPL-SCNC: 101 MMOL/L (ref 100–108)
CO2 SERPL-SCNC: 28 MMOL/L (ref 21–32)
CREAT SERPL-MCNC: 0.74 MG/DL (ref 0.6–1.3)
EOSINOPHIL # BLD AUTO: 0.03 THOUSAND/ΜL (ref 0–0.61)
EOSINOPHIL NFR BLD AUTO: 0 % (ref 0–6)
ERYTHROCYTE [DISTWIDTH] IN BLOOD BY AUTOMATED COUNT: 13.7 % (ref 11.6–15.1)
EST. AVERAGE GLUCOSE BLD GHB EST-MCNC: 169 MG/DL
GFR SERPL CREATININE-BSD FRML MDRD: 93 ML/MIN/1.73SQ M
GLUCOSE SERPL-MCNC: 177 MG/DL (ref 65–140)
GLUCOSE SERPL-MCNC: 206 MG/DL (ref 65–140)
GLUCOSE SERPL-MCNC: 242 MG/DL (ref 65–140)
GLUCOSE SERPL-MCNC: 258 MG/DL (ref 65–140)
GRAM STN SPEC: ABNORMAL
HBA1C MFR BLD: 7.5 % (ref 4.2–6.3)
HCT VFR BLD AUTO: 35.6 % (ref 36.5–49.3)
HGB BLD-MCNC: 12 G/DL (ref 12–17)
IMM GRANULOCYTES # BLD AUTO: 0.06 THOUSAND/UL (ref 0–0.2)
IMM GRANULOCYTES NFR BLD AUTO: 1 % (ref 0–2)
LYMPHOCYTES # BLD AUTO: 1.67 THOUSANDS/ΜL (ref 0.6–4.47)
LYMPHOCYTES NFR BLD AUTO: 14 % (ref 14–44)
MCH RBC QN AUTO: 31.6 PG (ref 26.8–34.3)
MCHC RBC AUTO-ENTMCNC: 33.7 G/DL (ref 31.4–37.4)
MCV RBC AUTO: 94 FL (ref 82–98)
MONOCYTES # BLD AUTO: 2.18 THOUSAND/ΜL (ref 0.17–1.22)
MONOCYTES NFR BLD AUTO: 18 % (ref 4–12)
NEUTROPHILS # BLD AUTO: 8.31 THOUSANDS/ΜL (ref 1.85–7.62)
NEUTS SEG NFR BLD AUTO: 67 % (ref 43–75)
NRBC BLD AUTO-RTO: 0 /100 WBCS
PLATELET # BLD AUTO: 170 THOUSANDS/UL (ref 149–390)
PMV BLD AUTO: 10 FL (ref 8.9–12.7)
POTASSIUM SERPL-SCNC: 3.8 MMOL/L (ref 3.5–5.3)
PROCALCITONIN SERPL-MCNC: 4.31 NG/ML
RBC # BLD AUTO: 3.8 MILLION/UL (ref 3.88–5.62)
SODIUM SERPL-SCNC: 137 MMOL/L (ref 136–145)
TROPONIN I SERPL-MCNC: 0.92 NG/ML
TROPONIN I SERPL-MCNC: 0.98 NG/ML
WBC # BLD AUTO: 12.29 THOUSAND/UL (ref 4.31–10.16)

## 2019-01-14 PROCEDURE — 82948 REAGENT STRIP/BLOOD GLUCOSE: CPT

## 2019-01-14 PROCEDURE — 80048 BASIC METABOLIC PNL TOTAL CA: CPT | Performed by: HOSPITALIST

## 2019-01-14 PROCEDURE — 94660 CPAP INITIATION&MGMT: CPT

## 2019-01-14 PROCEDURE — 87040 BLOOD CULTURE FOR BACTERIA: CPT | Performed by: INTERNAL MEDICINE

## 2019-01-14 PROCEDURE — 84145 PROCALCITONIN (PCT): CPT | Performed by: HOSPITALIST

## 2019-01-14 PROCEDURE — 85025 COMPLETE CBC W/AUTO DIFF WBC: CPT | Performed by: HOSPITALIST

## 2019-01-14 PROCEDURE — 83036 HEMOGLOBIN GLYCOSYLATED A1C: CPT | Performed by: HOSPITALIST

## 2019-01-14 PROCEDURE — 99232 SBSQ HOSP IP/OBS MODERATE 35: CPT | Performed by: HOSPITALIST

## 2019-01-14 PROCEDURE — 84484 ASSAY OF TROPONIN QUANT: CPT | Performed by: NURSE PRACTITIONER

## 2019-01-14 PROCEDURE — 99232 SBSQ HOSP IP/OBS MODERATE 35: CPT | Performed by: INTERNAL MEDICINE

## 2019-01-14 RX ORDER — ATORVASTATIN CALCIUM 80 MG/1
80 TABLET, FILM COATED ORAL
Status: DISCONTINUED | OUTPATIENT
Start: 2019-01-14 | End: 2019-01-22 | Stop reason: HOSPADM

## 2019-01-14 RX ORDER — RANOLAZINE 500 MG/1
500 TABLET, EXTENDED RELEASE ORAL EVERY 12 HOURS SCHEDULED
Status: DISCONTINUED | OUTPATIENT
Start: 2019-01-14 | End: 2019-01-22 | Stop reason: HOSPADM

## 2019-01-14 RX ORDER — LISINOPRIL 10 MG/1
10 TABLET ORAL DAILY
Status: DISCONTINUED | OUTPATIENT
Start: 2019-01-14 | End: 2019-01-22 | Stop reason: HOSPADM

## 2019-01-14 RX ADMIN — METOPROLOL TARTRATE 25 MG: 25 TABLET, FILM COATED ORAL at 04:10

## 2019-01-14 RX ADMIN — ASPIRIN 81 MG: 81 TABLET, COATED ORAL at 08:01

## 2019-01-14 RX ADMIN — RANOLAZINE 500 MG: 500 TABLET, FILM COATED, EXTENDED RELEASE ORAL at 12:53

## 2019-01-14 RX ADMIN — INSULIN LISPRO 2 UNITS: 100 INJECTION, SOLUTION INTRAVENOUS; SUBCUTANEOUS at 21:39

## 2019-01-14 RX ADMIN — INSULIN LISPRO 1 UNITS: 100 INJECTION, SOLUTION INTRAVENOUS; SUBCUTANEOUS at 07:58

## 2019-01-14 RX ADMIN — TAMSULOSIN HYDROCHLORIDE 0.4 MG: 0.4 CAPSULE ORAL at 17:58

## 2019-01-14 RX ADMIN — MELATONIN TAB 3 MG 6 MG: 3 TAB at 21:38

## 2019-01-14 RX ADMIN — LISINOPRIL 10 MG: 10 TABLET ORAL at 12:57

## 2019-01-14 RX ADMIN — ATORVASTATIN CALCIUM 80 MG: 80 TABLET, FILM COATED ORAL at 17:58

## 2019-01-14 RX ADMIN — METOPROLOL TARTRATE 25 MG: 25 TABLET, FILM COATED ORAL at 08:01

## 2019-01-14 RX ADMIN — INSULIN LISPRO 2 UNITS: 100 INJECTION, SOLUTION INTRAVENOUS; SUBCUTANEOUS at 12:55

## 2019-01-14 RX ADMIN — AMPICILLIN SODIUM 2000 MG: 2 INJECTION, POWDER, FOR SOLUTION INTRAMUSCULAR; INTRAVENOUS at 12:54

## 2019-01-14 RX ADMIN — AMPICILLIN SODIUM 2000 MG: 2 INJECTION, POWDER, FOR SOLUTION INTRAMUSCULAR; INTRAVENOUS at 17:58

## 2019-01-14 RX ADMIN — ISOSORBIDE MONONITRATE 30 MG: 30 TABLET, EXTENDED RELEASE ORAL at 08:01

## 2019-01-14 RX ADMIN — AMPICILLIN SODIUM 2000 MG: 2 INJECTION, POWDER, FOR SOLUTION INTRAMUSCULAR; INTRAVENOUS at 00:45

## 2019-01-14 RX ADMIN — AMPICILLIN SODIUM 2000 MG: 2 INJECTION, POWDER, FOR SOLUTION INTRAMUSCULAR; INTRAVENOUS at 05:21

## 2019-01-14 RX ADMIN — INSULIN LISPRO 2 UNITS: 100 INJECTION, SOLUTION INTRAVENOUS; SUBCUTANEOUS at 17:58

## 2019-01-14 RX ADMIN — NICOTINE 1 PATCH: 21 PATCH, EXTENDED RELEASE TRANSDERMAL at 08:00

## 2019-01-14 RX ADMIN — AMPICILLIN SODIUM 2000 MG: 2 INJECTION, POWDER, FOR SOLUTION INTRAMUSCULAR; INTRAVENOUS at 23:43

## 2019-01-14 RX ADMIN — INSULIN GLARGINE 10 UNITS: 100 INJECTION, SOLUTION SUBCUTANEOUS at 21:38

## 2019-01-14 RX ADMIN — RANOLAZINE 500 MG: 500 TABLET, FILM COATED, EXTENDED RELEASE ORAL at 21:38

## 2019-01-14 NOTE — TELEPHONE ENCOUNTER
Patient currently inpatient at [facility] [place] - CLOSED  Will await discharge and schedule void trial if needed per Dr Michelle Whitfield once discharged

## 2019-01-14 NOTE — ASSESSMENT & PLAN NOTE
With bacteremia  Due to Klebsiella, likely from urinary source  Appreciate ID help  On IV ampicillin through tomorrow

## 2019-01-14 NOTE — PROGRESS NOTES
Progress Note - Francisca Huang 1945, 72 y o  male MRN: [id number]    Unit/Bed#: E5 -01 Encounter: [id number]    Primary Care Provider: No primary care provider on file  Date and time admitted to hospital: 2019  2:30 AM        * SIRS (systemic inflammatory response syndrome) ([facility] [address] )   Assessment & Plan    With bacteremia  Due to Klebsiella, likely from urinary source  Appreciate ID help  On IV ampicillin through tomorrow     Acute cystitis without hematuria   Assessment & Plan    Likely caused the Klebsiella bacteremia  Planning on IV Ampiciilin through tomorrow           Subjective:   Feels very weak  No fever or chills  Was sob last night and was placed on BIPAP, but he feels better now  Objective:     Vitals:   Temp (24hrs), Av °F (36 7 °C), Min:97 8 °F (36 6 °C), Max:98 2 °F (36 8 °C)    Temp:  [97 8 °F (36 6 °C)-98 2 °F (36 8 °C)] 97 8 °F (36 6 °C)  HR:  [] 72  Resp:  [18-20] 20  BP: (120-182)/(65-93) 120/65  SpO2:  [94 %-99 %] 99 %  Body mass index is 24 03 kg/m²  Input and Output Summary (last 24 hours): Intake/Output Summary (Last 24 hours) at 19 1403  Last data filed at 19 1122   Gross per 24 hour   Intake                0 ml   Output             2925 ml   Net            -2925 ml       Physical Exam:     Physical Exam   HENT:   Head: Normocephalic and atraumatic  Eyes: Pupils are equal, round, and reactive to light  EOM are normal    Cardiovascular: Normal rate and regular rhythm  Exam reveals no gallop and no friction rub  No murmur heard  Pulmonary/Chest: Effort normal and breath sounds normal  He has no wheezes  He has no rales  Abdominal: Soft  Bowel sounds are normal  There is no tenderness  Musculoskeletal: He exhibits no edema  Nursing note and vitals reviewed              Additional Data:     Labs:      Results from last 7 days  Lab Units 19  0503   WBC Thousand/uL 12 29*   HEMOGLOBIN g/dL 12 0   HEMATOCRIT % 35 6*   PLATELETS Thousands/uL 170   NEUTROS PCT % 67   LYMPHS PCT % 14   MONOS PCT % 18*   EOS PCT % 0       Results from last 7 days  Lab Units 01/14/19  0503  01/09/19  0241 01/09/19  0237   POTASSIUM mmol/L 3 8  < >  --  3 5   CHLORIDE mmol/L 101  < >  --  95*   CO2 mmol/L 28  < >  --  25   CO2, I-STAT mmol/L  --   --  23  --    BUN mg/dL 11  < >  --  14   CREATININE mg/dL 0 74  < >  --  1 05   CALCIUM mg/dL 8 0*  < >  --  9 0   ALK PHOS U/L  --   --   --  139*   ALT U/L  --   --   --  32   AST U/L  --   --   --  26   GLUCOSE, ISTAT mg/dl  --   --  212*  --    < > = values in this interval not displayed      Results from last 7 days  Lab Units 01/09/19  0237   INR  1 16       Results from last 7 days  Lab Units 01/14/19  1236 01/14/19  0747 01/13/19  2216 01/13/19  2027 01/13/19  1557 01/13/19  1110 01/13/19  0715 01/12/19  2114 01/12/19  1710 01/12/19  1127 01/12/19  0702 01/11/19  2045   POC GLUCOSE mg/dl 242* 177* 246* 216* 250* 240* 145* 230* 191* 221* 158* 199*       Results from last 7 days  Lab Units 01/14/19  0503   HEMOGLOBIN A1C % 7 5*           * I Have Reviewed All Lab Data     Recent Cultures (last 7 days):       Results from last 7 days  Lab Units 01/09/19  0304 01/09/19  0239 01/09/19  0237   BLOOD CULTURE   --  Escherichia coli*  Actinomyces species* Escherichia coli*  Actinomyces species*  Streptococcus anginosus*   GRAM STAIN RESULT   --  Gram negative rods  Gram positive rods Gram negative rods  Gram positive cocci in chains  Gram positive rods   URINE CULTURE  >100,000 cfu/ml Escherichia coli*  >100,000 cfu/ml Alpha Hemolytic Streptococcus NOT Enterococcus*  --   --    INFLUENZA B PCR   --   --  None Detected   RSV PCR   --   --  None Detected         Last 24 Hours Medication List:     Current Facility-Administered Medications:  acetaminophen 650 mg Oral Q6H PRN Jeraline , PA-C    ampicillin 2,000 mg Intravenous Q6H Tito Dawn MD Last Rate: 2,000 mg (01/14/19 1254)   aspirin 81 mg Oral Daily Fer Bills Martita Steel MD    atorvastatin 80 mg Oral Daily With Sandra Sheldon MD    guaiFENesin 200 mg Oral Q4H PRN Nelwyn Dy, CRNP    insulin glargine 10 Units Subcutaneous HS John Busby MD    insulin lispro 1-5 Units Subcutaneous TID AC Kay Hines MD    insulin lispro 1-5 Units Subcutaneous HS John Busby MD    lisinopril 10 mg Oral Daily Kevan Leventhal, MD    melatonin 6 mg Oral HS Nelwyn Dy, CRNP    metoprolol succinate 75 mg Oral Daily Kevan Leventhal, MD    nicotine 1 patch Transdermal Daily Juliana Zuniga PA-C    ondansetron 4 mg Intravenous Q4H PRN Colonel Robinsons Spatzer, CRNP    ranolazine 500 mg Oral Q12H Karina Carrion MD    tamsulosin 0 4 mg Oral Daily With Lyndsey Elise MD          VTE Pharmacologic Prophylaxis:   Pharmacologic: df      Current Length of Stay: 5 day(s)    Current Patient Status: Inpatient       Discharge Plan: will ask PT to see, he may need STR    Code Status: Level 1 - Full Code           Today, Patient Was Seen By: Lety Avery DO    ** Please Note: Dictation voice to text software may have been used in the creation of this document   **

## 2019-01-14 NOTE — PROGRESS NOTES
Pt continued to report shortness of breath even after increasing NC to 4L, appeared diaphoretic with labored breathing at this time  Blood sugar taken, 216  Oxygen saturation initially in mid 80s  Respiratory therapist and supervisor called to bedside  [address]  Placed on bipap  Blood pressure 182/80, , 94% on bipap  Pt reporting chest tightness/pain  CRNP gave orders for lasix 40mg IV, EKG, nitropaste, troponin draw

## 2019-01-14 NOTE — PROGRESS NOTES
Reported SOB  Pulse oxygenation 95%  Given NC 2L for comfort  Pt not in any apparent distress  Educated to keep [facility] elevated, deep breathe, given incentive spirometer and instructed how to use it with return demonstration    Will continue to monitor

## 2019-01-14 NOTE — PROGRESS NOTES
Called to see patient for SOB, Diaphoresis, Tachycardia and hypertension  The patient also now complains of some chest tightness    On Exam, mildly diaphoretic  CV Mild tachycardia but reg  Pulm Coarse throughout with crackles bilaterally    A/P  1   Acute respiratory failure likely related to plumonary edema  · Placed on biapap with improvement of symptoms  · Will give 40 mg lasix now  · Will place an inch of nitropaste  · EKG done, RBBB no acute ST segment changes  · Will check trop and place on tele,  · Ok to stay on [id number] for now

## 2019-01-14 NOTE — PROGRESS NOTES
Progress Note - Infectious Disease   Rachel Rosado 72 y o  male MRN: [id number]  Unit/Bed#: E5 -01 Encounter: [id number]      Impression/Recommendations:  1   Severe sepsis   POA:  Fever, leukopenia, and lactic acidosis   Due to # 2/3   Sample with evolving infiltrate but no clinical signs of pneumonia  CT A/P negative   Hemodynamically stable and nontoxic   Clinically improving  Procalcitonin 91 49 > 25 82 > 13 42  Rec:  ? Continue antibiotics as below  ? Follow temperatures closely  ? Supportive care as per the primary service     2   Polymicrobial bacteremia   E  Coli/Actionomyces/Strep anginosis  Due to # 3  CT A/P otherwise negative  TTE negative  Needs repeat blood cultures  Rec:  ? Continue ampicillin for 14 days total of IV antibiotics through 19  ? Check repeat blood cultures     3   Polymicrobial UTI/epididymitis   With pyuria and urinary retention  E  Coli/Alpha Strep   Rec:  ? Continue antibiotics as above     4   Urinary retention   Consider BPH   Status post Marshall  Rec:  ? Continue Marshall catheter per urology  ? Follow urine output closely     5   DM with hyperglycemia   On insulin   Probable risk factor for above infection  Rec:  ? Continue management as per the primary service     Antibiotics:  Ampicillin # 4  Antibiotics #6    Subjective:  Patient seen on AM rounds  Denies fevers, chills, sweats, nausea, vomiting, or diarrhea  Scrotal pain improving  24 Hour Events:  Had SOB overnight thought due to edema  Responded to lasix and BiPAP      Objective:  Vitals:  Temp:  [97 8 °F (36 6 °C)-98 2 °F (36 8 °C)] 97 8 °F (36 6 °C)  HR:  [] 72  Resp:  [18-20] 20  BP: (133-182)/(67-93) 133/67  SpO2:  [94 %-99 %] 99 %  Temp (24hrs), Av °F (36 7 °C), Min:97 8 °F (36 6 °C), Max:98 2 °F (36 8 °C)  Current: Temperature: 97 8 °F (36 6 °C)    Physical Exam:   General:  No acute distress  Psychiatric:  Awake and alert  Pulmonary:  Normal respiratory excursion without accessory muscle use  Abdomen:  Soft, nontender  Extremities:  No edema  Skin:  No rashes  :  Decreased scrotal swelling  Mild crusted blood around penile meatus  Lab Results:  I have personally reviewed pertinent labs  Results from last 7 days  Lab Units 01/14/19  0503 01/13/19  0558 01/12/19  0522  01/09/19  0241 01/09/19  0237   POTASSIUM mmol/L 3 8 3 5 3 0*  < >  --  3 5   CHLORIDE mmol/L 101 105 102  < >  --  95*   CO2 mmol/L 28 24 27  < >  --  25   CO2, I-STAT mmol/L  --   --   --   --  23  --    BUN mg/dL 11 12 12  < >  --  14   CREATININE mg/dL 0 74 0 65 0 79  < >  --  1 05   EGFR ml/min/1 73sq m 93 98 90  < > 90 71   GLUCOSE, ISTAT mg/dl  --   --   --   --  212*  --    CALCIUM mg/dL 8 0* 7 5* 8 0*  < >  --  9 0   AST U/L  --   --   --   --   --  26   ALT U/L  --   --   --   --   --  32   ALK PHOS U/L  --   --   --   --   --  139*   < > = values in this interval not displayed  Results from last 7 days  Lab Units 01/14/19  0503 01/13/19  0558 01/12/19  0522   WBC Thousand/uL 12 29* 11 91* 16 47*   HEMOGLOBIN g/dL 12 0 12 0 12 2   PLATELETS Thousands/uL 170 121* 115*       Results from last 7 days  Lab Units 01/09/19  0304 01/09/19  0239 01/09/19  0237   BLOOD CULTURE   --  Escherichia coli*  Actinomyces species* Escherichia coli*  Actinomyces species*  Streptococcus anginosus*   GRAM STAIN RESULT   --  Gram negative rods  Gram positive rods Gram negative rods  Gram positive cocci in chains  Gram positive rods   URINE CULTURE  >100,000 cfu/ml Escherichia coli*  >100,000 cfu/ml Alpha Hemolytic Streptococcus NOT Enterococcus*  --   --    INFLUENZA B PCR   --   --  None Detected   RSV PCR   --   --  None Detected       Imaging Studies:   I have personally reviewed pertinent imaging study reports and images in PACS  Scrotal U/S left epididymitis    EKG, Pathology, and Other Studies:   I have personally reviewed pertinent reports

## 2019-01-14 NOTE — PROGRESS NOTES
Pt reports feeling "much better" and requested to remove bipap  Breathing less labored, oxygen saturation stable   >95%  Bipap removed and NC 3L placed on patient  900mL clear yellow urine output subsequent to lasix administration  Troponin elevated 0 96  Sandy Gamino made aware of all of the aforementioned information, gave order to trend troponins

## 2019-01-14 NOTE — UTILIZATION REVIEW
Continued Stay Review    Date:    1/13/2019    Vital Signs: /67 (BP Location: Right arm)   Pulse 72   Temp 97 8 °F (36 6 °C) (Temporal)   Resp 20   Ht 5' 8" (1 727 m)   Wt 71 7 kg (158 lb 1 1 oz)   SpO2 99%   BMI 24 03 kg/m²      Assessment/Plan: severe sepsis on presentation-secondary to 2-patient presented with fever, leukopenia, lactic acidosis with blood cultures growing E coli   Likely urinary source   On IV ampicillin -changed from IV ceftriaxone   Plan to continue 7 days antibiotics through 01/15/2019   X-ray chest suggestive of infiltrate right lower lobe however patient appears stable from a respiratory point of view    Procalcitonin elevated at 90 which is now trended down to 13   Will repeat procalcitonin tomorrow  Smith Singleton continue supportive care and monitoring for any recurrence of temperature or elevation of WBC     2-E coli bacteremia secondary to 3-genitourinary source   Urine culture growing E coli  Lyndsey Webb  complaining of scrotal pain  ultrasound suggestive of right-sided epididymitis however no evidence of epididymitis per Urology   Id input appreciated   Continue IV ampicillin  See plan above     3-urinary retention on admission status post Marshall placement   CT revealing prostatomegaly   Marshall was discontinued on 01/12/2018 at patient insistence however patient could not void subsequently hence Marshall was reinserted  Could trial another voiding trial of Marshall prior to discharge  Started tamsulosin    Will follow up with Urology as outpatient      4-diabetes mellitus type 2 on insulin   Patient on Lantus 40 units in the afternoon--this has been changed to a sliding scale coverage   Will add Lantus 10 units at night   Will check glycosylated hemoglobin     5-elevated troponin-likely non ST elevation type 2 secondary to sepsis   Continue on metoprolol and aspirin   No chest pain currently   Patient was also on imdur at home      6-coronary artery disease status post stent and CABG   Was on aspirin and Plavix at home      7-essential hypertension   Was on lisinopril 10 mg daily at home   Blood pressure currently low normal hence will hold off of lisinopril   -coronary artery disease status post CABG with EF 45% with hypokinesis of the basal to mid inferior lead atrial walls  --appears stable at present         Continue IV antibiotics through 01/15/2019  Monitor for any elevated WBC or recurrence of fever  PT eval    Medications:   Scheduled Meds:   Current Facility-Administered Medications:  acetaminophen 650 mg Oral Q6H PRN Keshia Sanon PA-C    ampicillin 2,000 mg Intravenous Q6H Gail Corrales MD Last Rate: 2,000 mg (01/14/19 0521)   aspirin 81 mg Oral Daily Patti Mueller MD    atorvastatin 80 mg Oral Daily With Kellie Espino MD    guaiFENesin 200 mg Oral Q4H PRN VARGAS Downey    insulin glargine 10 Units Subcutaneous HS Ena Lake MD    insulin lispro 1-5 Units Subcutaneous TID AC Kay Hines MD    insulin lispro 1-5 Units Subcutaneous HS Ena Lake MD    lisinopril 10 mg Oral Daily Patti Mueller MD    melatonin 6 mg Oral HS VARGAS Downey    metoprolol succinate 75 mg Oral Daily Patti Mueller MD    nicotine 1 patch Transdermal Daily Keshia Sanon PA-C    ondansetron 4 mg Intravenous Q4H PRN Ladon Hasty Spatzer, CRNP    ranolazine 500 mg Oral Q12H [address] Patti Mueller MD    tamsulosin 0 4 mg Oral Daily With Glenroy Moscoso MD      Continuous Infusions:    PRN Meds:   acetaminophen    guaiFENesin    ondansetron     Pertinent Labs/Diagnostic Results:    WBC   11 91  Platelets  771    Discharge Plan:    home

## 2019-01-14 NOTE — SOCIAL WORK
CM met with pt at bedside  He lives in a two story home with 10 steps to the basement and 10 steps to the second floor  Pt's brother lives with him  Pt uses cane to ambulate when outside  Indp in ADLs  Hx of VNA about 10 years ago, following heart attack  Pharm is [facility]   PCP is Dr Ankita Mejia  Pt denied any MH hx  Reported that Yara Rowell, his daughter, "takes care of all of that" when asked if he had a POA  Pt drives self  Pt is not listed as having any medical insurance  He reported that he has Medicare and a secondary insurance, but does not have his wallet with him  He stated if we can't find it, his daughter would know  CM notified Vera Lucas, financial counselor, about pt insurance  No CM dc concerns/needs at this time, but CM dept will follow to dc

## 2019-01-14 NOTE — PROGRESS NOTES
Progress Note - Araceli Bishop 72 y o  male MRN: [id number]    Unit/Bed#: E5 -01 Encounter: [id number]  Subjective:   No chest pain or SOB  Gaining strength  No edema  No lightheadedness  Objective:   Vitals: Blood pressure 133/67, pulse 72, temperature 97 8 °F (36 6 °C), temperature source Temporal, resp  rate 20, height 5' 8" (1 727 m), weight 71 7 kg (158 lb 1 1 oz), SpO2 99 %  ,Body mass index is 24 03 kg/m²  CBC with diff:   Results from last 7 days  Lab Units 01/14/19  0503   WBC Thousand/uL 12 29*   RBC Million/uL 3 80*   HEMOGLOBIN g/dL 12 0   HEMATOCRIT % 35 6*   MCV fL 94   MCH pg 31 6   MCHC g/dL 33 7   RDW % 13 7   MPV fL 10 0   PLATELETS Thousands/uL 170     CMP:   Results from last 7 days  Lab Units 01/14/19  0503  01/09/19  0241 01/09/19  0237   SODIUM mmol/L 137  < >  --  134*   POTASSIUM mmol/L 3 8  < >  --  3 5   CHLORIDE mmol/L 101  < >  --  95*   CO2 mmol/L 28  < >  --  25   CO2, I-STAT mmol/L  --   --  23  --    BUN mg/dL 11  < >  --  14   CREATININE mg/dL 0 74  < >  --  1 05   GLUCOSE, ISTAT mg/dl  --   --  212*  --    CALCIUM mg/dL 8 0*  < >  --  9 0   AST U/L  --   --   --  26   ALT U/L  --   --   --  32   ALK PHOS U/L  --   --   --  139*   EGFR ml/min/1 73sq m 93  < > 90 71   < > = values in this interval not displayed  Physical exam:  Lungs-decreased breath sounds with some expiratory wheezing  Heart-regular with grade 1-2 systolic murmur at the base  Abdomen-nontender without mass organomegaly  Extremities-no edema  Pulses absent         Assessment:  1  Urosepsis  Improved  Management as per Infectious Disease and Internal Medicine  2  CAD status post coronary artery bypass grafting with left internal mammary artery to left anterior descending artery graft and saphenous vein graft to the right coronary artery in 2009  Patient had cardiac catheterization in 2013 showing severe circumflex disease but with patent grafts  Recent stress test did show ischemia    Patient has stable angina syndrome  3  Nontransmural myocardial infarction type 2 related to severe sepsis superimposed upon CAD  4  Mild to moderate aortic stenosis  5  Diabetes mellitus  6  Hyperlipidemia-on statin therapy  7  Hypertension  Fairly well controlled on beta-blocker only  8  Severe PVD status post intervention of the lower extremities in the past  9  Ongoing smoking with COPD  10  Mild left ventricular systolic dysfunction due to segmental wall motion abnormality-some congestive heart failure responding to 2 doses of furosemide    Plan: Will put patient back on usual medications which include metoprolol XL but adjust dose to 75 mg daily  Continue aspirin  Restart Plavix  Advance atorvastatin to his usual dose of 80 mg daily  Restart lisinopril at 10 mg daily  Restart Ranexa  Will stop nitrates with addition of Ranexa  Hold on HCTZ for now    Was on 25 mg daily at home      Jeff Munoz MD

## 2019-01-14 NOTE — PLAN OF CARE
Problem: DISCHARGE PLANNING - CARE MANAGEMENT  Goal: Discharge to post-acute care or home with appropriate resources  INTERVENTIONS:  - Conduct assessment to determine patient/family and health care team treatment goals, and need for post-acute services based on payer coverage, community resources, and patient preferences, and barriers to discharge  - Address psychosocial, clinical, and financial barriers to discharge as identified in assessment in conjunction with the patient/family and health care team  - Arrange appropriate level of post-acute services according to patients   needs and preference and payer coverage in collaboration with the physician and health care team  - Communicate with and update the patient/family, physician, and health care team regarding progress on the discharge plan  - Arrange appropriate transportation to post-acute venues  Outcome: Adequate for Discharge  No CM dc concerns/needs at the time right now, but CM dept will follow

## 2019-01-15 LAB
ANION GAP SERPL CALCULATED.3IONS-SCNC: 8 MMOL/L (ref 4–13)
BASOPHILS # BLD AUTO: 0.03 THOUSANDS/ΜL (ref 0–0.1)
BASOPHILS NFR BLD AUTO: 0 % (ref 0–1)
BUN SERPL-MCNC: 8 MG/DL (ref 5–25)
CALCIUM SERPL-MCNC: 8 MG/DL (ref 8.3–10.1)
CHLORIDE SERPL-SCNC: 101 MMOL/L (ref 100–108)
CO2 SERPL-SCNC: 26 MMOL/L (ref 21–32)
CREAT SERPL-MCNC: 0.68 MG/DL (ref 0.6–1.3)
EOSINOPHIL # BLD AUTO: 0.15 THOUSAND/ΜL (ref 0–0.61)
EOSINOPHIL NFR BLD AUTO: 1 % (ref 0–6)
ERYTHROCYTE [DISTWIDTH] IN BLOOD BY AUTOMATED COUNT: 13.5 % (ref 11.6–15.1)
GFR SERPL CREATININE-BSD FRML MDRD: 96 ML/MIN/1.73SQ M
GLUCOSE SERPL-MCNC: 203 MG/DL (ref 65–140)
GLUCOSE SERPL-MCNC: 232 MG/DL (ref 65–140)
GLUCOSE SERPL-MCNC: 233 MG/DL (ref 65–140)
GLUCOSE SERPL-MCNC: 235 MG/DL (ref 65–140)
GLUCOSE SERPL-MCNC: 238 MG/DL (ref 65–140)
GLUCOSE SERPL-MCNC: 244 MG/DL (ref 65–140)
GLUCOSE SERPL-MCNC: 257 MG/DL (ref 65–140)
HCT VFR BLD AUTO: 34 % (ref 36.5–49.3)
HGB BLD-MCNC: 11.4 G/DL (ref 12–17)
IMM GRANULOCYTES # BLD AUTO: 0.05 THOUSAND/UL (ref 0–0.2)
IMM GRANULOCYTES NFR BLD AUTO: 1 % (ref 0–2)
LYMPHOCYTES # BLD AUTO: 1.33 THOUSANDS/ΜL (ref 0.6–4.47)
LYMPHOCYTES NFR BLD AUTO: 12 % (ref 14–44)
MAGNESIUM SERPL-MCNC: 1.5 MG/DL (ref 1.6–2.6)
MCH RBC QN AUTO: 31 PG (ref 26.8–34.3)
MCHC RBC AUTO-ENTMCNC: 33.5 G/DL (ref 31.4–37.4)
MCV RBC AUTO: 92 FL (ref 82–98)
MONOCYTES # BLD AUTO: 1.58 THOUSAND/ΜL (ref 0.17–1.22)
MONOCYTES NFR BLD AUTO: 14 % (ref 4–12)
NEUTROPHILS # BLD AUTO: 7.9 THOUSANDS/ΜL (ref 1.85–7.62)
NEUTS SEG NFR BLD AUTO: 72 % (ref 43–75)
NRBC BLD AUTO-RTO: 0 /100 WBCS
PLATELET # BLD AUTO: 200 THOUSANDS/UL (ref 149–390)
PMV BLD AUTO: 10 FL (ref 8.9–12.7)
POTASSIUM SERPL-SCNC: 3.6 MMOL/L (ref 3.5–5.3)
RBC # BLD AUTO: 3.68 MILLION/UL (ref 3.88–5.62)
SODIUM SERPL-SCNC: 135 MMOL/L (ref 136–145)
WBC # BLD AUTO: 11.04 THOUSAND/UL (ref 4.31–10.16)

## 2019-01-15 PROCEDURE — 99232 SBSQ HOSP IP/OBS MODERATE 35: CPT | Performed by: HOSPITALIST

## 2019-01-15 PROCEDURE — 99232 SBSQ HOSP IP/OBS MODERATE 35: CPT | Performed by: INTERNAL MEDICINE

## 2019-01-15 PROCEDURE — G8979 MOBILITY GOAL STATUS: HCPCS

## 2019-01-15 PROCEDURE — 85025 COMPLETE CBC W/AUTO DIFF WBC: CPT | Performed by: HOSPITALIST

## 2019-01-15 PROCEDURE — 80048 BASIC METABOLIC PNL TOTAL CA: CPT | Performed by: HOSPITALIST

## 2019-01-15 PROCEDURE — 83735 ASSAY OF MAGNESIUM: CPT | Performed by: HOSPITALIST

## 2019-01-15 PROCEDURE — 97163 PT EVAL HIGH COMPLEX 45 MIN: CPT

## 2019-01-15 PROCEDURE — G8978 MOBILITY CURRENT STATUS: HCPCS

## 2019-01-15 PROCEDURE — 82948 REAGENT STRIP/BLOOD GLUCOSE: CPT

## 2019-01-15 RX ORDER — CLOPIDOGREL BISULFATE 75 MG/1
75 TABLET ORAL DAILY
Status: DISCONTINUED | OUTPATIENT
Start: 2019-01-15 | End: 2019-01-22 | Stop reason: HOSPADM

## 2019-01-15 RX ORDER — LANOLIN ALCOHOL/MO/W.PET/CERES
6 CREAM (GRAM) TOPICAL AS NEEDED
Status: DISCONTINUED | OUTPATIENT
Start: 2019-01-15 | End: 2019-01-22 | Stop reason: HOSPADM

## 2019-01-15 RX ORDER — FUROSEMIDE 10 MG/ML
40 INJECTION INTRAMUSCULAR; INTRAVENOUS
Status: COMPLETED | OUTPATIENT
Start: 2019-01-15 | End: 2019-01-16

## 2019-01-15 RX ADMIN — ATORVASTATIN CALCIUM 80 MG: 80 TABLET, FILM COATED ORAL at 17:25

## 2019-01-15 RX ADMIN — CLOPIDOGREL BISULFATE 75 MG: 75 TABLET ORAL at 10:23

## 2019-01-15 RX ADMIN — INSULIN LISPRO 2 UNITS: 100 INJECTION, SOLUTION INTRAVENOUS; SUBCUTANEOUS at 08:05

## 2019-01-15 RX ADMIN — AMPICILLIN SODIUM 2000 MG: 2 INJECTION, POWDER, FOR SOLUTION INTRAMUSCULAR; INTRAVENOUS at 11:42

## 2019-01-15 RX ADMIN — ASPIRIN 81 MG: 81 TABLET, COATED ORAL at 08:05

## 2019-01-15 RX ADMIN — FUROSEMIDE 40 MG: 10 INJECTION, SOLUTION INTRAMUSCULAR; INTRAVENOUS at 10:24

## 2019-01-15 RX ADMIN — INSULIN LISPRO 2 UNITS: 100 INJECTION, SOLUTION INTRAVENOUS; SUBCUTANEOUS at 11:41

## 2019-01-15 RX ADMIN — LISINOPRIL 10 MG: 10 TABLET ORAL at 08:05

## 2019-01-15 RX ADMIN — RANOLAZINE 500 MG: 500 TABLET, FILM COATED, EXTENDED RELEASE ORAL at 08:05

## 2019-01-15 RX ADMIN — INSULIN LISPRO 2 UNITS: 100 INJECTION, SOLUTION INTRAVENOUS; SUBCUTANEOUS at 17:24

## 2019-01-15 RX ADMIN — INSULIN GLARGINE 10 UNITS: 100 INJECTION, SOLUTION SUBCUTANEOUS at 21:51

## 2019-01-15 RX ADMIN — FUROSEMIDE 40 MG: 10 INJECTION, SOLUTION INTRAMUSCULAR; INTRAVENOUS at 17:25

## 2019-01-15 RX ADMIN — INSULIN LISPRO 2 UNITS: 100 INJECTION, SOLUTION INTRAVENOUS; SUBCUTANEOUS at 21:51

## 2019-01-15 RX ADMIN — METOPROLOL SUCCINATE 75 MG: 25 TABLET, EXTENDED RELEASE ORAL at 08:04

## 2019-01-15 RX ADMIN — AMPICILLIN SODIUM 2000 MG: 2 INJECTION, POWDER, FOR SOLUTION INTRAMUSCULAR; INTRAVENOUS at 17:25

## 2019-01-15 RX ADMIN — TAMSULOSIN HYDROCHLORIDE 0.4 MG: 0.4 CAPSULE ORAL at 17:25

## 2019-01-15 RX ADMIN — NICOTINE 1 PATCH: 21 PATCH, EXTENDED RELEASE TRANSDERMAL at 08:04

## 2019-01-15 RX ADMIN — RANOLAZINE 500 MG: 500 TABLET, FILM COATED, EXTENDED RELEASE ORAL at 21:50

## 2019-01-15 RX ADMIN — AMPICILLIN SODIUM 2000 MG: 2 INJECTION, POWDER, FOR SOLUTION INTRAMUSCULAR; INTRAVENOUS at 05:52

## 2019-01-15 RX ADMIN — MELATONIN TAB 3 MG 6 MG: 3 TAB at 01:33

## 2019-01-15 RX ADMIN — MELATONIN TAB 3 MG 6 MG: 3 TAB at 21:50

## 2019-01-15 NOTE — PHYSICAL THERAPY NOTE
PT EVALUATION    72 y o     [id number]    UTI (urinary tract infection) [N39 0]  Fatigue [R53 83]  SOB (shortness of breath) [R06 02]  Elevated troponin [R74 8]  Fever [R50 9]  Sepsis ([facility] ) [A41 9]    Past Medical History:   Diagnosis Date    Diabetes mellitus ([facility] )     Hypertension          Past Surgical History:   Procedure Laterality Date    CORONARY ARTERY BYPASS GRAFT        01/15/19 1638   Note Type   Note type Eval only   Pain Assessment   Pain Type Acute pain   Pain Location Scrotum   Pain Orientation Bilateral   Hospital Pain Intervention(s) Repositioned   Response to Interventions improved   Pain Rating: FLACC (Rest) - Face 0   Pain Rating: FLACC (Rest) - Legs 0   Pain Rating: FLACC (Rest) - Activity 0   Pain Rating: FLACC (Rest) - Cry 0   Pain Rating: FLACC (Rest) - Consolability 0   Score: FLACC (Rest) 0   Pain Rating: FLACC (Activity) - Face 1   Pain Rating: FLACC (Activity) - Legs 1   Pain Rating: FLACC (Activity) - Activity 1   Pain Rating: FLACC (Activity) - Cry 1   Pain Rating: FLACC (Activity) - Consolability 1   Score: FLACC (Activity) 5   Home Living   Type of Home House   Home Layout Multi-level; Laundry in basement;Bed/bath upstairs;Stairs to enter with rails  (4 or 6 ALONA pending entrance)   Bathroom Accessibility Accessible  (2nd floor only)   Home Equipment Cane  (uses outside ouf home, no AD use in home )   Additional Comments resides with brother in 2 story home  2 local daughters who are supportive  Pt drives  Prior Function   Level of [place] Independent with ADLs and functional mobility   Lives With Other (Comment)  (brother)   Receives Help From Family   ADL Assistance Independent   IADLs Independent  (shares household tasks with brother)   Falls in the last 6 months 0   Vocational Retired   Comments I with use of cane outside of home      Restrictions/Precautions   Weight Bearing Precautions Per Order No   Other Precautions Fall Risk;Pain;O2;Multiple lines   General Additional Pertinent History Pt is 73 y/o male admitted wtih sepsis likely 2* UTI and ecoli  SIRS response  Family/Caregiver Present Yes  (second part of eval )   Cognition   Arousal/Participation Cooperative   Orientation Level Oriented to person;Oriented to place;Oriented to time   Following Commands Follows one step commands without difficulty   RUE Assessment   RUE Assessment WFL  (midl end range limtations  Grossly 4-/5)   LUE Assessment   LUE Assessment WFL  (mild end range limitations grossly 4-/5)   RLE Assessment   RLE Assessment WFL  (grossly 4-/5)   LLE Assessment   LLE Assessment WFL  (grossly 4-/5)   Light Touch   RLE Light Touch Grossly intact   LLE Light Touch Grossly intact   Bed Mobility   Supine to Sit 4  Minimal assistance   Additional items Assist x 1; Increased time required; Bedrails;HOB elevated   Transfers   Sit to Stand 4  Minimal assistance   Additional items Assist x 1; Increased time required;Verbal cues   Stand to Sit 4  Minimal assistance   Additional items Assist x 1; Increased time required;Verbal cues;Armrests   Additional Comments cues for hand placement  Increased time  noted small amount of liquid stool drop on floor upon standing  Pt declines ambualtion to bathroom at this time  Pad noted to be soiled  Pt continues to decline attempt at use of bathroom, denies need for cleaning  Ambulation/Elevation   Gait pattern Decreased foot clearance; Antalgic   Gait Assistance 4  Minimal assist   Additional items Assist x 1;Verbal cues; Tactile cues   Assistive Device Rolling walker   Distance 8'x1 bed to chair  Sats on 3L 94%  Declines further ambulation at this time     Balance   Static Sitting Fair +   Dynamic Sitting Fair   Static Standing Fair -   Dynamic Standing Poor +   Ambulatory Poor +   Endurance Deficit   Endurance Deficit Yes   Endurance Deficit Description fatigue, pain, weakness   Activity Tolerance   Activity Tolerance Patient limited by fatigue;Patient limited by pain Medical Staff Made Aware Nurse, Kandice Blanton   Nurse Made Aware yes   Assessment   Prognosis Good   Problem List Decreased strength;Decreased endurance; Impaired balance;Decreased mobility;Pain;Decreased safety awareness   Assessment Pt is 71 y/o male admitted with severe sepsis, bacteremia, UTI with epididymitis, urinary retention, NSTEMI type 2  PT consulted  Activity as tolerated  Prior to admission was completely independent in multistory home  Using cane only for community distances  Denies falls and reports shares household chores with brother  Currently presents with decreased general strength, activity tolerance, balance and mobility  Requires Emy for bed mobility, transfers and short distance ambulation with RW support  Fatigues with short distance and declines trial of further ambulation 2* calf tightness with WB activity  O2 sats on 3L 94% with short distances  Applied soft care cushion to chair for improved comfort  Will continue to benefit from ongoing PT in order to assure ability to achieve independent function for safe d/c to home  Monitor disposition with progress  Rhb v home with PT  May need RW  Will follow  Barriers to Discharge Inaccessible home environment   Barriers to Discharge Comments 2 story home   Goals   Patient Goals get better   STG Expiration Date 01/25/19   Short Term Goal #1 10 days: 1)  Pt will perform bed mobility with Kary demonstrating appropriate technique 100% of the time in order to improve function  2)  Perform all transfers with Kary demonstrating safe and appropriate technique 100% of the time in order to improve ability to negotiate safely in home environment  3) Amb with least restrictive AD > 200'x1 with mod I in order to demonstrate ability to negotiate in home environment  4)  Improve overall strength and balance 1/2 grade in order to optimize ability to perform functional tasks and reduce fall risk  5) Increase activity tolerance to 45 minutes in order to improve endurance to functional tasks  6)  Negotiate stairs using most appropriate technique and S in order to be able to negotiate safely in home environment  7) PT for ongoing patient and family/caregiver education, DME needs and d/c planning in order to promote highest level of function in least restrictive environment  Treatment Day 0   Plan   Treatment/Interventions Functional transfer training;LE strengthening/ROM; Elevations; Therapeutic exercise; Endurance training;Patient/family training;Equipment eval/education; Bed mobility;Gait training; Compensatory technique education;Continued evaluation;Spoke to nursing;Family   PT Frequency Other (Comment)  (4-5x/wk)   Recommendation   Recommendation Home with family support;Home PT; Short-term skilled PT  (pending progress, pt goal is home )   Equipment Recommended Walker  (possible RW)   PT - OK to Discharge No  (to [facility] goals for safe d/c to home )   Modified Anita Scale   Modified Anita Scale 4   Barthel Index   Feeding 10   Bathing 0   Grooming Score 5   Dressing Score 5   Bladder Score 0   Bowels Score 5   Toilet Use Score 5   Transfers (Bed/Chair) Score 10   Mobility (Level Surface) Score 0   Stairs Score 0   Barthel Index Score 40     History: co - morbidities, fall risk, use of assistive device, assist for adl's,  multiple lines  Exam: impairments in locomotion, musculoskeletal, balance,posture,skin integrity, cardiac, pulmonary, barthel 40  Clinical: unstable/unpredictable   Complexity:high    Sarah Blair, PT

## 2019-01-15 NOTE — PROGRESS NOTES
Progress Note - Sonia Nathan 1945, 72 y o  male MRN: [id number]    Unit/Bed#: E5 -01 Encounter: [id number]    Primary Care Provider: No primary care provider on file  Date and time admitted to hospital: 2019  2:30 AM        * SIRS (systemic inflammatory response syndrome) (HCC)   Assessment & Plan    Klebsiella bacteremia likely due to urinary source  Appreciate ID help  Recommend IV Ampicillin through      Shortness of breath   Assessment & Plan    Spoke with Dr John Cunningham  Adding Lasix as the patient is developing some fluid overload with JVD     Acute cystitis without hematuria   Assessment & Plan    Likely caused the Klebsiella bacteremia  Planning on IV Ampiciilin through            Subjective:   Feels better  A little less sob  No fever  He is very weak      Objective:     Vitals:   Temp (24hrs), Av 9 °F (36 6 °C), Min:97 7 °F (36 5 °C), Max:98 2 °F (36 8 °C)    Temp:  [97 7 °F (36 5 °C)-98 2 °F (36 8 °C)] 97 9 °F (36 6 °C)  HR:  [77-88] 88  Resp:  [18-20] 18  BP: (120-132)/(59-66) 131/61  SpO2:  [96 %-98 %] 96 %  Body mass index is 24 03 kg/m²  Input and Output Summary (last 24 hours): Intake/Output Summary (Last 24 hours) at 01/15/19 1123  Last data filed at 01/15/19 1100   Gross per 24 hour   Intake                0 ml   Output             2150 ml   Net            -2150 ml       Physical Exam:     Physical Exam   HENT:   Head: Normocephalic and atraumatic  Eyes: Pupils are equal, round, and reactive to light  EOM are normal    Neck: JVD (1/2 way up) present  Cardiovascular: Normal rate and regular rhythm  Exam reveals no gallop and no friction rub  No murmur heard  Pulmonary/Chest: Effort normal and breath sounds normal  He has no wheezes  He has no rales  Abdominal: Soft  There is no tenderness  Musculoskeletal: He exhibits edema (trace bilat LE edema)  Nursing note and vitals reviewed              Additional Data:     Labs:      Results from last 7 days  Lab Units 01/15/19  0453   WBC Thousand/uL 11 04*   HEMOGLOBIN g/dL 11 4*   HEMATOCRIT % 34 0*   PLATELETS Thousands/uL 200   NEUTROS PCT % 72   LYMPHS PCT % 12*   MONOS PCT % 14*   EOS PCT % 1       Results from last 7 days  Lab Units 01/15/19  0453  01/09/19  0241 01/09/19  0237   POTASSIUM mmol/L 3 6  < >  --  3 5   CHLORIDE mmol/L 101  < >  --  95*   CO2 mmol/L 26  < >  --  25   CO2, I-STAT mmol/L  --   --  23  --    BUN mg/dL 8  < >  --  14   CREATININE mg/dL 0 68  < >  --  1 05   CALCIUM mg/dL 8 0*  < >  --  9 0   ALK PHOS U/L  --   --   --  139*   ALT U/L  --   --   --  32   AST U/L  --   --   --  26   GLUCOSE, ISTAT mg/dl  --   --  212*  --    < > = values in this interval not displayed      Results from last 7 days  Lab Units 01/09/19  0237   INR  1 16       Results from last 7 days  Lab Units 01/15/19  0754 01/14/19  2051 01/14/19  1725 01/14/19  1236 01/14/19  0747 01/13/19  2216 01/13/19  2027 01/13/19  1557 01/13/19  1110 01/13/19  0715 01/12/19  2114 01/12/19  1710   POC GLUCOSE mg/dl 232* 244* 258* 242* 177* 246* 216* 250* 240* 145* 230* 191*       Results from last 7 days  Lab Units 01/14/19  0503   HEMOGLOBIN A1C % 7 5*           * I Have Reviewed All Lab Data     Recent Cultures (last 7 days):       Results from last 7 days  Lab Units 01/09/19  0304 01/09/19  0239 01/09/19  0237   BLOOD CULTURE   --  Escherichia coli*  Actinomyces species* Escherichia coli*  Actinomyces species*  Streptococcus anginosus*   GRAM STAIN RESULT   --  Gram negative rods  Gram positive rods Gram negative rods  Gram positive cocci in chains  Gram positive rods   URINE CULTURE  >100,000 cfu/ml Escherichia coli*  >100,000 cfu/ml Alpha Hemolytic Streptococcus NOT Enterococcus*  --   --    INFLUENZA B PCR   --   --  None Detected   RSV PCR   --   --  None Detected         Last 24 Hours Medication List:     Current Facility-Administered Medications:  acetaminophen 650 mg Oral Q6H PRN Ton Hoang PA-C ampicillin 2,000 mg Intravenous Q6H Beatris Randhawa MD Last Rate: 2,000 mg (01/15/19 0552)   aspirin 81 mg Oral Daily Mark Burnham MD    atorvastatin 80 mg Oral Daily With Jannet Mehta MD    clopidogrel 75 mg Oral Daily Madan Roblero MD    furosemide 40 mg Intravenous BID (diuretic) Madan Roblero MD    guaiFENesin 200 mg Oral Q4H PRN VARGAS Hester    insulin glargine 10 Units Subcutaneous HS Claudeen Shin, MD    insulin lispro 1-5 Units Subcutaneous TID AC Claudeen Shin, MD    insulin lispro 1-5 Units Subcutaneous HS Claudeen Shin, MD    lisinopril 10 mg Oral Daily Mark Burnham MD    melatonin 6 mg Oral HS VARGAS Hester    melatonin 6 mg Oral PRN VARGAS Centeno    metoprolol succinate 75 mg Oral Daily Mark Burnham MD    nicotine 1 patch Transdermal Daily Usama Hunter PA-C    ondansetron 4 mg Intravenous Q4H PRN [place] Kicks Spatzer, CRNP    ranolazine 500 mg Oral Q12H [facility] Mark Burnham MD    tamsulosin 0 4 mg Oral Daily With Mikel Vargas MD          VTE Pharmacologic Prophylaxis:   Pharmacologic:   Current Length of Stay: 6 day(s)    Current Patient Status: Inpatient       Discharge Plan:   Code Status: Level 1 - Full Code           Today, Patient Was Seen By: Sindi Lopez DO    ** Please Note: Dictation voice to text software may have been used in the creation of this document   **

## 2019-01-15 NOTE — UTILIZATION REVIEW
Arty Cutter, RN Registered Nurse Signed   Utilization Review Date of Service: 1/9/2019  3:20 PM         []Hide copied text  Initial Clinical Review     Admission: Date/Time/Statement: 1/9/19 @ 0432         Orders Placed This Encounter   Procedures    Inpatient Admission (expected length of stay for this patient is greater than two midnights)       Standing Status:   Standing       Number of Occurrences:   1       Order Specific Question:   Admitting Physician       Answer:   Lucia Mazariegos [[id number]]       Order Specific Question:   Level of Care       Answer:   Med Surg [[id number]]       Order Specific Question:   Estimated length of stay       Answer:   More than 2 Midnights       Order Specific Question:   Certification       Answer:   I certify that inpatient services are medically necessary for this patient for a duration of greater than two midnights  See H&P and MD Progress Notes for additional information about the patient's course of treatment       ED: Date/Time/Mode of Arrival:             ED Arrival Information      Expected Arrival Acuity Means of Arrival Escorted By Service Admission Type     - 1/9/2019 02:29 Emergent Ambulance [place] [facility] Hospitalist Emergency     Arrival Complaint     medical problem          Chief Complaint:        Chief Complaint   Patient presents with    Shortness of Breath       Pt brought in via ems from home with sob that started tonight  Pt has cough, nasal congestion, fever at home        History of Illness: MARVIN SACKS is a 72 y  o  male comes to the emergency department from home via EMS reporting that he has shortness of breath and difficulty breathing   Has no chest pain   He denies swelling of the lower extremities   He denies abdominal pain   He does report to some urinary retention type symptoms and is not able to start voiding when he wants to at times    He generally feels weak   He is a nausea, vomiting, diarrhea, constipation      Denies: Chest pain, Shortness Rakel of Breath, Nausea, Vomiting, Diarrhea, Dysuria        ED Vital Signs:            ED Triage Vitals   Temperature Pulse Respirations Blood Pressure SpO2   01/09/19 0233 01/09/19 0233 01/09/19 0233 01/09/19 0233 01/09/19 0233   (!) 102 8 °F (39 3 °C) 105 (!) 32 147/65 92 %       Temp Source Heart Rate Source Patient Position - Orthostatic VS BP Location FiO2 (%)   01/09/19 0233 01/09/19 0233 01/09/19 0455 01/09/19 0233 --   Oral Monitor Lying Right arm         Pain Score           01/09/19 0455           No Pain                Wt Readings from Last 1 Encounters:   01/09/19 70 7 kg (155 lb 13 8 oz)      Vital Signs (abnormal): above     Pertinent Labs/Diagnostic Test Results:   Lactic  Acid   4 7  U/A   Mod  Blood  1+ protein    >  1000   Glucose    sm leukocyte  BNP   2,206  Troponin  0 13    Chloride   95  AG  14  WBC   2 62  Ct  Chest:     Motion limited examination   No evidence of pulmonary embolism to the proximal segmental level  2   Emphysematous changes of lungs with linear atelectasis in the right lower lobe   No focal consolidation  3   A small calcified left hilar lymph node is seen which may be due to sequela of prior granulomatous disease    4   Large noncalcified plaques are seen within the proximal abdominal aorta with areas of ulceration      ED Treatment:              Medication Administration from 01/09/2019 0229 to 01/09/2019 0530        Date/Time Order Dose Route Action Action by Comments       01/09/2019 0347 sodium chloride 0 9 % bolus 1,000 mL 0 mL Intravenous Stopped Andree Morales RN         01/09/2019 0240 sodium chloride 0 9 % bolus 1,000 mL 1,000 mL Intravenous 5655 Lety Monterroso RN         01/09/2019 0245 acetaminophen (TYLENOL) rectal suppository 650 mg 650 mg Rectal Given Andree Morales RN         01/09/2019 0313 iohexol (OMNIPAQUE) 350 MG/ML injection (SINGLE-DOSE) 85 mL 85 mL Intravenous Given Yany Marley         01/09/2019 0401 ceftriaxone (ROCEPHIN) 1 g/50 mL in dextrose IVPB 0 mg Intravenous Stopped Giovanna Andrade RN         01/09/2019 0331 ceftriaxone (ROCEPHIN) 1 g/50 mL in dextrose IVPB 1,000 mg Intravenous [address] Giovanna Andrade RN         01/09/2019 0428 sodium chloride 0 9 % bolus 1,000 mL 0 mL Intravenous Stopped Giovanna Andrade RN         01/09/2019 0338 sodium chloride 0 9 % bolus 1,000 mL 1,000 mL Intravenous 5655 Frist Sofya Horne RN         01/09/2019 0428 ibuprofen (MOTRIN) tablet 600 mg 600 mg Oral Given Sandeep Horne RN            Past Medical/Surgical History:         Active Ambulatory Problems     Diagnosis Date Noted    No Active Ambulatory Problems           Resolved Ambulatory Problems     Diagnosis Date Noted    No Resolved Ambulatory Problems           Past Medical History:   Diagnosis Date    Diabetes mellitus ([facility] )      Hypertension        Admitting Diagnosis: UTI (urinary tract infection) [N39 0]  Fatigue [R53 83]  SOB (shortness of breath) [R06 02]  Elevated troponin [R74 8]  Fever [R50 9]  Sepsis ([facility] ) [A41 9]  Age/Sex: 72 y o  male      Assessment/Plan:       SIRS (systemic inflammatory response syndrome) (HCC)   Assessment & Plan     · Patient febrile, with tachycardia and source of urine  · Will continue IV ceftriaxone  · Elevated lactic acid and will gently hydrate and recheck      Acute cystitis without hematuria   Assessment & Plan     · Continue ceftriaxone      Shortness of breath   Assessment & Plan     · BNP 2,206 on admission  · CTA to rule PE of the chest does not identify signs of volume overload  · Will monitor as clinical picture improves          Elevated troponin I level   Assessment & Plan     · Initial troponin 0 13  · No reported chest pain and represents likely noncardiac elevation of troponin      Lactic acidosis   Assessment & Plan     · Initial was 4 7  · Will gently hydrate and recheck in 2 hr      Tobacco abuse   Assessment & Plan     · Patient reports smoking 1 pack of cigarettes per day  · Encourage total cessation and will provide nicotine patch during hospitalization      Anticipated Length of Stay will be: MORE THAN 2 (TWO) Midnights:            Admission Orders:   IP  1/9  @    0432  Scheduled Meds:   Current Facility-Administered Medications:  acetaminophen 650 mg Oral Q6H PRN Rubia Shoulders, PA-C     cefepime 1,000 mg Intravenous Q12H Jaz Lafleur MD Last Rate: Stopped (01/09/19 1119)   heparin (porcine) 5,000 Units Subcutaneous Q8H [address] Aristeo Mckinney PA-C     insulin lispro 1-5 Units Subcutaneous TID AC Kay Hines MD     insulin lispro 1-5 Units Subcutaneous HS Kay Hines MD     lactated ringers 125 mL/hr Intravenous Continuous Jaz Lafleur MD Last Rate: 125 mL/hr (01/09/19 1444)   nicotine 1 patch Transdermal Daily Rubia Shoulders, PA-C        Continuous Infusions:   lactated ringers 125 mL/hr Last Rate: 125 mL/hr (01/09/19 1444)      PRN Meds:   acetaminophen      Tele  Cardiac  Diet  Serial troponin  Cons  Cardiology     Per  Cardiology  Consult:    1  SIRS-Probable sepsis:  Ongoing workup with undefined source thus far  2  Elevated troponin/non STEMI - probably type 2 secondary to demand ischemia in the setting of sepsis and underlying CAD  3  CAD, remote CABG x2 (1990s):  Details of CABG unclear   Current CT scan suggest significant coronary calcifications  4  Right bundle branch block  5  Possible cardiomyopathy:  Patient offers suggestion of LV dysfunction though details unknown and no records currently available     · Will request records of [facility] for insight into the patient's cardiac history  · Patient reports having had an echocardiogram in the last few months at the office of his usual cardiologist - as such we will defer immediately repeating one unless the patient should have some other sign of ACS or hemodynamic instability    · In the absence of chest pain and ECG changes would not heparinize at this time   Continue aspirin   No beta-blocker in the setting of hypotension  · Check ECG now and p r n  for any complaint of chest discomfort  · Pt to be transferred to higher level of care (stepdown ICU) in light of hypotension, lactic acidosis        [address] Utilization Review Department  Phone: [phone]; Fax [phone]  [email or web address]  ATTENTION: Please call with any questions or concerns to [phone]  and carefully listen to the prompts so that you are directed to the right person  Send all requests for admission clinical reviews, approved or denied determinations and any other requests to fax [phone]   All voicemails are confidential

## 2019-01-15 NOTE — ASSESSMENT & PLAN NOTE
Klebsiella bacteremia likely due to urinary source  Appreciate ID help  Recommend IV Ampicillin through 1/22

## 2019-01-15 NOTE — PROGRESS NOTES
Progress Note - Infectious Disease   Lupe Mcclure 72 y o  male MRN: [id number]  Unit/Bed#: E5 -01 Encounter: [id number]      Impression/Recommendations:  1   Severe sepsis   POA:  Fever, leukopenia, and lactic acidosis   Due to # 2/3  Merrilyn Poet with evolving infiltrate but no clinical signs of pneumonia   CT A/P negative   Hemodynamically stable and nontoxic   Clinically improving   Procalcitonin 91 49 > 25 82 > 13 42  Rec:  ? Continue antibiotics as below  ? Follow temperatures closely  ? Supportive care as per the primary service     2   Polymicrobial bacteremia   E  Coli/Actionomyces/Strep anginosis  Due to # 3  CT A/P otherwise negative  TTE negative  Repeat blood cultures negative  Rec:  ? Continue ampicillin for 14 days total of IV antibiotics through 19  ? Follow up repeat blood cultures     3   Polymicrobial UTI/epididymitis   With pyuria and urinary retention  E  Coli/Alpha Strep  Also Actinomyces in blood  Suspect unusual organisms due to ?uncircumcised state, ? Poor penile hygiene  Patient denies sexual activity  Rec:  ? Continue antibiotics as above     4   Urinary retention   Consider BPH   Status post Marshall  Failed voiding trial and catheter was replaced  Rec:  ? Continue Marshall catheter per urology  ? Follow urine output closely     5   DM with hyperglycemia   On insulin   Probable risk factor for above infection  Rec:  ? Continue management as per the primary service     Antibiotics:  Ampicillin #5  Antibiotics #7    Subjective:  Patient seen on AM rounds  Denies fevers, chills, sweats, nausea, vomiting, or diarrhea  Denies sexual activity  24 Hour Events:  No documented fevers, chills, sweats, nausea, vomiting, or diarrhea      Objective:  Vitals:  Temp:  [97 7 °F (36 5 °C)-98 2 °F (36 8 °C)] 97 9 °F (36 6 °C)  HR:  [77-88] 88  Resp:  [18-20] 18  BP: (125-132)/(59-66) 131/61  SpO2:  [96 %-98 %] 96 %  Temp (24hrs), Av 9 °F (36 6 °C), Min:97 7 °F (36 5 °C), Max:98 2 °F (36 8 °C)  Current: Temperature: 97 9 °F (36 6 °C)    Physical Exam:   General:  No acute distress  Psychiatric:  Awake and alert  Pulmonary:  Normal respiratory excursion without accessory muscle use  Abdomen:  Soft, nontender  Extremities:  No edema  Skin:  No rashes  :  Small amount of malodorous bloody discharge at penis tip  Uncircumcised  Unable to fully pull back foreskin  Lab Results:  I have personally reviewed pertinent labs  Results from last 7 days  Lab Units 01/15/19  0453 01/14/19  0503 01/13/19  0558  01/09/19  0241 01/09/19  0237   POTASSIUM mmol/L 3 6 3 8 3 5  < >  --  3 5   CHLORIDE mmol/L 101 101 105  < >  --  95*   CO2 mmol/L 26 28 24  < >  --  25   CO2, I-STAT mmol/L  --   --   --   --  23  --    BUN mg/dL 8 11 12  < >  --  14   CREATININE mg/dL 0 68 0 74 0 65  < >  --  1 05   EGFR ml/min/1 73sq m 96 93 98  < > 90 71   GLUCOSE, ISTAT mg/dl  --   --   --   --  212*  --    CALCIUM mg/dL 8 0* 8 0* 7 5*  < >  --  9 0   AST U/L  --   --   --   --   --  26   ALT U/L  --   --   --   --   --  32   ALK PHOS U/L  --   --   --   --   --  139*   < > = values in this interval not displayed      Results from last 7 days  Lab Units 01/15/19  0453 01/14/19  0503 01/13/19  0558   WBC Thousand/uL 11 04* 12 29* 11 91*   HEMOGLOBIN g/dL 11 4* 12 0 12 0   PLATELETS Thousands/uL 200 170 121*       Results from last 7 days  Lab Units 01/09/19  0304 01/09/19  0239 01/09/19  0237   BLOOD CULTURE   --  Escherichia coli*  Actinomyces species* Escherichia coli*  Actinomyces species*  Streptococcus anginosus*   GRAM STAIN RESULT   --  Gram negative rods  Gram positive rods Gram negative rods  Gram positive cocci in chains  Gram positive rods   URINE CULTURE  >100,000 cfu/ml Escherichia coli*  >100,000 cfu/ml Alpha Hemolytic Streptococcus NOT Enterococcus*  --   --    INFLUENZA B PCR   --   --  None Detected   RSV PCR   --   --  None Detected       Imaging Studies:   I have personally reviewed pertinent imaging study reports and images in PACS  EKG, Pathology, and Other Studies:   I have personally reviewed pertinent reports

## 2019-01-15 NOTE — PROGRESS NOTES
Progress Note - Cardiology   Ramu Cabral 72 y o  male MRN: [id number]  Unit/Bed#: E5 -01 Encounter: [id number]      Assessment:     1  Severe sepsis with E coli bacteremia likely urinary source  2  Elevated troponin-likely type 2 non ST elevation myocardial infarction in the setting of sepsis-peak troponin of 11 5  3  Coronary disease status post coronary bypass grafting and stenting in the past with ejection fraction currently 45% with severe hypokinesis of the basal to mid inferior and basal to mid inferolateral walls with mild to moderate aortic stenosis and mild-to-moderate mitral regurgitation  4  Diabetes  5  Hypertension  6  Tobacco use with COPD on imaging  7  Large noncalcified abdominal aortic plaque with areas of ulceration  8  NSVT    Discussion/Recommendations:    Would initiate Lasix-some evidence of volume overload and still on oxygen       Otherwise on beta-blocker/aspirin/Plavix/high dose statin      Subjective:  Feels tired-no other complaints      Physical Exam:  GEN:  NAD  HEENT:  MMM, NCAT, pink conjunctiva, EOMI, nonicteric sclera  CV:  Mild JVD, RR, NO M/R/G, +S1/S2, NO PARASTERNAL HEAVE/THRILL, NO LE EDEMA, NO HEPATIC SYSTOLIC PULSATION, WARM EXTREMITIES  RESP:  CTAB/L  ABD:  SOFT, NT, NO GROSS ORGANOMEGALY        Vitals:   /61 (BP Location: Right arm)   Pulse 88   Temp 97 9 °F (36 6 °C) (Temporal)   Resp 18   Ht 5' 8" (1 727 m)   Wt 71 7 kg (158 lb 1 1 oz)   SpO2 96%   BMI 24 03 kg/m²   Vitals:    01/09/19 1123 01/10/19 0537   Weight: 70 7 kg (155 lb 13 8 oz) 71 7 kg (158 lb 1 1 oz)       Intake/Output Summary (Last 24 hours) at 01/15/19 0905  Last data filed at 01/15/19 0536   Gross per 24 hour   Intake                0 ml   Output             1375 ml   Net            -1375 ml         TELEMETRY:  No events  Lab Results:    Results from last 7 days  Lab Units 01/15/19  0453   WBC Thousand/uL 11 04*   HEMOGLOBIN g/dL 11 4*   HEMATOCRIT % 34 0*   PLATELETS Thousands/uL 200 Results from last 7 days  Lab Units 01/15/19  0453  01/09/19  0241 01/09/19  0237   POTASSIUM mmol/L 3 6  < >  --  3 5   CHLORIDE mmol/L 101  < >  --  95*   CO2 mmol/L 26  < >  --  25   CO2, I-STAT mmol/L  --   --  23  --    BUN mg/dL 8  < >  --  14   CREATININE mg/dL 0 68  < >  --  1 05   CALCIUM mg/dL 8 0*  < >  --  9 0   ALK PHOS U/L  --   --   --  139*   ALT U/L  --   --   --  32   AST U/L  --   --   --  26   GLUCOSE, ISTAT mg/dl  --   --  212*  --    < > = values in this interval not displayed  Results from last 7 days  Lab Units 01/15/19  0453  01/09/19  0241   POTASSIUM mmol/L 3 6  < >  --    CHLORIDE mmol/L 101  < >  --    CO2 mmol/L 26  < >  --    CO2, I-STAT mmol/L  --   --  23   BUN mg/dL 8  < >  --    CREATININE mg/dL 0 68  < >  --    GLUCOSE, ISTAT mg/dl  --   --  212*   CALCIUM mg/dL 8 0*  < >  --    < > = values in this interval not displayed            Medications:    Current Facility-Administered Medications:     acetaminophen (TYLENOL) tablet 650 mg, 650 mg, Oral, Q6H PRN, Saturnino Valle PA-C, 650 mg at 01/12/19 0709    ampicillin (OMNIPEN) 2,000 mg in sodium chloride 0 9 % 100 mL IVPB, 2,000 mg, Intravenous, Q6H, Ulysses Quezada MD, Last Rate: 200 mL/hr at 01/15/19 0552, 2,000 mg at 01/15/19 0552    aspirin (ECOTRIN LOW STRENGTH) EC tablet 81 mg, 81 mg, Oral, Daily, Janneth Chapin MD, 81 mg at 01/15/19 0805    atorvastatin (LIPITOR) tablet 80 mg, 80 mg, Oral, Daily With Luis Enrique Sparks MD, 80 mg at 01/14/19 1758    guaiFENesin (ROBITUSSIN) oral solution 200 mg, 200 mg, Oral, Q4H PRN, VARGAS Mojica, 200 mg at 01/13/19 0832    insulin glargine (LANTUS) subcutaneous injection 10 Units 0 1 mL, 10 Units, Subcutaneous, HS, Fawad Robles MD, 10 Units at 01/14/19 2138    insulin lispro (HumaLOG) 100 units/mL subcutaneous injection 1-5 Units, 1-5 Units, Subcutaneous, TID AC, 2 Units at 01/15/19 0805 **AND** Fingerstick Glucose (POCT), , , TID AC, Fawad Robles MD    insulin lispro (HumaLOG) 100 units/mL subcutaneous injection 1-5 Units, 1-5 Units, Subcutaneous, HS, Hever Selby MD, 2 Units at 01/14/19 2139    lisinopril (ZESTRIL) tablet 10 mg, 10 mg, Oral, Daily, Sarwat Serrano MD, 10 mg at 01/15/19 0805    melatonin tablet 6 mg, 6 mg, Oral, HS, VARGAS Wynn, 6 mg at 01/14/19 2138    melatonin tablet 6 mg, 6 mg, Oral, PRN, YONATAN KnappNP, 6 mg at 01/15/19 0133    metoprolol succinate (TOPROL-XL) 24 hr tablet 75 mg, 75 mg, Oral, Daily, Sarwat Srerano MD, 75 mg at 01/15/19 0804    nicotine (NICODERM CQ) 21 mg/24 hr TD 24 hr patch 1 patch, 1 patch, Transdermal, Daily, Cyndie Styles PA-C, 1 patch at 01/15/19 0804    ondansetron (ZOFRAN) injection 4 mg, 4 mg, Intravenous, Q4H PRN, Virginia Mars Spatzer, CRNP, 4 mg at 01/12/19 2213    ranolazine (RANEXA) 12 hr tablet 500 mg, 500 mg, Oral, Q12H [address], Sarwat Serrano MD, 500 mg at 01/15/19 0805    tamsulosin (FLOMAX) capsule 0 4 mg, 0 4 mg, Oral, Daily With Rick Velez MD, 0 4 mg at 01/14/19 1758    Portions of the record may have been created with voice recognition software  Occasional wrong word or "sound a like" substitutions may have occurred due to the inherent limitations of voice recognition software  Read the chart carefully and recognize, using context, where substitutions have occurred

## 2019-01-16 LAB
ANION GAP SERPL CALCULATED.3IONS-SCNC: 7 MMOL/L (ref 4–13)
BASOPHILS # BLD AUTO: 0.04 THOUSANDS/ΜL (ref 0–0.1)
BASOPHILS NFR BLD AUTO: 0 % (ref 0–1)
BUN SERPL-MCNC: 8 MG/DL (ref 5–25)
CALCIUM SERPL-MCNC: 8.1 MG/DL (ref 8.3–10.1)
CHLORIDE SERPL-SCNC: 98 MMOL/L (ref 100–108)
CO2 SERPL-SCNC: 28 MMOL/L (ref 21–32)
CREAT SERPL-MCNC: 0.69 MG/DL (ref 0.6–1.3)
EOSINOPHIL # BLD AUTO: 0.11 THOUSAND/ΜL (ref 0–0.61)
EOSINOPHIL NFR BLD AUTO: 1 % (ref 0–6)
ERYTHROCYTE [DISTWIDTH] IN BLOOD BY AUTOMATED COUNT: 13.6 % (ref 11.6–15.1)
GFR SERPL CREATININE-BSD FRML MDRD: 96 ML/MIN/1.73SQ M
GLUCOSE SERPL-MCNC: 212 MG/DL (ref 65–140)
GLUCOSE SERPL-MCNC: 252 MG/DL (ref 65–140)
GLUCOSE SERPL-MCNC: 286 MG/DL (ref 65–140)
GLUCOSE SERPL-MCNC: 328 MG/DL (ref 65–140)
GLUCOSE SERPL-MCNC: 354 MG/DL (ref 65–140)
HCT VFR BLD AUTO: 34.5 % (ref 36.5–49.3)
HGB BLD-MCNC: 11.9 G/DL (ref 12–17)
IMM GRANULOCYTES # BLD AUTO: 0.08 THOUSAND/UL (ref 0–0.2)
IMM GRANULOCYTES NFR BLD AUTO: 1 % (ref 0–2)
LYMPHOCYTES # BLD AUTO: 1.5 THOUSANDS/ΜL (ref 0.6–4.47)
LYMPHOCYTES NFR BLD AUTO: 13 % (ref 14–44)
MAGNESIUM SERPL-MCNC: 1.8 MG/DL (ref 1.6–2.6)
MCH RBC QN AUTO: 31.8 PG (ref 26.8–34.3)
MCHC RBC AUTO-ENTMCNC: 34.5 G/DL (ref 31.4–37.4)
MCV RBC AUTO: 92 FL (ref 82–98)
MONOCYTES # BLD AUTO: 1.19 THOUSAND/ΜL (ref 0.17–1.22)
MONOCYTES NFR BLD AUTO: 10 % (ref 4–12)
NEUTROPHILS # BLD AUTO: 8.49 THOUSANDS/ΜL (ref 1.85–7.62)
NEUTS SEG NFR BLD AUTO: 75 % (ref 43–75)
NRBC BLD AUTO-RTO: 0 /100 WBCS
PLATELET # BLD AUTO: 260 THOUSANDS/UL (ref 149–390)
PMV BLD AUTO: 9.7 FL (ref 8.9–12.7)
POTASSIUM SERPL-SCNC: 3.2 MMOL/L (ref 3.5–5.3)
RBC # BLD AUTO: 3.74 MILLION/UL (ref 3.88–5.62)
SODIUM SERPL-SCNC: 133 MMOL/L (ref 136–145)
WBC # BLD AUTO: 11.41 THOUSAND/UL (ref 4.31–10.16)

## 2019-01-16 PROCEDURE — 99232 SBSQ HOSP IP/OBS MODERATE 35: CPT | Performed by: INTERNAL MEDICINE

## 2019-01-16 PROCEDURE — 80048 BASIC METABOLIC PNL TOTAL CA: CPT | Performed by: HOSPITALIST

## 2019-01-16 PROCEDURE — 85025 COMPLETE CBC W/AUTO DIFF WBC: CPT | Performed by: HOSPITALIST

## 2019-01-16 PROCEDURE — 97530 THERAPEUTIC ACTIVITIES: CPT

## 2019-01-16 PROCEDURE — 83735 ASSAY OF MAGNESIUM: CPT | Performed by: HOSPITALIST

## 2019-01-16 PROCEDURE — 82948 REAGENT STRIP/BLOOD GLUCOSE: CPT

## 2019-01-16 PROCEDURE — 97116 GAIT TRAINING THERAPY: CPT

## 2019-01-16 PROCEDURE — 97110 THERAPEUTIC EXERCISES: CPT

## 2019-01-16 PROCEDURE — 99232 SBSQ HOSP IP/OBS MODERATE 35: CPT | Performed by: HOSPITALIST

## 2019-01-16 RX ORDER — POTASSIUM CHLORIDE 20 MEQ/1
40 TABLET, EXTENDED RELEASE ORAL ONCE
Status: COMPLETED | OUTPATIENT
Start: 2019-01-16 | End: 2019-01-16

## 2019-01-16 RX ADMIN — RANOLAZINE 500 MG: 500 TABLET, FILM COATED, EXTENDED RELEASE ORAL at 08:49

## 2019-01-16 RX ADMIN — AMPICILLIN SODIUM 2000 MG: 2 INJECTION, POWDER, FOR SOLUTION INTRAMUSCULAR; INTRAVENOUS at 05:39

## 2019-01-16 RX ADMIN — AMPICILLIN SODIUM 2000 MG: 2 INJECTION, POWDER, FOR SOLUTION INTRAMUSCULAR; INTRAVENOUS at 12:12

## 2019-01-16 RX ADMIN — ACETAMINOPHEN 650 MG: 325 TABLET ORAL at 20:46

## 2019-01-16 RX ADMIN — ATORVASTATIN CALCIUM 80 MG: 80 TABLET, FILM COATED ORAL at 16:04

## 2019-01-16 RX ADMIN — INSULIN LISPRO 2 UNITS: 100 INJECTION, SOLUTION INTRAVENOUS; SUBCUTANEOUS at 08:54

## 2019-01-16 RX ADMIN — RANOLAZINE 500 MG: 500 TABLET, FILM COATED, EXTENDED RELEASE ORAL at 20:46

## 2019-01-16 RX ADMIN — CLOPIDOGREL BISULFATE 75 MG: 75 TABLET ORAL at 08:49

## 2019-01-16 RX ADMIN — LISINOPRIL 10 MG: 10 TABLET ORAL at 08:49

## 2019-01-16 RX ADMIN — NICOTINE 1 PATCH: 21 PATCH, EXTENDED RELEASE TRANSDERMAL at 08:51

## 2019-01-16 RX ADMIN — FUROSEMIDE 40 MG: 10 INJECTION, SOLUTION INTRAMUSCULAR; INTRAVENOUS at 16:04

## 2019-01-16 RX ADMIN — AMPICILLIN SODIUM 2000 MG: 2 INJECTION, POWDER, FOR SOLUTION INTRAMUSCULAR; INTRAVENOUS at 23:44

## 2019-01-16 RX ADMIN — ASPIRIN 81 MG: 81 TABLET, COATED ORAL at 08:49

## 2019-01-16 RX ADMIN — POTASSIUM CHLORIDE 40 MEQ: 1500 TABLET, EXTENDED RELEASE ORAL at 16:04

## 2019-01-16 RX ADMIN — AMPICILLIN SODIUM 2000 MG: 2 INJECTION, POWDER, FOR SOLUTION INTRAMUSCULAR; INTRAVENOUS at 17:32

## 2019-01-16 RX ADMIN — FUROSEMIDE 40 MG: 10 INJECTION, SOLUTION INTRAMUSCULAR; INTRAVENOUS at 08:50

## 2019-01-16 RX ADMIN — TAMSULOSIN HYDROCHLORIDE 0.4 MG: 0.4 CAPSULE ORAL at 16:04

## 2019-01-16 RX ADMIN — INSULIN GLARGINE 10 UNITS: 100 INJECTION, SOLUTION SUBCUTANEOUS at 21:14

## 2019-01-16 RX ADMIN — INSULIN LISPRO 3 UNITS: 100 INJECTION, SOLUTION INTRAVENOUS; SUBCUTANEOUS at 21:15

## 2019-01-16 RX ADMIN — METOPROLOL SUCCINATE 75 MG: 25 TABLET, EXTENDED RELEASE ORAL at 08:50

## 2019-01-16 RX ADMIN — MELATONIN TAB 3 MG 6 MG: 3 TAB at 21:15

## 2019-01-16 RX ADMIN — INSULIN LISPRO 2 UNITS: 100 INJECTION, SOLUTION INTRAVENOUS; SUBCUTANEOUS at 16:07

## 2019-01-16 RX ADMIN — AMPICILLIN SODIUM 2000 MG: 2 INJECTION, POWDER, FOR SOLUTION INTRAMUSCULAR; INTRAVENOUS at 00:00

## 2019-01-16 RX ADMIN — INSULIN LISPRO 4 UNITS: 100 INJECTION, SOLUTION INTRAVENOUS; SUBCUTANEOUS at 12:13

## 2019-01-16 NOTE — PLAN OF CARE
Problem: PHYSICAL THERAPY ADULT  Goal: Performs mobility at highest level of function for planned discharge setting  See evaluation for individualized goals  Treatment/Interventions: Functional transfer training, LE strengthening/ROM, Elevations, Therapeutic exercise, Endurance training, Patient/family training, Equipment eval/education, Bed mobility, Gait training, Compensatory technique education, Continued evaluation, Spoke to nursing, Family  Equipment Recommended: Shantell Vides (possible RW)       See flowsheet documentation for full assessment, interventions and recommendations  Outcome: Progressing  Prognosis: Good  Problem List: Decreased strength, Decreased endurance, Impaired balance, Decreased mobility, Decreased safety awareness  Assessment: Pt in bed upon arrival   Pt offers no c/o pain  Pt agreeable to PT  Pt performed supine and seated b/l le arom exercises x 12 reps  PT performs bed mobility with supervision with use fo bedrails and head of bed elevated  PT performs sit to stand, stand to sit transfers from bed and from recliner with min assist x1 with verbal cues for handplacement  Pt perform toilet transfers with supervision with use of grab bar  Pt ambulate 15' x2 and 10' x1 with seated rest breaks between each gait trial   Pt  Is easily fatigued  Pt unable to tolerate sitting out of bed for prolonged periods of time due to discomfort in pt 's buttocks even with soft care cushion  Pt will benefit from continued inpt PT inorder to maximize functional mobility and independence  At this time recommend STR as pt is requiring min assist for transfers, ambulation and sit to supine and is only able to ambulate short distances of 10-15'  PT returned to bed at conclusion of PT session  Barriers to Discharge: Inaccessible home environment  Barriers to Discharge Comments: 2 story home  Recommendation: Short-term skilled PT (if home must achieve PT goals    )     PT - OK to Discharge: No (must achieve PT goals, Recomm STR at this time  )    See flowsheet documentation for full assessment

## 2019-01-16 NOTE — ASSESSMENT & PLAN NOTE
Klebsiella bacteremia likely due to urinary source  Appreciate ID help  Recommend IV Ampicillin through 1/22  I obtained consent for PICC and hopefully it will get placed tomorrow

## 2019-01-16 NOTE — PROGRESS NOTES
Progress Note - Infectious Disease   Júnior Currie 72 y o  male MRN: [id number]  Unit/Bed#: E5 -01 Encounter: [id number]      Impression/Recommendations:  1   Severe sepsis   POA:  Fever, leukopenia, and lactic acidosis   Due to # 2/3  Royston Burkitt with evolving infiltrate but no clinical signs of pneumonia   CT A/P negative   Hemodynamically stable and nontoxic   Clinically improving   Procalcitonin 91 49 > 25 82 > 13 42  Rec:  ? Continue antibiotics as below  ? Follow temperatures closely  ? Supportive care as per the primary service     2   Polymicrobial bacteremia   E  Coli/Actionomyces/Strep anginosis   Due to # 3  CT A/P otherwise negative   TTE negative  Repeat blood cultures negative  Rec:  ? Continue ampicillin for 14 days total of IV antibiotics through 1/22/19  ? OK from ID for PICC at any time  D/C after last dose of IV antibiotics  ? D/C planning for outpatient IV antibiotics     3   Polymicrobial UTI +/- epididymitis   With pyuria and urinary retention  E  Coli/Alpha Strep  Also Actinomyces in blood  Suspect unusual organisms due to ?uncircumcised state, ? Poor penile hygiene  Patient denies sexual activity  Rec:  ? Continue antibiotics as above     4   Urinary retention   Consider BPH   Status post Marshall  Failed voiding trial and catheter was replaced  Rec:  ? Continue Marshall catheter per urology  ? Follow urine output closely     5   DM with hyperglycemia   On insulin   Probable risk factor for above infection  Rec:  ? Continue management as per the primary service     The patient is stable from an ID standpoint  Antibiotics:  Ampicillin #6  Antibiotics #8    Subjective:  Patient seen on AM rounds  Denies fevers, chills, sweats, nausea, vomiting, or diarrhea  24 Hour Events:  No documented fevers, chills, sweats, nausea, vomiting, or diarrhea      Objective:  Vitals:  Temp:  [97 8 °F (36 6 °C)-98 3 °F (36 8 °C)] 98 3 °F (36 8 °C)  HR:  [75-78] 77  Resp:  [18] 18  BP: (118-136)/(56-78) 118/56  SpO2:  [90 %-98 %] 98 %  Temp (24hrs), Av °F (36 7 °C), Min:97 8 °F (36 6 °C), Max:98 3 °F (36 8 °C)  Current: Temperature: 98 3 °F (36 8 °C)    Physical Exam:   General:  No acute distress  Psychiatric:  Awake and alert  Pulmonary:  Normal respiratory excursion without accessory muscle use  Abdomen:  Soft, nontender  Extremities:  No edema  Skin:  No rashes    Lab Results:  I have personally reviewed pertinent labs  Results from last 7 days  Lab Units 19  0539 01/15/19  0453 19  0503   POTASSIUM mmol/L 3 2* 3 6 3 8   CHLORIDE mmol/L 98* 101 101   CO2 mmol/L 28 26 28   BUN mg/dL 8 8 11   CREATININE mg/dL 0 69 0 68 0 74   EGFR ml/min/1 73sq m 96 96 93   CALCIUM mg/dL 8 1* 8 0* 8 0*       Results from last 7 days  Lab Units 19  0539 01/15/19  0453 19  0503   WBC Thousand/uL 11 41* 11 04* 12 29*   HEMOGLOBIN g/dL 11 9* 11 4* 12 0   PLATELETS Thousands/uL 260 200 170       Results from last 7 days  Lab Units 19  1328   BLOOD CULTURE  No Growth at 24 hrs  No Growth at 24 hrs  Imaging Studies:   I have personally reviewed pertinent imaging study reports and images in PACS  EKG, Pathology, and Other Studies:   I have personally reviewed pertinent reports

## 2019-01-16 NOTE — PHYSICAL THERAPY NOTE
Physical Therapy Treatment Note       01/16/19 1055   Pain Assessment   Pain Assessment No/denies pain   Pain Score No Pain   Restrictions/Precautions   Other Precautions Fall Risk;O2;Multiple lines  (fuller cath)   General   Chart Reviewed Yes   Response to Previous Treatment Patient with no complaints from previous session  Family/Caregiver Present No   Cognition   Overall Cognitive Status WFL   Arousal/Participation Alert; Cooperative   Attention Within functional limits   Orientation Level Oriented X4   Memory Within functional limits   Following Commands Follows multistep commands without difficulty   Subjective   Subjective Pt offers no c/o pain  Pt reports feeling better  "I need my fuller bag emptied "     Bed Mobility   Supine to Sit 5  Supervision   Additional items Assist x 1;HOB elevated; Bedrails; Increased time required   Sit to Supine 4  Minimal assistance   Additional items Assist x 1; Increased time required;Verbal cues;LE management   Transfers   Sit to Stand 4  Minimal assistance   Additional items Assist x 1; Armrests; Increased time required;Verbal cues   Stand to Sit 4  Minimal assistance   Additional items Assist x 1; Armrests; Increased time required;Verbal cues   Toilet transfer 5  Supervision   Additional items Assist x 1; Increased time required;Standard toilet;Verbal cues  (grab bars)   Ambulation/Elevation   Gait pattern Foward flexed; Excessively slow;Decreased foot clearance   Gait Assistance 4  Minimal assist   Additional items Assist x 1;Verbal cues   Assistive Device Rolling walker   Distance 15'x2, 10'x1 seated rest breaks between each gait trial      Balance   Static Sitting Fair +   Dynamic Sitting Fair   Static Standing Fair -   Dynamic Standing Fair -   Ambulatory Poor +   Endurance Deficit   Endurance Deficit Yes   Endurance Deficit Description fatigue   Activity Tolerance   Activity Tolerance Patient limited by fatigue   Nurse Made Aware Kate Morin   Exercises   [employer] Supine;AROM; Bilateral  (x 12 reps  )   Heelslides Supine;AROM; Bilateral  (x 12 reps)   Hip Flexion Supine;AROM; Bilateral  (x 12 reps   )   Hip Abduction Supine;AROM; Bilateral  (x 12 reps   )   Hip Adduction Supine;AROM; Bilateral  (x 12 reps   )   Knee AROM Short Arc Quad Supine;AROM; Bilateral  (x 12 reps   )   Knee AROM Long Arc [employer]; Bilateral  (x 12 reps   )   Ankle Pumps Supine;AROM; Bilateral  (x 12 reps   )   Equipment Use   Comments PT draining foul smelling purulent drainage from penis  Pt 's Vincent Garcia aware  Pt gown changed x2  Assessment   Prognosis Good   Problem List Decreased strength;Decreased endurance; Impaired balance;Decreased mobility; Decreased safety awareness   Assessment Pt in bed upon arrival   Pt offers no c/o pain  Pt agreeable to PT  Pt performed supine and seated b/l le arom exercises x 12 reps  PT performs bed mobility with supervision with use fo bedrails and head of bed elevated  PT performs sit to stand, stand to sit transfers from bed and from recliner with min assist x1 with verbal cues for handplacement  Pt perform toilet transfers with supervision with use of grab bar  Pt ambulate 15' x2 and 10' x1 with seated rest breaks between each gait trial   Pt  Is easily fatigued  Pt unable to tolerate sitting out of bed for prolonged periods of time due to discomfort in pt 's buttocks even with soft care cushion  Pt will benefit from continued inpt PT inorder to maximize functional mobility and independence  At this time recommend STR as pt is requiring min assist for transfers, ambulation and sit to supine and is only able to ambulate short distances of 10-15'  PT returned to bed at conclusion of PT session  Goals   Patient Goals " To get better "     [facility] Expiration Date 01/25/19   Treatment Day 1   Plan   Treatment/Interventions Functional transfer training;LE strengthening/ROM; Elevations; Therapeutic exercise; Endurance training;Patient/family training;Equipment eval/education; Bed mobility;Gait training;Spoke to nursing   Progress Slow progress, decreased activity tolerance   PT Frequency (4-5x/week)   Recommendation   Recommendation Short-term skilled PT  (if home must achieve PT goals  )   PT - OK to Discharge No  (must achieve PT goals, Recomm STR at this time  )        01/16/19 1055   Pain Assessment   Pain Assessment No/denies pain   Pain Score No Pain   Restrictions/Precautions   Other Precautions Fall Risk;O2;Multiple lines  (fuller cath)   General   Chart Reviewed Yes   Response to Previous Treatment Patient with no complaints from previous session  Family/Caregiver Present No   Cognition   Overall Cognitive Status WFL   Arousal/Participation Alert; Cooperative   Attention Within functional limits   Orientation Level Oriented X4   Memory Within functional limits   Following Commands Follows multistep commands without difficulty   Subjective   Subjective Pt offers no c/o pain  Pt reports feeling better  "I need my fuller bag emptied "     Bed Mobility   Supine to Sit 5  Supervision   Additional items Assist x 1;HOB elevated; Bedrails; Increased time required   Sit to Supine 4  Minimal assistance   Additional items Assist x 1; Increased time required;Verbal cues;LE management   Transfers   Sit to Stand 4  Minimal assistance   Additional items Assist x 1; Armrests; Increased time required;Verbal cues   Stand to Sit 4  Minimal assistance   Additional items Assist x 1; Armrests; Increased time required;Verbal cues   Toilet transfer 5  Supervision   Additional items Assist x 1; Increased time required;Standard toilet;Verbal cues  (grab bars)   Ambulation/Elevation   Gait pattern Foward flexed; Excessively slow;Decreased foot clearance   Gait Assistance 4  Minimal assist   Additional items Assist x 1;Verbal cues   Assistive Device Rolling walker   Distance 15'x2, 10'x1 seated rest breaks between each gait trial      Balance   Static Sitting Fair +   Dynamic Sitting Fair   Static Standing Fair -   Dynamic Standing Fair -   Ambulatory Poor +   Endurance Deficit   Endurance Deficit Yes   Endurance Deficit Description fatigue   Activity Tolerance   Activity Tolerance Patient limited by fatigue   Nurse Made Aware Georgie Morin   Exercises   [employer]; Bilateral  (x 12 reps  )   Heelslides Supine;AROM; Bilateral  (x 12 reps)   Hip Flexion Supine;AROM; Bilateral  (x 12 reps   )   Hip Abduction Supine;AROM; Bilateral  (x 12 reps   )   Hip Adduction Supine;AROM; Bilateral  (x 12 reps   )   Knee AROM Short Arc Quad Supine;AROM; Bilateral  (x 12 reps   )   Knee AROM Long Arc [employer]; Bilateral  (x 12 reps   )   Ankle Pumps Supine;AROM; Bilateral  (x 12 reps   )   Equipment Use   Comments PT draining foul smelling purulent drainage from penis  Pt 's Jeraline Gold aware  Pt gown changed x2  Assessment   Prognosis Good   Problem List Decreased strength;Decreased endurance; Impaired balance;Decreased mobility; Decreased safety awareness   Assessment Pt in bed upon arrival   Pt offers no c/o pain  Pt agreeable to PT  Pt performed supine and seated b/l le arom exercises x 12 reps  PT performs bed mobility with supervision with use fo bedrails and head of bed elevated  PT performs sit to stand, stand to sit transfers from bed and from recliner with min assist x1 with verbal cues for handplacement  Pt perform toilet transfers with supervision with use of grab bar  Pt ambulate 15' x2 and 10' x1 with seated rest breaks between each gait trial   Pt  Is easily fatigued  Pt unable to tolerate sitting out of bed for prolonged periods of time due to discomfort in pt 's buttocks even with soft care cushion  Pt will benefit from continued inpt PT inorder to maximize functional mobility and independence  At this time recommend STR as pt is requiring min assist for transfers, ambulation and sit to supine and is only able to ambulate short distances of 10-15'    PT returned to bed at conclusion of PT session  Goals   Patient Goals " To get better "     [facility] Expiration Date 01/25/19   Treatment Day 1   Plan   Treatment/Interventions Functional transfer training;LE strengthening/ROM; Elevations; Therapeutic exercise; Endurance training;Patient/family training;Equipment eval/education; Bed mobility;Gait training;Spoke to nursing   Progress Slow progress, decreased activity tolerance   PT Frequency (4-5x/week)   Recommendation   Recommendation Short-term skilled PT  (if home must achieve PT goals    )   PT - OK to Discharge No  (must achieve PT goals, Recomm STR at this time  )       Jame Ventura, PTA

## 2019-01-16 NOTE — PROGRESS NOTES
Progress Note - Cardiology   Anderson Velez 72 y o  male MRN: [id number]  Unit/Bed#: E5 -01 Encounter: [id number]      Assessment:     1  Severe sepsis with E coli bacteremia likely urinary source  2  Elevated troponin-likely type 2 non ST elevation myocardial infarction in the setting of sepsis-peak troponin of 11 5  3  Coronary disease status post coronary bypass grafting and stenting in the past with ejection fraction currently 45% with severe hypokinesis of the basal to mid inferior and basal to mid inferolateral walls with mild to moderate aortic stenosis and mild-to-moderate mitral regurgitation  4  Diabetes  5  Hypertension  6  Tobacco use with COPD on imaging  7  Large noncalcified abdominal aortic plaque with areas of ulceration  8  NSVT     Discussion/Recommendations:    Continue IV Lasix for today and reassess tomorrow       On beta-blocker/aspirin/Plavix/statin      Subjective:  Feels overall well    Physical Exam:  GEN:  NAD  HEENT:  MMM, NCAT, pink conjunctiva, EOMI, nonicteric sclera  CV:  NO JVD/HJR, RR, NO M/R/G, +S1/S2, NO PARASTERNAL HEAVE/THRILL, NO LE EDEMA, NO HEPATIC SYSTOLIC PULSATION, WARM EXTREMITIES  RESP:  Faint wheeze  ABD:  SOFT, NT, NO GROSS ORGANOMEGALY        Vitals:   /56   Pulse 77   Temp 98 3 °F (36 8 °C) (Temporal)   Resp 18   Ht 5' 8" (1 727 m)   Wt 71 7 kg (158 lb 1 1 oz)   SpO2 98%   BMI 24 03 kg/m²   Vitals:    01/09/19 1123 01/10/19 0537   Weight: 70 7 kg (155 lb 13 8 oz) 71 7 kg (158 lb 1 1 oz)       Intake/Output Summary (Last 24 hours) at 01/16/19 0921  Last data filed at 01/16/19 0601   Gross per 24 hour   Intake                0 ml   Output             5350 ml   Net            -5350 ml     Lab Results:    Results from last 7 days  Lab Units 01/16/19  0539   WBC Thousand/uL 11 41*   HEMOGLOBIN g/dL 11 9*   HEMATOCRIT % 34 5*   PLATELETS Thousands/uL 260       Results from last 7 days  Lab Units 01/16/19  0539   POTASSIUM mmol/L 3 2*   CHLORIDE mmol/L 98* CO2 mmol/L 28   BUN mg/dL 8   CREATININE mg/dL 0 69   CALCIUM mg/dL 8 1*       Results from last 7 days  Lab Units 01/16/19  0539   POTASSIUM mmol/L 3 2*   CHLORIDE mmol/L 98*   CO2 mmol/L 28   BUN mg/dL 8   CREATININE mg/dL 0 69   CALCIUM mg/dL 8 1*             Medications:    Current Facility-Administered Medications:     acetaminophen (TYLENOL) tablet 650 mg, 650 mg, Oral, Q6H PRN, Cyndie Styles PA-C, 650 mg at 01/12/19 0709    ampicillin (OMNIPEN) 2,000 mg in sodium chloride 0 9 % 100 mL IVPB, 2,000 mg, Intravenous, Q6H, Yudi Mejia MD, Last Rate: 200 mL/hr at 01/16/19 0539, 2,000 mg at 01/16/19 0539    aspirin (ECOTRIN LOW STRENGTH) EC tablet 81 mg, 81 mg, Oral, Daily, Sarwat Serrano MD, 81 mg at 01/16/19 0849    atorvastatin (LIPITOR) tablet 80 mg, 80 mg, Oral, Daily With Kory San MD, 80 mg at 01/15/19 1725    clopidogrel (PLAVIX) tablet 75 mg, 75 mg, Oral, Daily, Jacey Juarez MD, 75 mg at 01/16/19 0849    furosemide (LASIX) injection 40 mg, 40 mg, Intravenous, BID (diuretic), Jacey Juarez MD, 40 mg at 01/16/19 0850    guaiFENesin (ROBITUSSIN) oral solution 200 mg, 200 mg, Oral, Q4H PRN, VARGAS Wynn, 200 mg at 01/13/19 0832    insulin glargine (LANTUS) subcutaneous injection 10 Units 0 1 mL, 10 Units, Subcutaneous, HS, Kay Hines MD, 10 Units at 01/15/19 2151    insulin lispro (HumaLOG) 100 units/mL subcutaneous injection 1-5 Units, 1-5 Units, Subcutaneous, TID AC, 2 Units at 01/16/19 0854 **AND** Fingerstick Glucose (POCT), , , TID AC, Kay Hines MD    insulin lispro (HumaLOG) 100 units/mL subcutaneous injection 1-5 Units, 1-5 Units, Subcutaneous, HS, Hever Selby MD, 2 Units at 01/15/19 2151    lisinopril (ZESTRIL) tablet 10 mg, 10 mg, Oral, Daily, Sarwat Serrano MD, 10 mg at 01/16/19 0849    melatonin tablet 6 mg, 6 mg, Oral, HS, VARGAS Wynn, 6 mg at 01/15/19 2150    melatonin tablet 6 mg, 6 mg, Oral, PRN, VARGAS Knapp, 6 mg at 01/15/19 0133    metoprolol succinate (TOPROL-XL) 24 hr tablet 75 mg, 75 mg, Oral, Daily, Juli Briones MD, 75 mg at 01/16/19 0850    nicotine (NICODERM CQ) 21 mg/24 hr TD 24 hr patch 1 patch, 1 patch, Transdermal, Daily, Josefina Musa PA-C, 1 patch at 01/16/19 0851    ondansetron (ZOFRAN) injection 4 mg, 4 mg, Intravenous, Q4H PRN, June Cordon Spatzer, CRNP, 4 mg at 01/12/19 2213    ranolazine (RANEXA) 12 hr tablet 500 mg, 500 mg, Oral, Q12H [address], Juli Briones MD, 500 mg at 01/16/19 0849    tamsulosin (FLOMAX) capsule 0 4 mg, 0 4 mg, Oral, Daily With Pearl Duverney, MD, 0 4 mg at 01/15/19 1725    Portions of the record may have been created with voice recognition software  Occasional wrong word or "sound a like" substitutions may have occurred due to the inherent limitations of voice recognition software  Read the chart carefully and recognize, using context, where substitutions have occurred

## 2019-01-16 NOTE — PROGRESS NOTES
Progress Note - Lupe Ren 1945, 72 y o  male MRN: [id number]    Unit/Bed#: E5 -01 Encounter: [id number]    Primary Care Provider: No primary care provider on file  Date and time admitted to hospital: 2019  2:30 AM        * SIRS (systemic inflammatory response syndrome) ([facility])   Assessment & Plan    Klebsiella bacteremia likely due to urinary source  Appreciate ID help  Recommend IV Ampicillin through   I obtained consent for PICC and hopefully it will get placed tomorrow           Subjective:   Feels well  No fever or chills        Objective:     Vitals:   Temp (24hrs), Av 1 °F (36 7 °C), Min:97 8 °F (36 6 °C), Max:98 3 °F (36 8 °C)    Temp:  [97 8 °F (36 6 °C)-98 3 °F (36 8 °C)] 98 1 °F (36 7 °C)  HR:  [75-88] 88  Resp:  [18] 18  BP: (108-136)/(56-78) 108/60  SpO2:  [90 %-98 %] 98 %  Body mass index is 24 03 kg/m²  Input and Output Summary (last 24 hours): Intake/Output Summary (Last 24 hours) at 19 1611  Last data filed at 19 1101   Gross per 24 hour   Intake              480 ml   Output             5700 ml   Net            -5220 ml       Physical Exam:     Physical Exam   HENT:   Head: Normocephalic and atraumatic  Eyes: Pupils are equal, round, and reactive to light  EOM are normal    Cardiovascular: Normal rate and regular rhythm  Exam reveals no gallop and no friction rub  No murmur heard  Pulmonary/Chest: Effort normal and breath sounds normal  He has no wheezes  He has no rales  Abdominal: Soft  Bowel sounds are normal  There is no tenderness  Musculoskeletal: He exhibits no edema  Nursing note and vitals reviewed              Additional Data:     Labs:      Results from last 7 days  Lab Units 19  0539   WBC Thousand/uL 11 41*   HEMOGLOBIN g/dL 11 9*   HEMATOCRIT % 34 5*   PLATELETS Thousands/uL 260   NEUTROS PCT % 75   LYMPHS PCT % 13*   MONOS PCT % 10   EOS PCT % 1       Results from last 7 days  Lab Units 19  0539   POTASSIUM mmol/L 3 2*   CHLORIDE mmol/L 98*   CO2 mmol/L 28   BUN mg/dL 8   CREATININE mg/dL 0 69   CALCIUM mg/dL 8 1*           Results from last 7 days  Lab Units 01/16/19  1549 01/16/19  1101 01/16/19  0818 01/15/19  2141 01/15/19  1547 01/15/19  1135 01/15/19  1103 01/15/19  0754 01/14/19  2051 01/14/19  1725 01/14/19  1236 01/14/19  0747   POC GLUCOSE mg/dl 286* 354* 252* 257* 233* 235* 238* 232* 244* 258* 242* 177*       Results from last 7 days  Lab Units 01/14/19  0503   HEMOGLOBIN A1C % 7 5*           * I Have Reviewed All Lab Data     Recent Cultures (last 7 days):       Results from last 7 days  Lab Units 01/14/19  1328   BLOOD CULTURE  No Growth at 24 hrs  No Growth at 24 hrs  Last 24 Hours Medication List:     Current Facility-Administered Medications:  acetaminophen 650 mg Oral Q6H PRN Danna Marquez PA-C    ampicillin 2,000 mg Intravenous Q6H Charmaine Carrasco MD Last Rate: 2,000 mg (01/16/19 1212)   aspirin 81 mg Oral Daily Aiden Norman MD    atorvastatin 80 mg Oral Daily With Nai Stovall MD    clopidogrel 75 mg Oral Daily Alex Urbina MD    guaiFENesin 200 mg Oral Q4H PRN VARGAS Gold    insulin glargine 10 Units Subcutaneous HS Azalia Milner MD    insulin lispro 1-5 Units Subcutaneous TID AC Azalia Milner MD    insulin lispro 1-5 Units Subcutaneous HS Azalia Milner MD    lisinopril 10 mg Oral Daily Aiden Norman MD    melatonin 6 mg Oral HS VARGAS Gold    melatonin 6 mg Oral PRN VARGAS Plunkett    metoprolol succinate 75 mg Oral Daily Aiden Norman MD    nicotine 1 patch Transdermal Daily Danna Marquez PA-C    ondansetron 4 mg Intravenous Q4H PRN Corinda Buoy Spatzer, CRNP    ranolazine 500 mg Oral Q12H [address] Aiden Norman MD    tamsulosin 0 4 mg Oral Daily With Cintia Mercedes MD          VTE Pharmacologic Prophylaxis:   Pharmacologic:   Current Length of Stay: 7 day(s)    Current Patient Status: Inpatient       Discharge Plan:       Code Status: Level 1 - Full Code           Today, Patient Was Seen By: Josias Jhaveri DO    ** Please Note: Dictation voice to text software may have been used in the creation of this document   **

## 2019-01-16 NOTE — PROGRESS NOTES
PICC consult for long term antibiotics  Chart reviewed  Okay per ID for placement  Pt currently has PIV   Plan for picc placement tomorrow 1/17/19 am Plan of care discussed with  Madonna Lindsey and notified of above

## 2019-01-17 LAB
ANION GAP SERPL CALCULATED.3IONS-SCNC: 9 MMOL/L (ref 4–13)
ATRIAL RATE: 91 BPM
BASOPHILS # BLD AUTO: 0.02 THOUSANDS/ΜL (ref 0–0.1)
BASOPHILS NFR BLD AUTO: 0 % (ref 0–1)
BUN SERPL-MCNC: 11 MG/DL (ref 5–25)
CALCIUM SERPL-MCNC: 9 MG/DL (ref 8.3–10.1)
CHLORIDE SERPL-SCNC: 99 MMOL/L (ref 100–108)
CO2 SERPL-SCNC: 26 MMOL/L (ref 21–32)
CREAT SERPL-MCNC: 0.71 MG/DL (ref 0.6–1.3)
EOSINOPHIL # BLD AUTO: 0.15 THOUSAND/ΜL (ref 0–0.61)
EOSINOPHIL NFR BLD AUTO: 2 % (ref 0–6)
ERYTHROCYTE [DISTWIDTH] IN BLOOD BY AUTOMATED COUNT: 13.4 % (ref 11.6–15.1)
GFR SERPL CREATININE-BSD FRML MDRD: 94 ML/MIN/1.73SQ M
GLUCOSE SERPL-MCNC: 219 MG/DL (ref 65–140)
GLUCOSE SERPL-MCNC: 227 MG/DL (ref 65–140)
GLUCOSE SERPL-MCNC: 238 MG/DL (ref 65–140)
GLUCOSE SERPL-MCNC: 265 MG/DL (ref 65–140)
GLUCOSE SERPL-MCNC: 295 MG/DL (ref 65–140)
GLUCOSE SERPL-MCNC: 303 MG/DL (ref 65–140)
GLUCOSE SERPL-MCNC: 337 MG/DL (ref 65–140)
HCT VFR BLD AUTO: 37.4 % (ref 36.5–49.3)
HGB BLD-MCNC: 12.4 G/DL (ref 12–17)
IMM GRANULOCYTES # BLD AUTO: 0.04 THOUSAND/UL (ref 0–0.2)
IMM GRANULOCYTES NFR BLD AUTO: 0 % (ref 0–2)
LYMPHOCYTES # BLD AUTO: 1.39 THOUSANDS/ΜL (ref 0.6–4.47)
LYMPHOCYTES NFR BLD AUTO: 14 % (ref 14–44)
MAGNESIUM SERPL-MCNC: 2 MG/DL (ref 1.6–2.6)
MCH RBC QN AUTO: 30.8 PG (ref 26.8–34.3)
MCHC RBC AUTO-ENTMCNC: 33.2 G/DL (ref 31.4–37.4)
MCV RBC AUTO: 93 FL (ref 82–98)
MONOCYTES # BLD AUTO: 0.91 THOUSAND/ΜL (ref 0.17–1.22)
MONOCYTES NFR BLD AUTO: 9 % (ref 4–12)
NEUTROPHILS # BLD AUTO: 7.65 THOUSANDS/ΜL (ref 1.85–7.62)
NEUTS SEG NFR BLD AUTO: 75 % (ref 43–75)
NRBC BLD AUTO-RTO: 0 /100 WBCS
P AXIS: 57 DEGREES
PLATELET # BLD AUTO: 317 THOUSANDS/UL (ref 149–390)
PMV BLD AUTO: 9.6 FL (ref 8.9–12.7)
POTASSIUM SERPL-SCNC: 3.4 MMOL/L (ref 3.5–5.3)
PR INTERVAL: 158 MS
QRS AXIS: -88 DEGREES
QRSD INTERVAL: 156 MS
QT INTERVAL: 436 MS
QTC INTERVAL: 536 MS
RBC # BLD AUTO: 4.02 MILLION/UL (ref 3.88–5.62)
SODIUM SERPL-SCNC: 134 MMOL/L (ref 136–145)
T WAVE AXIS: 77 DEGREES
VENTRICULAR RATE: 91 BPM
WBC # BLD AUTO: 10.16 THOUSAND/UL (ref 4.31–10.16)

## 2019-01-17 PROCEDURE — 93010 ELECTROCARDIOGRAM REPORT: CPT | Performed by: INTERNAL MEDICINE

## 2019-01-17 PROCEDURE — 02HV33Z INSERTION OF INFUSION DEVICE INTO SUPERIOR VENA CAVA, PERCUTANEOUS APPROACH: ICD-10-PCS | Performed by: HOSPITALIST

## 2019-01-17 PROCEDURE — 99232 SBSQ HOSP IP/OBS MODERATE 35: CPT | Performed by: HOSPITALIST

## 2019-01-17 PROCEDURE — 83735 ASSAY OF MAGNESIUM: CPT | Performed by: HOSPITALIST

## 2019-01-17 PROCEDURE — C1751 CATH, INF, PER/CENT/MIDLINE: HCPCS

## 2019-01-17 PROCEDURE — 36569 INSJ PICC 5 YR+ W/O IMAGING: CPT

## 2019-01-17 PROCEDURE — 80048 BASIC METABOLIC PNL TOTAL CA: CPT | Performed by: HOSPITALIST

## 2019-01-17 PROCEDURE — 85025 COMPLETE CBC W/AUTO DIFF WBC: CPT | Performed by: HOSPITALIST

## 2019-01-17 PROCEDURE — 99232 SBSQ HOSP IP/OBS MODERATE 35: CPT | Performed by: INTERNAL MEDICINE

## 2019-01-17 PROCEDURE — 82948 REAGENT STRIP/BLOOD GLUCOSE: CPT

## 2019-01-17 RX ADMIN — ASPIRIN 81 MG: 81 TABLET, COATED ORAL at 08:49

## 2019-01-17 RX ADMIN — INSULIN LISPRO 3 UNITS: 100 INJECTION, SOLUTION INTRAVENOUS; SUBCUTANEOUS at 16:55

## 2019-01-17 RX ADMIN — CLOPIDOGREL BISULFATE 75 MG: 75 TABLET ORAL at 08:49

## 2019-01-17 RX ADMIN — RANOLAZINE 500 MG: 500 TABLET, FILM COATED, EXTENDED RELEASE ORAL at 21:26

## 2019-01-17 RX ADMIN — AMPICILLIN SODIUM 2000 MG: 2 INJECTION, POWDER, FOR SOLUTION INTRAMUSCULAR; INTRAVENOUS at 23:13

## 2019-01-17 RX ADMIN — MELATONIN TAB 3 MG 6 MG: 3 TAB at 21:26

## 2019-01-17 RX ADMIN — NICOTINE 1 PATCH: 21 PATCH, EXTENDED RELEASE TRANSDERMAL at 08:50

## 2019-01-17 RX ADMIN — LISINOPRIL 10 MG: 10 TABLET ORAL at 08:49

## 2019-01-17 RX ADMIN — METOPROLOL SUCCINATE 75 MG: 25 TABLET, EXTENDED RELEASE ORAL at 08:49

## 2019-01-17 RX ADMIN — ATORVASTATIN CALCIUM 80 MG: 80 TABLET, FILM COATED ORAL at 16:55

## 2019-01-17 RX ADMIN — INSULIN LISPRO 1 UNITS: 100 INJECTION, SOLUTION INTRAVENOUS; SUBCUTANEOUS at 08:49

## 2019-01-17 RX ADMIN — AMPICILLIN SODIUM 2000 MG: 2 INJECTION, POWDER, FOR SOLUTION INTRAMUSCULAR; INTRAVENOUS at 05:38

## 2019-01-17 RX ADMIN — INSULIN LISPRO 3 UNITS: 100 INJECTION, SOLUTION INTRAVENOUS; SUBCUTANEOUS at 13:30

## 2019-01-17 RX ADMIN — AMPICILLIN SODIUM 2000 MG: 2 INJECTION, POWDER, FOR SOLUTION INTRAMUSCULAR; INTRAVENOUS at 16:55

## 2019-01-17 RX ADMIN — INSULIN LISPRO 2 UNITS: 100 INJECTION, SOLUTION INTRAVENOUS; SUBCUTANEOUS at 21:26

## 2019-01-17 RX ADMIN — INSULIN GLARGINE 10 UNITS: 100 INJECTION, SOLUTION SUBCUTANEOUS at 21:26

## 2019-01-17 RX ADMIN — TAMSULOSIN HYDROCHLORIDE 0.4 MG: 0.4 CAPSULE ORAL at 16:55

## 2019-01-17 RX ADMIN — AMPICILLIN SODIUM 2000 MG: 2 INJECTION, POWDER, FOR SOLUTION INTRAMUSCULAR; INTRAVENOUS at 13:33

## 2019-01-17 RX ADMIN — RANOLAZINE 500 MG: 500 TABLET, FILM COATED, EXTENDED RELEASE ORAL at 08:49

## 2019-01-17 NOTE — SOCIAL WORK
Followed-up and met with pt and his daughter  Discussed SNF recommendations and daughter is agreeable  List of facilities provided and she requested for referrals to be made to [facility]  Referrals made  [facility] and [facility] have no availabilty  [facility] does not participate with pt's insurance  Contacted pt's daughter and a message was left with updated information  Will continue to follow-up

## 2019-01-17 NOTE — UTILIZATION REVIEW
Continued Stay Review    Date:    1/17/2019    Vital Signs: /72 (BP Location: Right arm)   Pulse 71   Temp 97 9 °F (36 6 °C) (Temporal)   Resp 18   Ht 5' 8" (1 727 m)   Wt 71 7 kg (158 lb 1 1 oz)   SpO2 96%   BMI 24 03 kg/m²      Assessment/Plan:   SIRS (systemic inflammatory response syndrome) (HCC)   Assessment & Plan     Klebsiella bacteremia likely due to urinary source  Appreciate ID help  Recommend IV Ampicillin through 1/22   PICC  In  1/17    Medications:   Scheduled Meds:   Current Facility-Administered Medications:  acetaminophen 650 mg Oral Q6H PRN Ant Lind PA-C    ampicillin 2,000 mg Intravenous Q6H Paco Man MD Last Rate: 2,000 mg (01/17/19 0538)   aspirin 81 mg Oral Daily Gustavo Esquivel MD    atorvastatin 80 mg Oral Daily With Angi Olivier MD    clopidogrel 75 mg Oral Daily Iesha Watters MD    guaiFENesin 200 mg Oral Q4H PRN VARGAS Villar    insulin glargine 10 Units Subcutaneous HS Tl Craft MD    insulin lispro 1-5 Units Subcutaneous TID AC Kay Hines MD    insulin lispro 1-5 Units Subcutaneous HS Tl Craft MD    lisinopril 10 mg Oral Daily Gustavo Esquivel MD    melatonin 6 mg Oral HS VARGAS Villar    melatonin 6 mg Oral PRN VARGAS Rosario    metoprolol succinate 75 mg Oral Daily Gustavo Esquivel MD    nicotine 1 patch Transdermal Daily Ant Lind PA-C    ondansetron 4 mg Intravenous Q4H PRN Dominick Bell Spatzer, CRNP    ranolazine 500 mg Oral Q12H [facility] Gustavo Esquivel MD    tamsulosin 0 4 mg Oral Daily With Luma Rivera MD      Continuous Infusions:    PRN Meds:   acetaminophen    guaiFENesin    melatonin    ondansetron     Pertinent Labs/Diagnostic Results:    NA  134  K  3 4  Chloride    99      Per  Cardiology  1/17  Urosepsis  Improved  Management as per Infectious Disease and Internal Medicine  2  CAD status post coronary artery bypass grafting in 2009    Cardiac catheterization in 2013 showed severe disease of the native circumflex but with patent grafts  Recent stress testing did show ischemia  Patient with stable angina prior to admission  3  Nontransmural myocardial infarction type 2 related to severe sepsis superimposed upon CAD  4  Mild-to-moderate aortic stenosis  5  Diabetes mellitus  6  Hyperlipidemia-on statin therapy  7  Hypertension  Well controlled on beta-blocker and ACE-inhibitor  8  Severe PVD status post intervention of the lower extremities in the past  9  Ongoing smoking with COPD  10  Mild left ventricular systolic dysfunction due to CAD  11  Acute systolic heart failure postoperatively  Patient has received Lasix and is now off of this and appears improved       Discharge Plan:    SNF    [address] [facility]  Utilization Review Department  Phone: [phone]; Fax [phone]  [email or web address]  ATTENTION: Please call with any questions or concerns to [phone]  and carefully listen to the prompts so that you are directed to the right person  Send all requests for admission clinical reviews, approved or denied determinations and any other requests to fax [phone]   All voicemails are confidential

## 2019-01-17 NOTE — PROGRESS NOTES
Progress Note - Ramu Cabral 1945, 72 y o  male MRN: [id number]    Unit/Bed#: E5 -01 Encounter: [id number]    Primary Care Provider: No primary care provider on file  Date and time admitted to hospital: 2019  2:30 AM        * SIRS (systemic inflammatory response syndrome) ([address] )   Assessment & Plan    Klebsiella bacteremia likely due to urinary source  Appreciate ID help  Recommend IV Ampicillin through   I obtained consent for PICC and hopefully it will get placed tomorrow     Acute cystitis without hematuria   Assessment & Plan    Likely caused the Klebsiella bacteremia  Planning on IV Ampiciilin through            Subjective:   Feels better  He did agree to PICC line and STR  No fever or chills      Objective:     Vitals:   Temp (24hrs), Av 8 °F (36 6 °C), Min:97 6 °F (36 4 °C), Max:97 9 °F (36 6 °C)    Temp:  [97 6 °F (36 4 °C)-97 9 °F (36 6 °C)] 97 9 °F (36 6 °C)  HR:  [68-71] 71  Resp:  [18] 18  BP: (130-141)/(65-72) 130/72  SpO2:  [96 %-97 %] 96 %  Body mass index is 24 03 kg/m²  Input and Output Summary (last 24 hours): Intake/Output Summary (Last 24 hours) at 19 1518  Last data filed at 19 1333   Gross per 24 hour   Intake              100 ml   Output             3600 ml   Net            -3500 ml       Physical Exam:     Physical Exam   HENT:   Head: Normocephalic and atraumatic  Eyes: Pupils are equal, round, and reactive to light  EOM are normal    Cardiovascular: Normal rate and regular rhythm  Exam reveals no gallop and no friction rub  No murmur heard  Pulmonary/Chest: Effort normal and breath sounds normal  He has no wheezes  He has no rales  Abdominal: Soft  Bowel sounds are normal  There is no tenderness  Musculoskeletal: He exhibits no edema  Nursing note and vitals reviewed              Additional Data:     Labs:      Results from last 7 days  Lab Units 19  0507   WBC Thousand/uL 10 16   HEMOGLOBIN g/dL 12 4   HEMATOCRIT % 37 4 PLATELETS Thousands/uL 317   NEUTROS PCT % 75   LYMPHS PCT % 14   MONOS PCT % 9   EOS PCT % 2       Results from last 7 days  Lab Units 01/17/19  0507   POTASSIUM mmol/L 3 4*   CHLORIDE mmol/L 99*   CO2 mmol/L 26   BUN mg/dL 11   CREATININE mg/dL 0 71   CALCIUM mg/dL 9 0           Results from last 7 days  Lab Units 01/17/19  1329 01/17/19  0748 01/17/19  0636 01/16/19  2043 01/16/19  1549 01/16/19  1101 01/16/19  0818 01/15/19  2141 01/15/19  1547 01/15/19  1135 01/15/19  1103 01/15/19  0754   POC GLUCOSE mg/dl 337* 219* 227* 328* 286* 354* 252* 257* 233* 235* 238* 232*       Results from last 7 days  Lab Units 01/14/19  0503   HEMOGLOBIN A1C % 7 5*           * I Have Reviewed All Lab Data     Recent Cultures (last 7 days):       Results from last 7 days  Lab Units 01/14/19  1328   BLOOD CULTURE  No Growth at 48 hrs  No Growth at 48 hrs           Last 24 Hours Medication List:     Current Facility-Administered Medications:  acetaminophen 650 mg Oral Q6H PRN Kathia Belcher PA-C    ampicillin 2,000 mg Intravenous Q6H Portillo Mallory MD Last Rate: 2,000 mg (01/17/19 1333)   aspirin 81 mg Oral Daily Genevieve Rai MD    atorvastatin 80 mg Oral Daily With Maynor Hyatt MD    clopidogrel 75 mg Oral Daily Aki Vela MD    guaiFENesin 200 mg Oral Q4H PRN VARGAS Morton    insulin glargine 10 Units Subcutaneous HS Emeli Walker MD    insulin lispro 1-5 Units Subcutaneous TID AC Emeli Walker MD    insulin lispro 1-5 Units Subcutaneous HS Emeli Walker MD    lisinopril 10 mg Oral Daily Genevieve Rai MD    melatonin 6 mg Oral HS VARGAS Morton    melatonin 6 mg Oral PRN Thurlow Cockayne, CRNP    metoprolol succinate 75 mg Oral Daily Genevieve Rai MD    nicotine 1 patch Transdermal Daily Kathia Belcher PA-C    ondansetron 4 mg Intravenous Q4H PRN Maceo Peto Spatzer, CRNP    ranolazine 500 mg Oral Q12H [facility] Genevieve Rai MD    tamsulosin 0 4 mg Oral Daily With Stuart Moscoso MD          VTE Pharmacologic Prophylaxis:   Pharmacologic:   Current Length of Stay: 8 day(s)    Current Patient Status: Inpatient       Discharge Plan: looking for STR    Code Status: Level 1 - Full Code           Today, Patient Was Seen By: Radha Painting DO    ** Please Note: Dictation voice to text software may have been used in the creation of this document   **

## 2019-01-17 NOTE — SOCIAL WORK
Followed-up with pt regarding SNF recommendations for PT and also pt will be starting IV abx  Pt at this time is declining SNF and prefers to return home with homecare services if possible  Explained about IV abx and if need to continue inquired if pt would be able to manage at home since VNA does not come out daily  Pt will discuss it with his daughter  Will continue to follow-up

## 2019-01-17 NOTE — UTILIZATION REVIEW
Jacqueline Roe RN Registered Nurse Signed   Utilization Review Date of Service: 1/9/2019  3:20 PM         []Hide copied text  Initial Clinical Review     Admission: Date/Time/Statement: 1/9/19 @ 0432         Orders Placed This Encounter   Procedures    Inpatient Admission (expected length of stay for this patient is greater than two midnights)       Standing Status:   Standing       Number of Occurrences:   1       Order Specific Question:   Admitting Physician       Answer:   Hermann Huitron [[id number]]       Order Specific Question:   Level of Care       Answer:   Med Surg [[id number]]       Order Specific Question:   Estimated length of stay       Answer:   More than 2 Midnights       Order Specific Question:   Certification       Answer:   I certify that inpatient services are medically necessary for this patient for a duration of greater than two midnights  See H&P and MD Progress Notes for additional information about the patient's course of treatment       ED: Date/Time/Mode of Arrival:             ED Arrival Information      Expected Arrival Acuity Means of Arrival Escorted By Service Admission Type     - 1/9/2019 02:29 Emergent Ambulance [facility] Hospitalist Emergency     Arrival Complaint     medical problem          Chief Complaint:        Chief Complaint   Patient presents with    Shortness of Breath       Pt brought in via ems from home with sob that started tonight  Pt has cough, nasal congestion, fever at home        History of Illness: MARVIN SACKS is a 72 y  o  male comes to the emergency department from home via EMS reporting that he has shortness of breath and difficulty breathing   Has no chest pain   He denies swelling of the lower extremities   He denies abdominal pain   He does report to some urinary retention type symptoms and is not able to start voiding when he wants to at times    He generally feels weak   He is a nausea, vomiting, diarrhea, constipation      Denies: Chest pain, Shortness of Breath, Nausea, Vomiting, Diarrhea, Dysuria        ED Vital Signs:            ED Triage Vitals   Temperature Pulse Respirations Blood Pressure SpO2   01/09/19 0233 01/09/19 0233 01/09/19 0233 01/09/19 0233 01/09/19 0233   (!) 102 8 °F (39 3 °C) 105 (!) 32 147/65 92 %       Temp Source Heart Rate Source Patient Position - Orthostatic VS BP Location FiO2 (%)   01/09/19 0233 01/09/19 0233 01/09/19 0455 01/09/19 0233 --   Oral Monitor Lying Right arm         Pain Score           01/09/19 0455           No Pain                Wt Readings from Last 1 Encounters:   01/09/19 70 7 kg (155 lb 13 8 oz)      Vital Signs (abnormal): above     Pertinent Labs/Diagnostic Test Results:   Lactic  Acid   4 7  U/A   Mod  Blood  1+ protein    >  1000   Glucose    sm leukocyte  BNP   2,206  Troponin  0 13    Chloride   95  AG  14  WBC   2 62  Ct  Chest:     Motion limited examination   No evidence of pulmonary embolism to the proximal segmental level  2   Emphysematous changes of lungs with linear atelectasis in the right lower lobe   No focal consolidation  3   A small calcified left hilar lymph node is seen which may be due to sequela of prior granulomatous disease    4   Large noncalcified plaques are seen within the proximal abdominal aorta with areas of ulceration      ED Treatment:              Medication Administration from 01/09/2019 0229 to 01/09/2019 0530        Date/Time Order Dose Route Action Action by Comments       01/09/2019 0347 sodium chloride 0 9 % bolus 1,000 mL 0 mL Intravenous Stopped Trena Patel RN         01/09/2019 0240 sodium chloride 0 9 % bolus 1,000 mL 1,000 mL Intravenous [address] [facility], MAGGIE         01/09/2019 0245 acetaminophen (TYLENOL) rectal suppository 650 mg 650 mg Rectal Given Trena Patel RN         01/09/2019 0313 iohexol (OMNIPAQUE) 350 MG/ML injection (SINGLE-DOSE) 85 mL 85 mL Intravenous Given Yobani Pool         01/09/2019 0401 ceftriaxone (ROCEPHIN) 1 g/50 mL in dextrose IVPB 0 mg Intravenous Stopped Andree Morales RN         01/09/2019 0331 ceftriaxone (ROCEPHIN) 1 g/50 mL in dextrose IVPB 1,000 mg Intravenous [address] Andree Morales RN         01/09/2019 0428 sodium chloride 0 9 % bolus 1,000 mL 0 mL Intravenous Stopped Andree Morales RN         01/09/2019 0338 sodium chloride 0 9 % bolus 1,000 mL 1,000 mL Intravenous [address]juan Monterroso RN         01/09/2019 0428 ibuprofen (MOTRIN) tablet 600 mg 600 mg Oral Given Sandeep Monterroso RN            Past Medical/Surgical History:         Active Ambulatory Problems     Diagnosis Date Noted    No Active Ambulatory Problems           Resolved Ambulatory Problems     Diagnosis Date Noted    No Resolved Ambulatory Problems           Past Medical History:   Diagnosis Date    Diabetes mellitus ([facility] )      Hypertension        Admitting Diagnosis: UTI (urinary tract infection) [N39 0]  Fatigue [R53 83]  SOB (shortness of breath) [R06 02]  Elevated troponin [R74 8]  Fever [R50 9]  Sepsis ([facility] ) [A41 9]  Age/Sex: 72 y o  male      Assessment/Plan:       SIRS (systemic inflammatory response syndrome) (HCC)   Assessment & Plan     · Patient febrile, with tachycardia and source of urine  · Will continue IV ceftriaxone  · Elevated lactic acid and will gently hydrate and recheck      Acute cystitis without hematuria   Assessment & Plan     · Continue ceftriaxone      Shortness of breath   Assessment & Plan     · BNP 2,206 on admission  · CTA to rule PE of the chest does not identify signs of volume overload  · Will monitor as clinical picture improves          Elevated troponin I level   Assessment & Plan     · Initial troponin 0 13  · No reported chest pain and represents likely noncardiac elevation of troponin      Lactic acidosis   Assessment & Plan     · Initial was 4 7  · Will gently hydrate and recheck in 2 hr      Tobacco abuse   Assessment & Plan     · Patient reports smoking 1 pack of cigarettes per day  · Encourage total cessation and will provide nicotine patch during hospitalization      Anticipated Length of Stay will be: MORE THAN 2 (TWO) Midnights:            Admission Orders:   IP  1/9  @    0432  Scheduled Meds:   Current Facility-Administered Medications:  acetaminophen 650 mg Oral Q6H PRN Lester Houser PA-C     cefepime 1,000 mg Intravenous Q12H Slava Bush MD Last Rate: Stopped (01/09/19 1119)   heparin (porcine) 5,000 Units Subcutaneous Q8H [address] Aristeo Mckinney PA-C     insulin lispro 1-5 Units Subcutaneous TID AC Kay Hines MD     insulin lispro 1-5 Units Subcutaneous HS Kay Hines MD     lactated ringers 125 mL/hr Intravenous Continuous Slava Bush MD Last Rate: 125 mL/hr (01/09/19 1444)   nicotine 1 patch Transdermal Daily Lester Houser PA-C        Continuous Infusions:   lactated ringers 125 mL/hr Last Rate: 125 mL/hr (01/09/19 1444)      PRN Meds:   acetaminophen      Tele  Cardiac  Diet  Serial troponin  Cons  Cardiology     Per  Cardiology  Consult:    1  SIRS-Probable sepsis:  Ongoing workup with undefined source thus far  2  Elevated troponin/non STEMI - probably type 2 secondary to demand ischemia in the setting of sepsis and underlying CAD  3  CAD, remote CABG x2 (1990s):  Details of CABG unclear   Current CT scan suggest significant coronary calcifications  4  Right bundle branch block  5  Possible cardiomyopathy:  Patient offers suggestion of LV dysfunction though details unknown and no records currently available     · Will request records of [facility] for insight into the patient's cardiac history  · Patient reports having had an echocardiogram in the last few months at the office of his usual cardiologist - as such we will defer immediately repeating one unless the patient should have some other sign of ACS or hemodynamic instability    · In the absence of chest pain and ECG changes would not heparinize at this time   Continue aspirin   No beta-blocker in the setting of hypotension  · Check ECG now and p r n  for any complaint of chest discomfort  · Pt to be transferred to higher level of care (stepdown ICU) in light of hypotension, lactic acidosis        [address] Utilization Review Department  Phone: [phone]; Fax [phone]  [email or web address]  ATTENTION: Please call with any questions or concerns to [phone]  and carefully listen to the prompts so that you are directed to the right person  Send all requests for admission clinical reviews, approved or denied determinations and any other requests to fax [phone]   All voicemails are confidential            Carey Garrison, RN Registered Nurse Signed   Utilization Review Date of Service: 1/13/2019 12:24 PM         []Hide copied text  Continued Stay Review     Date:    1/13/2019     Vital Signs: /67 (BP Location: Right arm)   Pulse 72   Temp 97 8 °F (36 6 °C) (Temporal)   Resp 20   Ht 5' 8" (1 727 m)   Wt 71 7 kg (158 lb 1 1 oz)   SpO2 99%   BMI 24 03 kg/m²       Assessment/Plan: severe sepsis on presentation-secondary to 2-patient presented with fever, leukopenia, lactic acidosis with blood cultures growing E coli   Likely urinary source   On IV ampicillin -changed from IV ceftriaxone   Plan to continue 7 days antibiotics through 01/15/2019   X-ray chest suggestive of infiltrate right lower lobe however patient appears stable from a respiratory point of view    Procalcitonin elevated at 90 which is now trended down to 13   Will repeat procalcitonin tomorrow  Loni Olea continue supportive care and monitoring for any recurrence of temperature or elevation of WBC     2-E coli bacteremia secondary to 3-genitourinary source   Urine culture growing E coli   Pansensitive   Patient  complaining of scrotal pain  ultrasound suggestive of right-sided epididymitis however no evidence of epididymitis per Urology   Id input appreciated   Continue IV ampicillin   See plan above     3-urinary retention on admission status post Marshall placement   CT revealing prostatomegaly   Marshall was discontinued on 01/12/2018 at patient insistence however patient could not void subsequently hence Marshall was reinserted   Could trial another voiding trial of Marshall prior to discharge   Started tamsulosin   Will follow up with Urology as outpatient      4-diabetes mellitus type 2 on insulin   Patient on Lantus 40 units in the afternoon--this has been changed to a sliding scale coverage   Will add Lantus 10 units at night   Will check glycosylated hemoglobin     5-elevated troponin-likely non ST elevation type 2 secondary to sepsis   Continue on metoprolol and aspirin   No chest pain currently   Patient was also on imdur at home      6-coronary artery disease status post stent and CABG   Was on aspirin and Plavix at home      7-essential hypertension   Was on lisinopril 10 mg daily at home   Blood pressure currently low normal hence will hold off of lisinopril   -coronary artery disease status post CABG with EF 45% with hypokinesis of the basal to mid inferior lead atrial walls  --appears stable at present         Continue IV antibiotics through 01/15/2019  Monitor for any elevated WBC or recurrence of fever  PT eval     Medications:   Scheduled Meds:   Current Facility-Administered Medications:  acetaminophen 650 mg Oral Q6H PRN Stephanie Levin PA-C     ampicillin 2,000 mg Intravenous Q6H Piero Barry MD Last Rate: 2,000 mg (01/14/19 0521)   aspirin 81 mg Oral Daily Mildred Waller MD     atorvastatin 80 mg Oral Daily With Cheryl Jean Baptiste MD     guaiFENesin 200 mg Oral Q4H PRN VARGAS Gomes     insulin glargine 10 Units Subcutaneous HS Shan David MD     insulin lispro 1-5 Units Subcutaneous TID AC Kay Hines MD     insulin lispro 1-5 Units Subcutaneous HS Shan David MD     lisinopril 10 mg Oral Daily Mildred Waller MD     melatonin 6 mg Oral HS Anastasiya Green VARGAS     metoprolol succinate 75 mg Oral Daily Evelyn Marlow MD     nicotine 1 patch Transdermal Daily Andrei Bliss PA-C     ondansetron 4 mg Intravenous Q4H PRN Jeris Kiang Spatzer, CRNP     ranolazine 500 mg Oral Q12H [facility] Evelyn Marlow MD     tamsulosin 0 4 mg Oral Daily With Jael Srivastava MD        Continuous Infusions:    PRN Meds:   acetaminophen    guaiFENesin    ondansetron      Pertinent Labs/Diagnostic Results:    WBC   11 91  Platelets  348     Discharge Plan:    home

## 2019-01-17 NOTE — PROGRESS NOTES
Progress Note - Infectious Disease   Elidia Kelly 72 y o  male MRN: [id number]  Unit/Bed#: E5 -01 Encounter: [id number]      Impression/Recommendations:  1   Severe sepsis   POA:  Fever, leukopenia, and lactic acidosis   Due to # 2/3  Leopoldo Mutter with evolving infiltrate but no clinical signs of pneumonia   CT A/P negative   Hemodynamically stable and nontoxic   Clinically improving   Procalcitonin 91 49 > 25 82 > 13 42  Rec:  ? Continue antibiotics as below  ? Follow temperatures closely  ? Supportive care as per the primary service     2   Polymicrobial bacteremia   E  Coli/Actionomyces/Strep anginosis   Due to # 3  CT A/P otherwise negative   TTE negative  Repeat blood cultures negative  Rec:  ? Continue ampicillin for 14 days total of IV antibiotics through 1/22/19  ? OK from ID for PICC at any time  D/C after last dose of IV antibiotics  ? D/C planning for SNF at D/C     3   Polymicrobial UTI +/- epididymitis   With pyuria and urinary retention  E  Coli/Alpha Strep   Also Actinomyces in blood   Suspect unusual organisms due to ?uncircumcised state, ? Poor penile hygiene   Patient denies sexual activity  Rec:  ? Continue antibiotics as above     4   Urinary retention   Consider BPH   Status post Marshall   Failed voiding trial and catheter was replaced  Rec:  ? Continue Marshall catheter per urology  ? Follow urine output closely     5   DM with hyperglycemia   On insulin   Probable risk factor for above infection  Rec:  ? Continue management as per the primary service     Discussed in detail with the patient, his daughter at the bedside, and Dr Jerome Kumar  The patient is stable from an ID standpoint  We will sign off for now  Please call with new questions      Antibiotics:  Ampicillin #7  Antibiotics #9    Subjective:  Patient seen on PM rounds  Denies fevers, chills, sweats, nausea, vomiting, or diarrhea  24 Hour Events:  Got PICC    Denies fevers, chills, sweats, nausea, vomiting, or diarrhea  Objective:  Vitals:  Temp:  [97 6 °F (36 4 °C)-98 1 °F (36 7 °C)] 97 9 °F (36 6 °C)  HR:  [68-88] 71  Resp:  [18] 18  BP: (108-141)/(60-72) 130/72  SpO2:  [96 %-98 %] 96 %  Temp (24hrs), Av 9 °F (36 6 °C), Min:97 6 °F (36 4 °C), Max:98 1 °F (36 7 °C)  Current: Temperature: 97 9 °F (36 6 °C)    Physical Exam:   General:  No acute distress  Psychiatric:  Awake and alert  Pulmonary:  Normal respiratory excursion without accessory muscle use  Abdomen:  Soft, nontender  Extremities:  No edema  Skin:  No rashes  :  Clear yellow urine in Marshall  No bleeding or malodor today  Lab Results:  I have personally reviewed pertinent labs  Results from last 7 days  Lab Units 19  0507 19  0539 01/15/19  0453   POTASSIUM mmol/L 3 4* 3 2* 3 6   CHLORIDE mmol/L 99* 98* 101   CO2 mmol/L 26 28 26   BUN mg/dL 11 8 8   CREATININE mg/dL 0 71 0 69 0 68   EGFR ml/min/1 73sq m 94 96 96   CALCIUM mg/dL 9 0 8 1* 8 0*       Results from last 7 days  Lab Units 19  0507 19  0539 01/15/19  0453   WBC Thousand/uL 10 16 11 41* 11 04*   HEMOGLOBIN g/dL 12 4 11 9* 11 4*   PLATELETS Thousands/uL 317 260 200       Results from last 7 days  Lab Units 19  1328   BLOOD CULTURE  No Growth at 48 hrs  No Growth at 48 hrs  Imaging Studies:   I have personally reviewed pertinent imaging study reports and images in PACS  EKG, Pathology, and Other Studies:   I have personally reviewed pertinent reports

## 2019-01-17 NOTE — PROCEDURES
Insert PICC line  Date/Time: 1/17/2019 1:10 PM  Performed by: Lawanda Phelan by: Pauline Pham     Patient location:  Bedside  Consent:     Consent obtained:  Written    Consent given by:  Patient    Procedural risks discussed: consent obtained by physician  [place] protocol:     Procedure explained and questions answered to patient or proxy's satisfaction: yes      Relevant documents present and verified: yes      Test results available and properly labeled: yes      Radiology Images displayed and confirmed  If images not available, report reviewed: yes      Required blood products, implants, devices, and special equipment available: yes      Site/side marked: yes      Immediately prior to procedure, a time out was called: yes      Patient identity confirmed:  Verbally with patient, arm band, provided demographic data and hospital-assigned identification number  Pre-procedure details:     Hand hygiene: Hand hygiene performed prior to insertion      Sterile barrier technique: All elements of maximal sterile technique followed      Skin preparation:  ChloraPrep    Skin preparation agent: Skin preparation agent completely dried prior to procedure    Indications:     PICC line indications: long term antibiotics    Anesthesia (see MAR for exact dosages):      Anesthesia method:  Local infiltration (3ml)    Local anesthetic:  Lidocaine 1% w/o epi  Procedure details:     Location:  Basilic    Vessel type: vein      Laterality:  Right    Approach: percutaneous technique used      Patient position:  Flat    Procedural supplies:  Double lumen    Catheter size:  5 Fr    Landmarks identified: yes      Ultrasound guidance: yes      Sterile ultrasound techniques: Sterile gel and sterile probe covers were used      Number of attempts:  1    Successful placement: yes      Vessel of catheter tip end:  Sherlock 3CG confirmed (sherlock 3cg procedure record confirmed placement sent to medical records)    Total catheter length (cm):  39    Catheter out on skin (cm):  0    Max flow rate:  999ml/hr    Arm circumference:  32cm  Post-procedure details:     Post-procedure:  Dressing applied and securement device placed    Assessment:  Blood return through all ports and free fluid flow (sherlock 3cg confirmed placement)    Post-procedure complications: none      Patient tolerance of procedure:   Tolerated well, no immediate complications  Comments:      ID maryana for picc placement  Lot#[id number] 2019-02-29

## 2019-01-17 NOTE — PROGRESS NOTES
Picc consult:  Pt refusing picc line at this time  Pt provided education about picc and plan of care  Pt's Rn [address] at bedside  Pt continued to refuse and wants to speak with daughter  Dr Zayda Gutierrez notified  Will continue to follow

## 2019-01-17 NOTE — PROGRESS NOTES
Progress Note - Lupe Smithn 72 y o  male MRN: [id number]    Unit/Bed#: E5 -01 Encounter: [id number]  Subjective:   Breathing good    Annoyed with oxygen  [place] Organ No CP or edema  No palpitations or lightheadedness    Objective:   Vitals: Blood pressure 130/72, pulse 71, temperature 97 9 °F (36 6 °C), temperature source Temporal, resp  rate 18, height 5' 8" (1 727 m), weight 71 7 kg (158 lb 1 1 oz), SpO2 96 %  ,Body mass index is 24 03 kg/m²  CBC with diff:   Results from last 7 days  Lab Units 01/17/19  0507   WBC Thousand/uL 10 16   RBC Million/uL 4 02   HEMOGLOBIN g/dL 12 4   HEMATOCRIT % 37 4   MCV fL 93   MCH pg 30 8   MCHC g/dL 33 2   RDW % 13 4   MPV fL 9 6   PLATELETS Thousands/uL 317     CMP:   Results from last 7 days  Lab Units 01/17/19  0507   SODIUM mmol/L 134*   POTASSIUM mmol/L 3 4*   CHLORIDE mmol/L 99*   CO2 mmol/L 26   BUN mg/dL 11   CREATININE mg/dL 0 71   CALCIUM mg/dL 9 0   EGFR ml/min/1 73sq m 94     Magnesium:   Results from last 7 days  Lab Units 01/17/19  0507   MAGNESIUM mg/dL 2 0       Physical exam:  Lungs-decreased breath sounds with a few rales at the right base  No wheezing  Heart-regular with grade 1-2 systolic murmur at the base  Abdomen-nontender without mass organomegaly  Extremities-no edema  Pulses absent    Assessment:  1  Urosepsis  Improved  Management as per Infectious Disease and Internal Medicine  2  CAD status post coronary artery bypass grafting in 2009  Cardiac catheterization in 2013 showed severe disease of the native circumflex but with patent grafts  Recent stress testing did show ischemia  Patient with stable angina prior to admission  3  Nontransmural myocardial infarction type 2 related to severe sepsis superimposed upon CAD  4  Mild-to-moderate aortic stenosis  5  Diabetes mellitus  6  Hyperlipidemia-on statin therapy  7  Hypertension  Well controlled on beta-blocker and ACE-inhibitor  8   Severe PVD status post intervention of the lower extremities in the past  9  Ongoing smoking with COPD  10  Mild left ventricular systolic dysfunction due to CAD  11  Acute systolic heart failure postoperatively  Patient has received Lasix and is now off of this and appears improved         Plan:    Continue dual antiplatelet therapy  Continue statin  Continue lisinopril and metoprolol   Continue Ranexa  Hold on diuretics since he generally does not require this is an outpatient and may have been fluid overloaded incident to the acute illness  Will repeat proBNP in the a m     This was 2200 at the beginning of the hospitalization-may need ongoing diuresis      India Olsen MD

## 2019-01-18 LAB
ANION GAP SERPL CALCULATED.3IONS-SCNC: 7 MMOL/L (ref 4–13)
BASOPHILS # BLD AUTO: 0.05 THOUSANDS/ΜL (ref 0–0.1)
BASOPHILS NFR BLD AUTO: 1 % (ref 0–1)
BUN SERPL-MCNC: 13 MG/DL (ref 5–25)
CALCIUM SERPL-MCNC: 8.6 MG/DL (ref 8.3–10.1)
CHLORIDE SERPL-SCNC: 101 MMOL/L (ref 100–108)
CO2 SERPL-SCNC: 27 MMOL/L (ref 21–32)
CREAT SERPL-MCNC: 0.72 MG/DL (ref 0.6–1.3)
EOSINOPHIL # BLD AUTO: 0.14 THOUSAND/ΜL (ref 0–0.61)
EOSINOPHIL NFR BLD AUTO: 1 % (ref 0–6)
ERYTHROCYTE [DISTWIDTH] IN BLOOD BY AUTOMATED COUNT: 13.6 % (ref 11.6–15.1)
GFR SERPL CREATININE-BSD FRML MDRD: 94 ML/MIN/1.73SQ M
GLUCOSE SERPL-MCNC: 172 MG/DL (ref 65–140)
GLUCOSE SERPL-MCNC: 179 MG/DL (ref 65–140)
GLUCOSE SERPL-MCNC: 252 MG/DL (ref 65–140)
GLUCOSE SERPL-MCNC: 261 MG/DL (ref 65–140)
HCT VFR BLD AUTO: 34.2 % (ref 36.5–49.3)
HGB BLD-MCNC: 11.3 G/DL (ref 12–17)
IMM GRANULOCYTES # BLD AUTO: 0.06 THOUSAND/UL (ref 0–0.2)
IMM GRANULOCYTES NFR BLD AUTO: 1 % (ref 0–2)
LYMPHOCYTES # BLD AUTO: 1.51 THOUSANDS/ΜL (ref 0.6–4.47)
LYMPHOCYTES NFR BLD AUTO: 15 % (ref 14–44)
MCH RBC QN AUTO: 31 PG (ref 26.8–34.3)
MCHC RBC AUTO-ENTMCNC: 33 G/DL (ref 31.4–37.4)
MCV RBC AUTO: 94 FL (ref 82–98)
MONOCYTES # BLD AUTO: 0.88 THOUSAND/ΜL (ref 0.17–1.22)
MONOCYTES NFR BLD AUTO: 9 % (ref 4–12)
NEUTROPHILS # BLD AUTO: 7.73 THOUSANDS/ΜL (ref 1.85–7.62)
NEUTS SEG NFR BLD AUTO: 73 % (ref 43–75)
NRBC BLD AUTO-RTO: 0 /100 WBCS
NT-PROBNP SERPL-MCNC: ABNORMAL PG/ML
PLATELET # BLD AUTO: 350 THOUSANDS/UL (ref 149–390)
PMV BLD AUTO: 9.6 FL (ref 8.9–12.7)
POTASSIUM SERPL-SCNC: 3.8 MMOL/L (ref 3.5–5.3)
RBC # BLD AUTO: 3.64 MILLION/UL (ref 3.88–5.62)
SODIUM SERPL-SCNC: 135 MMOL/L (ref 136–145)
WBC # BLD AUTO: 10.37 THOUSAND/UL (ref 4.31–10.16)

## 2019-01-18 PROCEDURE — 99232 SBSQ HOSP IP/OBS MODERATE 35: CPT | Performed by: HOSPITALIST

## 2019-01-18 PROCEDURE — 80048 BASIC METABOLIC PNL TOTAL CA: CPT | Performed by: HOSPITALIST

## 2019-01-18 PROCEDURE — 83880 ASSAY OF NATRIURETIC PEPTIDE: CPT | Performed by: INTERNAL MEDICINE

## 2019-01-18 PROCEDURE — 99232 SBSQ HOSP IP/OBS MODERATE 35: CPT | Performed by: INTERNAL MEDICINE

## 2019-01-18 PROCEDURE — 82948 REAGENT STRIP/BLOOD GLUCOSE: CPT

## 2019-01-18 PROCEDURE — 85025 COMPLETE CBC W/AUTO DIFF WBC: CPT | Performed by: HOSPITALIST

## 2019-01-18 RX ADMIN — AMPICILLIN SODIUM 2000 MG: 2 INJECTION, POWDER, FOR SOLUTION INTRAMUSCULAR; INTRAVENOUS at 23:35

## 2019-01-18 RX ADMIN — AMPICILLIN SODIUM 2000 MG: 2 INJECTION, POWDER, FOR SOLUTION INTRAMUSCULAR; INTRAVENOUS at 16:49

## 2019-01-18 RX ADMIN — MELATONIN TAB 3 MG 6 MG: 3 TAB at 22:24

## 2019-01-18 RX ADMIN — INSULIN LISPRO 2 UNITS: 100 INJECTION, SOLUTION INTRAVENOUS; SUBCUTANEOUS at 22:28

## 2019-01-18 RX ADMIN — AMPICILLIN SODIUM 2000 MG: 2 INJECTION, POWDER, FOR SOLUTION INTRAMUSCULAR; INTRAVENOUS at 05:36

## 2019-01-18 RX ADMIN — LISINOPRIL 10 MG: 10 TABLET ORAL at 08:35

## 2019-01-18 RX ADMIN — AMPICILLIN SODIUM 2000 MG: 2 INJECTION, POWDER, FOR SOLUTION INTRAMUSCULAR; INTRAVENOUS at 11:50

## 2019-01-18 RX ADMIN — INSULIN LISPRO 2 UNITS: 100 INJECTION, SOLUTION INTRAVENOUS; SUBCUTANEOUS at 16:49

## 2019-01-18 RX ADMIN — ASPIRIN 81 MG: 81 TABLET, COATED ORAL at 08:35

## 2019-01-18 RX ADMIN — NICOTINE 1 PATCH: 21 PATCH, EXTENDED RELEASE TRANSDERMAL at 08:36

## 2019-01-18 RX ADMIN — INSULIN LISPRO 2 UNITS: 100 INJECTION, SOLUTION INTRAVENOUS; SUBCUTANEOUS at 11:51

## 2019-01-18 RX ADMIN — TAMSULOSIN HYDROCHLORIDE 0.4 MG: 0.4 CAPSULE ORAL at 16:49

## 2019-01-18 RX ADMIN — RANOLAZINE 500 MG: 500 TABLET, FILM COATED, EXTENDED RELEASE ORAL at 22:27

## 2019-01-18 RX ADMIN — CLOPIDOGREL BISULFATE 75 MG: 75 TABLET ORAL at 08:35

## 2019-01-18 RX ADMIN — INSULIN LISPRO 1 UNITS: 100 INJECTION, SOLUTION INTRAVENOUS; SUBCUTANEOUS at 08:36

## 2019-01-18 RX ADMIN — METOPROLOL SUCCINATE 75 MG: 25 TABLET, EXTENDED RELEASE ORAL at 08:35

## 2019-01-18 RX ADMIN — INSULIN GLARGINE 10 UNITS: 100 INJECTION, SOLUTION SUBCUTANEOUS at 22:28

## 2019-01-18 RX ADMIN — RANOLAZINE 500 MG: 500 TABLET, FILM COATED, EXTENDED RELEASE ORAL at 08:35

## 2019-01-18 RX ADMIN — ATORVASTATIN CALCIUM 80 MG: 80 TABLET, FILM COATED ORAL at 16:49

## 2019-01-18 NOTE — SOCIAL WORK
Message received from pt's daughter Danii Krueger regarding 2 more choices for referrals for SNF  She requested referrals be made to [facility] and [facility]  Referals made  [facility] has no bed  [facility] does not participate with [facility]  A message was left for pt's daughter informing of this  Pt informed as well  Will continue to follow-up

## 2019-01-18 NOTE — PLAN OF CARE
DISCHARGE PLANNING     Discharge to home or other facility with appropriate resources Progressing        DISCHARGE PLANNING - CARE MANAGEMENT     Discharge to post-acute care or home with appropriate resources Progressing        GENITOURINARY - ADULT     Maintains or returns to baseline urinary function Progressing     Absence of urinary retention Progressing     Urinary catheter remains patent Progressing        INFECTION - ADULT     Absence or prevention of progression during hospitalization Progressing        Knowledge Deficit     Patient/family/caregiver demonstrates understanding of disease process, treatment plan, medications, and discharge instructions Progressing        MUSCULOSKELETAL - ADULT     Maintain or return mobility to safest level of function Progressing     Maintain proper alignment of affected body part Progressing        Nutrition/Hydration-ADULT     Nutrient/Hydration intake appropriate for improving, restoring or maintaining nutritional needs Progressing        PAIN - ADULT     Verbalizes/displays adequate comfort level or baseline comfort level Progressing        Potential for Falls     Patient will remain free of falls Progressing        Prexisting or High Potential for Compromised Skin Integrity     Skin integrity is maintained or improved Progressing        RESPIRATORY - ADULT     Achieves optimal ventilation and oxygenation Progressing        SAFETY ADULT     Maintain or return to baseline ADL function Progressing     Maintain or return mobility status to optimal level Progressing     Patient will remain free of falls Progressing

## 2019-01-18 NOTE — PROGRESS NOTES
Progress Note - Sonya Quintana 1945, 72 y o  male MRN: [id number]    Unit/Bed#: E5 -01 Encounter: [id number]    Primary Care Provider: No primary care provider on file  Date and time admitted to hospital: 2019  2:30 AM        * SIRS (systemic inflammatory response syndrome) ([address] )   Assessment & Plan    Klebsiella bacteremia likely due to urinary source  Appreciate ID help  Recommend IV Ampicillin through   Has PICC     Acute cystitis without hematuria   Assessment & Plan    Likely caused the Klebsiella bacteremia  Planning on IV Ampiciilin through            Subjective:   Feels tired today  But otherwise no complaints  No fever or chills      Objective:     Vitals:   Temp (24hrs), Av °F (36 7 °C), Min:97 5 °F (36 4 °C), Max:98 2 °F (36 8 °C)    Temp:  [97 5 °F (36 4 °C)-98 2 °F (36 8 °C)] 98 2 °F (36 8 °C)  HR:  [66-75] 75  Resp:  [18] 18  BP: (125-152)/(68-75) 152/72  SpO2:  [95 %-97 %] 96 %  Body mass index is 24 03 kg/m²  Input and Output Summary (last 24 hours): Intake/Output Summary (Last 24 hours) at 19 1147  Last data filed at 19 1038   Gross per 24 hour   Intake              240 ml   Output             2075 ml   Net            -1835 ml       Physical Exam:     Physical Exam   HENT:   Head: Normocephalic and atraumatic  Eyes: Pupils are equal, round, and reactive to light  EOM are normal    Cardiovascular: Normal rate and regular rhythm  Exam reveals no gallop and no friction rub  No murmur heard  Pulmonary/Chest: Effort normal and breath sounds normal  He has no wheezes  He has no rales  Abdominal: Soft  Bowel sounds are normal  There is no tenderness  Musculoskeletal: He exhibits no edema  Nursing note and vitals reviewed              Additional Data:     Labs:      Results from last 7 days  Lab Units 19  0536   WBC Thousand/uL 10 37*   HEMOGLOBIN g/dL 11 3*   HEMATOCRIT % 34 2*   PLATELETS Thousands/uL 350   NEUTROS PCT % 73   LYMPHS PCT % 15   MONOS PCT % 9   EOS PCT % 1       Results from last 7 days  Lab Units 01/18/19  0536   POTASSIUM mmol/L 3 8   CHLORIDE mmol/L 101   CO2 mmol/L 27   BUN mg/dL 13   CREATININE mg/dL 0 72   CALCIUM mg/dL 8 6           Results from last 7 days  Lab Units 01/18/19  0810 01/17/19  2124 01/17/19  1600 01/17/19  1329 01/17/19  1129 01/17/19  0748 01/17/19  0636 01/16/19  2043 01/16/19  1549 01/16/19  1101 01/16/19  0818 01/15/19  2141   POC GLUCOSE mg/dl 172* 265* 303* 337* 295* 219* 227* 328* 286* 354* 252* 257*       Results from last 7 days  Lab Units 01/14/19  0503   HEMOGLOBIN A1C % 7 5*           * I Have Reviewed All Lab Data     Recent Cultures (last 7 days):       Results from last 7 days  Lab Units 01/14/19  1328   BLOOD CULTURE  No Growth at 72 hrs  No Growth at 72 hrs  Last 24 Hours Medication List:     Current Facility-Administered Medications:  acetaminophen 650 mg Oral Q6H PRN Cyndie Styles PA-C    ampicillin 2,000 mg Intravenous Q6H Yudi Mejia MD Last Rate: 2,000 mg (01/18/19 0536)   aspirin 81 mg Oral Daily Sarwat Serrano MD    atorvastatin 80 mg Oral Daily With Yoselin Henley MD    clopidogrel 75 mg Oral Daily Jacey Juarez MD    guaiFENesin 200 mg Oral Q4H PRN VARGAS Wynn    insulin glargine 10 Units Subcutaneous HS Hever Selby MD    insulin lispro 1-5 Units Subcutaneous TID AC Hever Selby MD    insulin lispro 1-5 Units Subcutaneous HS Hever Selby MD    lisinopril 10 mg Oral Daily Sarwat Serrano MD    melatonin 6 mg Oral HS VARGAS Wynn    melatonin 6 mg Oral PRN VARGAS Knapp    metoprolol succinate 75 mg Oral Daily Sarwat Serrano MD    nicotine 1 patch Transdermal Daily Cyndie Styles PA-C    ondansetron 4 mg Intravenous Q4H PRN Virginia Mars Spatzer, CRNP    ranolazine 500 mg Oral Q12H [facility] Sarwat Serrano MD    tamsulosin 0 4 mg Oral Daily With Yeimy Dominique MD          VTE Pharmacologic Prophylaxis:   Pharmacologic:         Current Length of Stay: 9 day(s)    Current Patient Status: Inpatient       Discharge Plan: looking for SNF    Code Status: Level 1 - Full Code           Today, Patient Was Seen By: Chris Liu DO    ** Please Note: Dictation voice to text software may have been used in the creation of this document   **

## 2019-01-18 NOTE — ASSESSMENT & PLAN NOTE
Klebsiella bacteremia likely due to urinary source  Appreciate ID help  Recommend IV Ampicillin through 1/22  Has PICC

## 2019-01-18 NOTE — PROGRESS NOTES
Progress Note - Cardiology   Sanaz Knight 72 y o  male MRN: [id number]  Unit/Bed#: E5 -01 Encounter: [id number]      Assessment:     1  Severe sepsis with E coli bacteremia likely urinary source  2  Elevated troponin-likely type 2 non ST elevation myocardial infarction in the setting of sepsis-peak troponin of 11 5  3  Coronary disease status post coronary bypass grafting and stenting in the past with ejection fraction currently 45% with severe hypokinesis of the basal to mid inferior and basal to mid inferolateral walls with mild to moderate aortic stenosis and mild-to-moderate mitral regurgitation  4  Diabetes  5  Hypertension  6  Tobacco use with COPD on imaging  7  Large noncalcified abdominal aortic plaque with areas of ulceration  8  NSVT    Discussion/Recommendations:    Would continue current regimen  Elevated BNP but no clinical evidence of heart failure  Follow-up with primary cardiologist-ischemic workup can be considered  Will see as needed while here      Subjective:  Denies chest pain or trouble breathing      Physical Exam:  GEN:  NAD  HEENT:  MMM, NCAT, pink conjunctiva, EOMI, nonicteric sclera  CV:  NO JVD/HJR, RR, NO M/R/G, +S1/S2, NO PARASTERNAL HEAVE/THRILL, NO LE EDEMA, NO HEPATIC SYSTOLIC PULSATION, WARM EXTREMITIES  RESP:  CTAB/L  ABD:  SOFT, NT, NO GROSS ORGANOMEGALY        Vitals:   /72 (BP Location: Left arm)   Pulse 75   Temp 98 2 °F (36 8 °C) (Temporal)   Resp 18   Ht 5' 8" (1 727 m)   Wt 71 7 kg (158 lb 1 1 oz)   SpO2 96%   BMI 24 03 kg/m²   Vitals:    01/09/19 1123 01/10/19 0537   Weight: 70 7 kg (155 lb 13 8 oz) 71 7 kg (158 lb 1 1 oz)       Intake/Output Summary (Last 24 hours) at 01/18/19 0926  Last data filed at 01/18/19 0900   Gross per 24 hour   Intake              240 ml   Output             1950 ml   Net            -1710 ml           Lab Results:    Results from last 7 days  Lab Units 01/18/19  0536   WBC Thousand/uL 10 37*   HEMOGLOBIN g/dL 11 3* HEMATOCRIT % 34 2*   PLATELETS Thousands/uL 350       Results from last 7 days  Lab Units 01/18/19  0536   POTASSIUM mmol/L 3 8   CHLORIDE mmol/L 101   CO2 mmol/L 27   BUN mg/dL 13   CREATININE mg/dL 0 72   CALCIUM mg/dL 8 6       Results from last 7 days  Lab Units 01/18/19  0536   POTASSIUM mmol/L 3 8   CHLORIDE mmol/L 101   CO2 mmol/L 27   BUN mg/dL 13   CREATININE mg/dL 0 72   CALCIUM mg/dL 8 6             Medications:    Current Facility-Administered Medications:     acetaminophen (TYLENOL) tablet 650 mg, 650 mg, Oral, Q6H PRN, Marianna Mann PA-C, 650 mg at 01/16/19 2046    ampicillin (OMNIPEN) 2,000 mg in sodium chloride 0 9 % 100 mL IVPB, 2,000 mg, Intravenous, Q6H, Sherri Silverman MD, Last Rate: 200 mL/hr at 01/18/19 0536, 2,000 mg at 01/18/19 0536    aspirin (ECOTRIN LOW STRENGTH) EC tablet 81 mg, 81 mg, Oral, Daily, Jeff Munoz MD, 81 mg at 01/18/19 0835    atorvastatin (LIPITOR) tablet 80 mg, 80 mg, Oral, Daily With Pavan Michaud MD, 80 mg at 01/17/19 1655    clopidogrel (PLAVIX) tablet 75 mg, 75 mg, Oral, Daily, Erving Epley, MD, 75 mg at 01/18/19 0835    guaiFENesin (ROBITUSSIN) oral solution 200 mg, 200 mg, Oral, Q4H PRN, VARGAS Almaraz, 200 mg at 01/13/19 0832    insulin glargine (LANTUS) subcutaneous injection 10 Units 0 1 mL, 10 Units, Subcutaneous, HS, Kay Hines MD, 10 Units at 01/17/19 2126    insulin lispro (HumaLOG) 100 units/mL subcutaneous injection 1-5 Units, 1-5 Units, Subcutaneous, TID AC, 1 Units at 01/18/19 0836 **AND** Fingerstick Glucose (POCT), , , TID AC, Kay Hines MD    insulin lispro (HumaLOG) 100 units/mL subcutaneous injection 1-5 Units, 1-5 Units, Subcutaneous, HS, Luisana Hutson MD, 2 Units at 01/17/19 2126    lisinopril (ZESTRIL) tablet 10 mg, 10 mg, Oral, Daily, Jeff Munoz MD, 10 mg at 01/18/19 0835    melatonin tablet 6 mg, 6 mg, Oral, HS, VARGAS Almaraz, 6 mg at 01/17/19 2126    melatonin tablet 6 mg, 6 mg, Oral, PRN, Jerri Bradford VARGAS Gallo, 6 mg at 01/15/19 0133    metoprolol succinate (TOPROL-XL) 24 hr tablet 75 mg, 75 mg, Oral, Daily, Zoey Couch MD, 75 mg at 01/18/19 0835    nicotine (NICODERM CQ) 21 mg/24 hr TD 24 hr patch 1 patch, 1 patch, Transdermal, Daily, Gopal Robles PA-C, 1 patch at 01/18/19 0836    ondansetron (ZOFRAN) injection 4 mg, 4 mg, Intravenous, Q4H PRN, Areta Locks Spatzer, CRNP, 4 mg at 01/12/19 2213    ranolazine (RANEXA) 12 hr tablet 500 mg, 500 mg, Oral, Q12H [address], Zoey Couch MD, 500 mg at 01/18/19 0835    tamsulosin (FLOMAX) capsule 0 4 mg, 0 4 mg, Oral, Daily With Melanie Diallo MD, 0 4 mg at 01/17/19 1655    Portions of the record may have been created with voice recognition software  Occasional wrong word or "sound a like" substitutions may have occurred due to the inherent limitations of voice recognition software  Read the chart carefully and recognize, using context, where substitutions have occurred

## 2019-01-19 LAB
ANION GAP SERPL CALCULATED.3IONS-SCNC: 8 MMOL/L (ref 4–13)
BACTERIA BLD CULT: NORMAL
BACTERIA BLD CULT: NORMAL
BASOPHILS # BLD AUTO: 0.03 THOUSANDS/ΜL (ref 0–0.1)
BASOPHILS NFR BLD AUTO: 0 % (ref 0–1)
BUN SERPL-MCNC: 12 MG/DL (ref 5–25)
CALCIUM SERPL-MCNC: 7.9 MG/DL (ref 8.3–10.1)
CHLORIDE SERPL-SCNC: 102 MMOL/L (ref 100–108)
CO2 SERPL-SCNC: 26 MMOL/L (ref 21–32)
CREAT SERPL-MCNC: 0.75 MG/DL (ref 0.6–1.3)
EOSINOPHIL # BLD AUTO: 0.18 THOUSAND/ΜL (ref 0–0.61)
EOSINOPHIL NFR BLD AUTO: 2 % (ref 0–6)
ERYTHROCYTE [DISTWIDTH] IN BLOOD BY AUTOMATED COUNT: 13.5 % (ref 11.6–15.1)
GFR SERPL CREATININE-BSD FRML MDRD: 92 ML/MIN/1.73SQ M
GLUCOSE SERPL-MCNC: 184 MG/DL (ref 65–140)
GLUCOSE SERPL-MCNC: 193 MG/DL (ref 65–140)
GLUCOSE SERPL-MCNC: 230 MG/DL (ref 65–140)
GLUCOSE SERPL-MCNC: 261 MG/DL (ref 65–140)
GLUCOSE SERPL-MCNC: 284 MG/DL (ref 65–140)
GLUCOSE SERPL-MCNC: 297 MG/DL (ref 65–140)
HCT VFR BLD AUTO: 32.7 % (ref 36.5–49.3)
HGB BLD-MCNC: 11 G/DL (ref 12–17)
IMM GRANULOCYTES # BLD AUTO: 0.06 THOUSAND/UL (ref 0–0.2)
IMM GRANULOCYTES NFR BLD AUTO: 1 % (ref 0–2)
LYMPHOCYTES # BLD AUTO: 1.35 THOUSANDS/ΜL (ref 0.6–4.47)
LYMPHOCYTES NFR BLD AUTO: 14 % (ref 14–44)
MAGNESIUM SERPL-MCNC: 1.8 MG/DL (ref 1.6–2.6)
MCH RBC QN AUTO: 31.9 PG (ref 26.8–34.3)
MCHC RBC AUTO-ENTMCNC: 33.6 G/DL (ref 31.4–37.4)
MCV RBC AUTO: 95 FL (ref 82–98)
MONOCYTES # BLD AUTO: 0.84 THOUSAND/ΜL (ref 0.17–1.22)
MONOCYTES NFR BLD AUTO: 9 % (ref 4–12)
NEUTROPHILS # BLD AUTO: 7.36 THOUSANDS/ΜL (ref 1.85–7.62)
NEUTS SEG NFR BLD AUTO: 74 % (ref 43–75)
NRBC BLD AUTO-RTO: 0 /100 WBCS
PLATELET # BLD AUTO: 357 THOUSANDS/UL (ref 149–390)
PMV BLD AUTO: 9.5 FL (ref 8.9–12.7)
POTASSIUM SERPL-SCNC: 3.8 MMOL/L (ref 3.5–5.3)
RBC # BLD AUTO: 3.45 MILLION/UL (ref 3.88–5.62)
SODIUM SERPL-SCNC: 136 MMOL/L (ref 136–145)
WBC # BLD AUTO: 9.82 THOUSAND/UL (ref 4.31–10.16)

## 2019-01-19 PROCEDURE — 83735 ASSAY OF MAGNESIUM: CPT | Performed by: HOSPITALIST

## 2019-01-19 PROCEDURE — 99232 SBSQ HOSP IP/OBS MODERATE 35: CPT | Performed by: HOSPITALIST

## 2019-01-19 PROCEDURE — 80048 BASIC METABOLIC PNL TOTAL CA: CPT | Performed by: HOSPITALIST

## 2019-01-19 PROCEDURE — 82948 REAGENT STRIP/BLOOD GLUCOSE: CPT

## 2019-01-19 PROCEDURE — 85025 COMPLETE CBC W/AUTO DIFF WBC: CPT | Performed by: HOSPITALIST

## 2019-01-19 RX ADMIN — NICOTINE 1 PATCH: 21 PATCH, EXTENDED RELEASE TRANSDERMAL at 08:16

## 2019-01-19 RX ADMIN — TAMSULOSIN HYDROCHLORIDE 0.4 MG: 0.4 CAPSULE ORAL at 16:49

## 2019-01-19 RX ADMIN — ACETAMINOPHEN 650 MG: 325 TABLET ORAL at 18:07

## 2019-01-19 RX ADMIN — INSULIN GLARGINE 10 UNITS: 100 INJECTION, SOLUTION SUBCUTANEOUS at 21:59

## 2019-01-19 RX ADMIN — INSULIN LISPRO 2 UNITS: 100 INJECTION, SOLUTION INTRAVENOUS; SUBCUTANEOUS at 12:30

## 2019-01-19 RX ADMIN — AMPICILLIN SODIUM 2000 MG: 2 INJECTION, POWDER, FOR SOLUTION INTRAMUSCULAR; INTRAVENOUS at 16:48

## 2019-01-19 RX ADMIN — METOPROLOL SUCCINATE 75 MG: 25 TABLET, EXTENDED RELEASE ORAL at 08:17

## 2019-01-19 RX ADMIN — LISINOPRIL 10 MG: 10 TABLET ORAL at 08:17

## 2019-01-19 RX ADMIN — AMPICILLIN SODIUM 2000 MG: 2 INJECTION, POWDER, FOR SOLUTION INTRAMUSCULAR; INTRAVENOUS at 12:19

## 2019-01-19 RX ADMIN — RANOLAZINE 500 MG: 500 TABLET, FILM COATED, EXTENDED RELEASE ORAL at 08:17

## 2019-01-19 RX ADMIN — ACETAMINOPHEN 650 MG: 325 TABLET ORAL at 01:58

## 2019-01-19 RX ADMIN — AMPICILLIN SODIUM 2000 MG: 2 INJECTION, POWDER, FOR SOLUTION INTRAMUSCULAR; INTRAVENOUS at 05:09

## 2019-01-19 RX ADMIN — INSULIN LISPRO 2 UNITS: 100 INJECTION, SOLUTION INTRAVENOUS; SUBCUTANEOUS at 08:16

## 2019-01-19 RX ADMIN — MELATONIN TAB 3 MG 6 MG: 3 TAB at 21:59

## 2019-01-19 RX ADMIN — ASPIRIN 81 MG: 81 TABLET, COATED ORAL at 08:17

## 2019-01-19 RX ADMIN — ATORVASTATIN CALCIUM 80 MG: 80 TABLET, FILM COATED ORAL at 16:49

## 2019-01-19 RX ADMIN — INSULIN LISPRO 2 UNITS: 100 INJECTION, SOLUTION INTRAVENOUS; SUBCUTANEOUS at 16:49

## 2019-01-19 RX ADMIN — CLOPIDOGREL BISULFATE 75 MG: 75 TABLET ORAL at 08:17

## 2019-01-19 RX ADMIN — RANOLAZINE 500 MG: 500 TABLET, FILM COATED, EXTENDED RELEASE ORAL at 21:59

## 2019-01-19 RX ADMIN — ACETAMINOPHEN 650 MG: 325 TABLET ORAL at 08:17

## 2019-01-19 RX ADMIN — INSULIN LISPRO 2 UNITS: 100 INJECTION, SOLUTION INTRAVENOUS; SUBCUTANEOUS at 22:01

## 2019-01-19 NOTE — PROGRESS NOTES
Progress Note - Betina Nuñez 1945, 72 y o  male MRN: [id number]    Unit/Bed#: E5 -01 Encounter: [id number]    Primary Care Provider: No primary care provider on file  Date and time admitted to hospital: 2019  2:30 AM        * SIRS (systemic inflammatory response syndrome) ([address] )   Assessment & Plan    Klebsiella bacteremia likely due to urinary source  Appreciate ID help  Recommend IV Ampicillin through   Has PICC     Acute cystitis without hematuria   Assessment & Plan    Likely caused the Klebsiella bacteremia  Planning on IV Ampiciilin through              Subjective:   Feels well  No fever/chills  His is very weak      Objective:     Vitals:   Temp (24hrs), Av 7 °F (36 5 °C), Min:97 4 °F (36 3 °C), Max:97 8 °F (36 6 °C)    Temp:  [97 4 °F (36 3 °C)-97 8 °F (36 6 °C)] 97 8 °F (36 6 °C)  HR:  [66-70] 67  Resp:  [18] 18  BP: (122-146)/(57-65) 146/65  SpO2:  [96 %-97 %] 96 %  Body mass index is 24 03 kg/m²  Input and Output Summary (last 24 hours): Intake/Output Summary (Last 24 hours) at 19 1412  Last data filed at 19 0008   Gross per 24 hour   Intake                0 ml   Output              400 ml   Net             -400 ml       Physical Exam:     Physical Exam   HENT:   Head: Normocephalic and atraumatic  Eyes: Pupils are equal, round, and reactive to light  EOM are normal    Cardiovascular: Normal rate and regular rhythm  Exam reveals no gallop and no friction rub  No murmur heard  Pulmonary/Chest: Effort normal and breath sounds normal  He has no wheezes  He has no rales  Abdominal: Soft  Bowel sounds are normal  There is no tenderness  Musculoskeletal: He exhibits no edema  Nursing note and vitals reviewed              Additional Data:     Labs:      Results from last 7 days  Lab Units 19  0506   WBC Thousand/uL 9 82   HEMOGLOBIN g/dL 11 0*   HEMATOCRIT % 32 7*   PLATELETS Thousands/uL 357   NEUTROS PCT % 74   LYMPHS PCT % 14   MONOS PCT % 9   EOS PCT % 2       Results from last 7 days  Lab Units 01/19/19  0506   POTASSIUM mmol/L 3 8   CHLORIDE mmol/L 102   CO2 mmol/L 26   BUN mg/dL 12   CREATININE mg/dL 0 75   CALCIUM mg/dL 7 9*           Results from last 7 days  Lab Units 01/19/19  1201 01/19/19  0754 01/18/19  2226 01/18/19  1622 01/18/19  1138 01/18/19  0810 01/17/19  2124 01/17/19  1600 01/17/19  1329 01/17/19  1129 01/17/19  0748 01/17/19  0636   POC GLUCOSE mg/dl 284* 184* 230* 261* 252* 172* 265* 303* 337* 295* 219* 227*       Results from last 7 days  Lab Units 01/14/19  0503   HEMOGLOBIN A1C % 7 5*           * I Have Reviewed All Lab Data     Recent Cultures (last 7 days):       Results from last 7 days  Lab Units 01/14/19  1328   BLOOD CULTURE  No Growth After 4 Days  No Growth After 4 Days  Last 24 Hours Medication List:     Current Facility-Administered Medications:  acetaminophen 650 mg Oral Q6H PRN Blair Jordan PA-C    ampicillin 2,000 mg Intravenous Q6H Karoline James MD Last Rate: 2,000 mg (01/19/19 1219)   aspirin 81 mg Oral Daily Radene Sandifer, MD    atorvastatin 80 mg Oral Daily With Lakhwinder Argueta MD    clopidogrel 75 mg Oral Daily Del Schilder, MD    guaiFENesin 200 mg Oral Q4H PRN VARGAS Epstein    insulin glargine 10 Units Subcutaneous HS Agata Renee MD    insulin lispro 1-5 Units Subcutaneous TID GINGER Renee MD    insulin lispro 1-5 Units Subcutaneous HS Agata Renee MD    lisinopril 10 mg Oral Daily Radene Sandifer, MD    melatonin 6 mg Oral HS VARGAS Epstein    melatonin 6 mg Oral PRN VARGAS Mars    metoprolol succinate 75 mg Oral Daily Radene Sandifer, MD    nicotine 1 patch Transdermal Daily Blair Jordan PA-C    ondansetron 4 mg Intravenous Q4H PRN Eliseo Ridgel Spatzer, CRNP    ranolazine 500 mg Oral Q12H [facility] Radene Sandifer, MD    tamsulosin 0 4 mg Oral Daily With Jeferson Cotto MD          VTE Pharmacologic Prophylaxis:   Pharmacologic:         Current Length of Stay: 10 day(s)    Current Patient Status: Inpatient       Discharge Plan: looking for SNF    Code Status: Level 1 - Full Code           Today, Patient Was Seen By: Nadege Mittal DO    ** Please Note: Dictation voice to text software may have been used in the creation of this document   **

## 2019-01-20 PROBLEM — R78.81 BACTEREMIA DUE TO KLEBSIELLA PNEUMONIAE: Status: ACTIVE | Noted: 2019-01-20

## 2019-01-20 PROBLEM — B96.1 BACTEREMIA DUE TO KLEBSIELLA PNEUMONIAE: Status: ACTIVE | Noted: 2019-01-20

## 2019-01-20 PROBLEM — R33.9 URINARY RETENTION: Status: ACTIVE | Noted: 2019-01-20

## 2019-01-20 LAB
GLUCOSE SERPL-MCNC: 194 MG/DL (ref 65–140)
GLUCOSE SERPL-MCNC: 228 MG/DL (ref 65–140)
GLUCOSE SERPL-MCNC: 236 MG/DL (ref 65–140)

## 2019-01-20 PROCEDURE — 99232 SBSQ HOSP IP/OBS MODERATE 35: CPT | Performed by: HOSPITALIST

## 2019-01-20 PROCEDURE — 82948 REAGENT STRIP/BLOOD GLUCOSE: CPT

## 2019-01-20 RX ORDER — GINSENG 100 MG
1 CAPSULE ORAL 2 TIMES DAILY
Status: DISCONTINUED | OUTPATIENT
Start: 2019-01-20 | End: 2019-01-22 | Stop reason: HOSPADM

## 2019-01-20 RX ADMIN — AMPICILLIN SODIUM 2000 MG: 2 INJECTION, POWDER, FOR SOLUTION INTRAMUSCULAR; INTRAVENOUS at 05:45

## 2019-01-20 RX ADMIN — TAMSULOSIN HYDROCHLORIDE 0.4 MG: 0.4 CAPSULE ORAL at 17:41

## 2019-01-20 RX ADMIN — AMPICILLIN SODIUM 2000 MG: 2 INJECTION, POWDER, FOR SOLUTION INTRAMUSCULAR; INTRAVENOUS at 11:52

## 2019-01-20 RX ADMIN — RANOLAZINE 500 MG: 500 TABLET, FILM COATED, EXTENDED RELEASE ORAL at 07:50

## 2019-01-20 RX ADMIN — METOPROLOL SUCCINATE 75 MG: 25 TABLET, EXTENDED RELEASE ORAL at 07:49

## 2019-01-20 RX ADMIN — ATORVASTATIN CALCIUM 80 MG: 80 TABLET, FILM COATED ORAL at 17:41

## 2019-01-20 RX ADMIN — INSULIN LISPRO 2 UNITS: 100 INJECTION, SOLUTION INTRAVENOUS; SUBCUTANEOUS at 17:40

## 2019-01-20 RX ADMIN — ACETAMINOPHEN 650 MG: 325 TABLET ORAL at 21:55

## 2019-01-20 RX ADMIN — LISINOPRIL 10 MG: 10 TABLET ORAL at 07:50

## 2019-01-20 RX ADMIN — ASPIRIN 81 MG: 81 TABLET, COATED ORAL at 07:50

## 2019-01-20 RX ADMIN — INSULIN LISPRO 2 UNITS: 100 INJECTION, SOLUTION INTRAVENOUS; SUBCUTANEOUS at 11:53

## 2019-01-20 RX ADMIN — BACITRACIN ZINC 1 SMALL APPLICATION: 500 OINTMENT TOPICAL at 17:40

## 2019-01-20 RX ADMIN — AMPICILLIN SODIUM 2000 MG: 2 INJECTION, POWDER, FOR SOLUTION INTRAMUSCULAR; INTRAVENOUS at 00:39

## 2019-01-20 RX ADMIN — NICOTINE 1 PATCH: 21 PATCH, EXTENDED RELEASE TRANSDERMAL at 07:51

## 2019-01-20 RX ADMIN — MELATONIN TAB 3 MG 6 MG: 3 TAB at 21:51

## 2019-01-20 RX ADMIN — INSULIN LISPRO 1 UNITS: 100 INJECTION, SOLUTION INTRAVENOUS; SUBCUTANEOUS at 21:48

## 2019-01-20 RX ADMIN — AMPICILLIN SODIUM 2000 MG: 2 INJECTION, POWDER, FOR SOLUTION INTRAMUSCULAR; INTRAVENOUS at 17:41

## 2019-01-20 RX ADMIN — INSULIN LISPRO 1 UNITS: 100 INJECTION, SOLUTION INTRAVENOUS; SUBCUTANEOUS at 07:53

## 2019-01-20 RX ADMIN — CLOPIDOGREL BISULFATE 75 MG: 75 TABLET ORAL at 07:50

## 2019-01-20 RX ADMIN — RANOLAZINE 500 MG: 500 TABLET, FILM COATED, EXTENDED RELEASE ORAL at 21:52

## 2019-01-20 RX ADMIN — INSULIN GLARGINE 10 UNITS: 100 INJECTION, SOLUTION SUBCUTANEOUS at 21:51

## 2019-01-20 NOTE — ASSESSMENT & PLAN NOTE
Difficult fuller placed by urology  Now having some bleeding around the fuller  Fuller should remain in place at discharge and have urology take out in the office    I will call urology today to have them re-eval the patient with the penile bleeding

## 2019-01-20 NOTE — ASSESSMENT & PLAN NOTE
Difficult fuller - placed by urology  Now having some bleeding around the fuller  I spoke with Urology, will do a voiding trial tomorrow morning, if fails Urology will replace Fuller and patient will be discharged with Fuller in place; appreciate Urology input

## 2019-01-20 NOTE — ASSESSMENT & PLAN NOTE
Thought to be due to urinary source  On IV abx through 1/22 per ID  Has a PICC line  Waiting for rehab placement

## 2019-01-20 NOTE — PROGRESS NOTES
Progress Note - Ed Swartz 1945, 72 y o  male MRN: [id number]    Unit/Bed#: E5 -01 Encounter: [id number]    Primary Care Provider: No primary care provider on file  Date and time admitted to hospital: 2019  2:30 AM        * SIRS (systemic inflammatory response syndrome) (HCC)   Assessment & Plan    Klebsiella bacteremia likely due to urinary source  Appreciate ID help  Recommend IV Ampicillin through   Has PICC     Bacteremia due to Klebsiella pneumoniae   Assessment & Plan    Thought to be due to urinary source  On IV abx through  per ID  Has a PICC line  Waiting for rehab placement     Acute cystitis without hematuria   Assessment & Plan    Likely caused the Klebsiella bacteremia  abx as above       Urinary retention   Assessment & Plan    Difficult fuller placed by urology  Now having some bleeding around the fuller  Fuller should remain in place at discharge and have urology take out in the office  I will call urology today to have them re-eval the patient with the penile bleeding           Subjective:   Patient started having bleeding around his penis last night  He also complains of ongoing weakness  No abd pain  No fever or chills      Objective:     Vitals:   Temp (24hrs), Av 9 °F (36 6 °C), Min:97 7 °F (36 5 °C), Max:98 4 °F (36 9 °C)    Temp:  [97 7 °F (36 5 °C)-98 4 °F (36 9 °C)] 97 7 °F (36 5 °C)  HR:  [59-72] 72  Resp:  [16-18] 18  BP: (121-143)/(62-78) 143/78  SpO2:  [97 %-99 %] 97 %  Body mass index is 24 03 kg/m²  Input and Output Summary (last 24 hours):        Intake/Output Summary (Last 24 hours) at 19 1130  Last data filed at 19 1920   Gross per 24 hour   Intake                0 ml   Output              750 ml   Net             -750 ml       Physical Exam:     Physical Exam          Additional Data:     Labs:      Results from last 7 days  Lab Units 19  0506   WBC Thousand/uL 9 82   HEMOGLOBIN g/dL 11 0*   HEMATOCRIT % 32 7*   PLATELETS Thousands/uL 357   NEUTROS PCT % 74   LYMPHS PCT % 14   MONOS PCT % 9   EOS PCT % 2       Results from last 7 days  Lab Units 01/19/19  0506   POTASSIUM mmol/L 3 8   CHLORIDE mmol/L 102   CO2 mmol/L 26   BUN mg/dL 12   CREATININE mg/dL 0 75   CALCIUM mg/dL 7 9*           Results from last 7 days  Lab Units 01/20/19  1117 01/20/19  0743 01/19/19  2201 01/19/19  1614 01/19/19  1201 01/19/19  0754 01/18/19  2226 01/18/19  1622 01/18/19  1138 01/18/19  0810 01/17/19  2124 01/17/19  1600   POC GLUCOSE mg/dl 228* 194* 261* 297* 284* 184* 230* 261* 252* 172* 265* 303*       Results from last 7 days  Lab Units 01/14/19  0503   HEMOGLOBIN A1C % 7 5*           * I Have Reviewed All Lab Data     Recent Cultures (last 7 days):       Results from last 7 days  Lab Units 01/14/19  1328   BLOOD CULTURE  No Growth After 5 Days  No Growth After 5 Days           Last 24 Hours Medication List:     Current Facility-Administered Medications:  acetaminophen 650 mg Oral Q6H PRN Rufino Mccoy PA-C    ampicillin 2,000 mg Intravenous Q6H Jose Rafael Lopez MD Last Rate: 2,000 mg (01/20/19 0545)   aspirin 81 mg Oral Daily Kishan Matias MD    atorvastatin 80 mg Oral Daily With Yovana Aceves MD    clopidogrel 75 mg Oral Daily Ashley Gutierrez MD    guaiFENesin 200 mg Oral Q4H PRN VARGAS Kennedy    insulin glargine 10 Units Subcutaneous HS Idris Abrams MD    insulin lispro 1-5 Units Subcutaneous TID AC Idris Abrams MD    insulin lispro 1-5 Units Subcutaneous HS Idris Abrams MD    lisinopril 10 mg Oral Daily Kishan Matias MD    melatonin 6 mg Oral HS VARGAS Kennedy    melatonin 6 mg Oral PRN VARGAS Herrera    metoprolol succinate 75 mg Oral Daily Kishan Matias MD    nicotine 1 patch Transdermal Daily Rufino Mccoy PA-C    ondansetron 4 mg Intravenous Q4H PRN Farris Failing Spatzer, CRNP    ranolazine 500 mg Oral Q12H [address] Kishan Matias MD    tamsulosin 0 4 mg Oral Daily With Salma Dubose MD          VTE Pharmacologic Prophylaxis:   Pharmacologic: none with penile bleeding      Current Length of Stay: 11 day(s)    Current Patient Status: Inpatient       Discharge Plan: waiting for SNF approval    Ready for discharge  If he doesn't get it by Wednesday, he won't need the IV abx anymore and the PICC can be discontinued  He still will likely need SNF because he is very debilitated    Code Status: Level 1 - Full Code           Today, Patient Was Seen By: Antoinette Yan DO    ** Please Note: Dictation voice to text software may have been used in the creation of this document   **

## 2019-01-21 PROBLEM — N36.8 URETHRAL BLEEDING: Status: ACTIVE | Noted: 2019-01-21

## 2019-01-21 PROBLEM — Z79.4 TYPE 2 DIABETES MELLITUS WITH HYPERGLYCEMIA, WITH LONG-TERM CURRENT USE OF INSULIN (HCC): Status: ACTIVE | Noted: 2019-01-21

## 2019-01-21 PROBLEM — A41.59 SEPSIS DUE TO KLEBSIELLA (HCC): Status: ACTIVE | Noted: 2019-01-09

## 2019-01-21 PROBLEM — E11.65 TYPE 2 DIABETES MELLITUS WITH HYPERGLYCEMIA, WITH LONG-TERM CURRENT USE OF INSULIN (HCC): Status: ACTIVE | Noted: 2019-01-21

## 2019-01-21 LAB
ANION GAP SERPL CALCULATED.3IONS-SCNC: 10 MMOL/L (ref 4–13)
BASOPHILS # BLD AUTO: 0.04 THOUSANDS/ΜL (ref 0–0.1)
BASOPHILS NFR BLD AUTO: 0 % (ref 0–1)
BUN SERPL-MCNC: 8 MG/DL (ref 5–25)
CALCIUM SERPL-MCNC: 8.3 MG/DL (ref 8.3–10.1)
CHLORIDE SERPL-SCNC: 102 MMOL/L (ref 100–108)
CO2 SERPL-SCNC: 27 MMOL/L (ref 21–32)
CREAT SERPL-MCNC: 0.75 MG/DL (ref 0.6–1.3)
EOSINOPHIL # BLD AUTO: 0.16 THOUSAND/ΜL (ref 0–0.61)
EOSINOPHIL NFR BLD AUTO: 2 % (ref 0–6)
ERYTHROCYTE [DISTWIDTH] IN BLOOD BY AUTOMATED COUNT: 13.5 % (ref 11.6–15.1)
GFR SERPL CREATININE-BSD FRML MDRD: 92 ML/MIN/1.73SQ M
GLUCOSE SERPL-MCNC: 156 MG/DL (ref 65–140)
GLUCOSE SERPL-MCNC: 164 MG/DL (ref 65–140)
GLUCOSE SERPL-MCNC: 184 MG/DL (ref 65–140)
GLUCOSE SERPL-MCNC: 203 MG/DL (ref 65–140)
GLUCOSE SERPL-MCNC: 213 MG/DL (ref 65–140)
GLUCOSE SERPL-MCNC: 233 MG/DL (ref 65–140)
HCT VFR BLD AUTO: 33.3 % (ref 36.5–49.3)
HGB BLD-MCNC: 11.4 G/DL (ref 12–17)
IMM GRANULOCYTES # BLD AUTO: 0.03 THOUSAND/UL (ref 0–0.2)
IMM GRANULOCYTES NFR BLD AUTO: 0 % (ref 0–2)
LYMPHOCYTES # BLD AUTO: 1.27 THOUSANDS/ΜL (ref 0.6–4.47)
LYMPHOCYTES NFR BLD AUTO: 13 % (ref 14–44)
MAGNESIUM SERPL-MCNC: 1.8 MG/DL (ref 1.6–2.6)
MCH RBC QN AUTO: 32.3 PG (ref 26.8–34.3)
MCHC RBC AUTO-ENTMCNC: 34.2 G/DL (ref 31.4–37.4)
MCV RBC AUTO: 94 FL (ref 82–98)
MONOCYTES # BLD AUTO: 1 THOUSAND/ΜL (ref 0.17–1.22)
MONOCYTES NFR BLD AUTO: 10 % (ref 4–12)
NEUTROPHILS # BLD AUTO: 7.41 THOUSANDS/ΜL (ref 1.85–7.62)
NEUTS SEG NFR BLD AUTO: 75 % (ref 43–75)
NRBC BLD AUTO-RTO: 0 /100 WBCS
PLATELET # BLD AUTO: 407 THOUSANDS/UL (ref 149–390)
PMV BLD AUTO: 9.6 FL (ref 8.9–12.7)
POTASSIUM SERPL-SCNC: 3.7 MMOL/L (ref 3.5–5.3)
RBC # BLD AUTO: 3.53 MILLION/UL (ref 3.88–5.62)
SODIUM SERPL-SCNC: 139 MMOL/L (ref 136–145)
WBC # BLD AUTO: 9.91 THOUSAND/UL (ref 4.31–10.16)

## 2019-01-21 PROCEDURE — 85025 COMPLETE CBC W/AUTO DIFF WBC: CPT | Performed by: HOSPITALIST

## 2019-01-21 PROCEDURE — 82948 REAGENT STRIP/BLOOD GLUCOSE: CPT

## 2019-01-21 PROCEDURE — 99232 SBSQ HOSP IP/OBS MODERATE 35: CPT | Performed by: PHYSICIAN ASSISTANT

## 2019-01-21 PROCEDURE — 99232 SBSQ HOSP IP/OBS MODERATE 35: CPT | Performed by: FAMILY MEDICINE

## 2019-01-21 PROCEDURE — 83735 ASSAY OF MAGNESIUM: CPT | Performed by: HOSPITALIST

## 2019-01-21 PROCEDURE — 97530 THERAPEUTIC ACTIVITIES: CPT

## 2019-01-21 PROCEDURE — 97116 GAIT TRAINING THERAPY: CPT

## 2019-01-21 PROCEDURE — 80048 BASIC METABOLIC PNL TOTAL CA: CPT | Performed by: HOSPITALIST

## 2019-01-21 RX ORDER — INSULIN GLARGINE 100 [IU]/ML
15 INJECTION, SOLUTION SUBCUTANEOUS
Status: DISCONTINUED | OUTPATIENT
Start: 2019-01-21 | End: 2019-01-22 | Stop reason: HOSPADM

## 2019-01-21 RX ADMIN — ACETAMINOPHEN 650 MG: 325 TABLET ORAL at 08:01

## 2019-01-21 RX ADMIN — RANOLAZINE 500 MG: 500 TABLET, FILM COATED, EXTENDED RELEASE ORAL at 20:23

## 2019-01-21 RX ADMIN — AMPICILLIN SODIUM 2000 MG: 2 INJECTION, POWDER, FOR SOLUTION INTRAMUSCULAR; INTRAVENOUS at 00:14

## 2019-01-21 RX ADMIN — AMPICILLIN SODIUM 2000 MG: 2 INJECTION, POWDER, FOR SOLUTION INTRAMUSCULAR; INTRAVENOUS at 05:28

## 2019-01-21 RX ADMIN — ACETAMINOPHEN 650 MG: 325 TABLET ORAL at 20:23

## 2019-01-21 RX ADMIN — INSULIN LISPRO 1 UNITS: 100 INJECTION, SOLUTION INTRAVENOUS; SUBCUTANEOUS at 11:55

## 2019-01-21 RX ADMIN — CLOPIDOGREL BISULFATE 75 MG: 75 TABLET ORAL at 08:00

## 2019-01-21 RX ADMIN — INSULIN LISPRO 1 UNITS: 100 INJECTION, SOLUTION INTRAVENOUS; SUBCUTANEOUS at 22:33

## 2019-01-21 RX ADMIN — INSULIN LISPRO 1 UNITS: 100 INJECTION, SOLUTION INTRAVENOUS; SUBCUTANEOUS at 08:00

## 2019-01-21 RX ADMIN — INSULIN GLARGINE 15 UNITS: 100 INJECTION, SOLUTION SUBCUTANEOUS at 22:33

## 2019-01-21 RX ADMIN — ATORVASTATIN CALCIUM 80 MG: 80 TABLET, FILM COATED ORAL at 16:59

## 2019-01-21 RX ADMIN — AMPICILLIN SODIUM 2000 MG: 2 INJECTION, POWDER, FOR SOLUTION INTRAMUSCULAR; INTRAVENOUS at 16:59

## 2019-01-21 RX ADMIN — ASPIRIN 81 MG: 81 TABLET, COATED ORAL at 08:01

## 2019-01-21 RX ADMIN — AMPICILLIN SODIUM 2000 MG: 2 INJECTION, POWDER, FOR SOLUTION INTRAMUSCULAR; INTRAVENOUS at 11:56

## 2019-01-21 RX ADMIN — MELATONIN TAB 3 MG 6 MG: 3 TAB at 22:28

## 2019-01-21 RX ADMIN — RANOLAZINE 500 MG: 500 TABLET, FILM COATED, EXTENDED RELEASE ORAL at 08:01

## 2019-01-21 RX ADMIN — BACITRACIN ZINC 1 SMALL APPLICATION: 500 OINTMENT TOPICAL at 08:01

## 2019-01-21 RX ADMIN — NICOTINE 1 PATCH: 21 PATCH, EXTENDED RELEASE TRANSDERMAL at 08:01

## 2019-01-21 RX ADMIN — TAMSULOSIN HYDROCHLORIDE 0.4 MG: 0.4 CAPSULE ORAL at 16:59

## 2019-01-21 RX ADMIN — INSULIN LISPRO 2 UNITS: 100 INJECTION, SOLUTION INTRAVENOUS; SUBCUTANEOUS at 16:59

## 2019-01-21 RX ADMIN — LISINOPRIL 10 MG: 10 TABLET ORAL at 08:01

## 2019-01-21 RX ADMIN — AMPICILLIN SODIUM 2000 MG: 2 INJECTION, POWDER, FOR SOLUTION INTRAMUSCULAR; INTRAVENOUS at 22:53

## 2019-01-21 RX ADMIN — METOPROLOL SUCCINATE 75 MG: 25 TABLET, EXTENDED RELEASE ORAL at 08:00

## 2019-01-21 NOTE — SOCIAL WORK
Pt clinicals, along with updated PT notes, were faxed to Maurice Galan at [facility] at fax number [phone]  Her phone is [phone] ext [phone]  CM awaiting a return PC with determination

## 2019-01-21 NOTE — PROGRESS NOTES
Progress Note - Urology  Ed Swartz 1945, 72 y o  male MRN: [id number]    Unit/Bed#: E5 -01 Encounter: [id number]    Urethral bleeding   Assessment & Plan    Bleeding around the urethral meatus, around the Marshall catheter but not into the Marshall catheter bag or any evidence of any further up  bleeding  No hematuria or clots in the Marshall or the Marshall bag  Marshall continues to drain well  Likely secondary to traumatic Marshall catheterization upon reinsertion of Marshall  Plan: Marshall catheter removal tomorrow morning for voiding trial      Urinary retention   Assessment & Plan    Status post Marshall catheter decompression after failed voiding trial on 1/13/2019  Plan:    Recommend repeating void trial with Marshall catheter removal tomorrow 1/22/2019 at 6:00 a m            Bedside rounds performed with Kimberly Taylor RN  Discussed with Dr Maximino Velásquez of Internal Medicine  Subjective/Objective     Subjective:   CC:  I am bleeding around my Chong Vanessab was initially seen in consultation by Dr Brijesh Lazar of the Urology Service on 1/12/2019 at which time he had UTI with urinary retention  He then failed a void trial with 3 insertion of his Marshall catheter on 1/3/2019  It has remained indwelling for the last 8 days  He has continued to have some bleeding around the Marshall catheter with clear yellow urine, no clots or hematuria draining into the catheter but slight bleeding on dripping of blood around the urethral orifice  Urology is asked to see him again today relative to this bleeding, as he prepares to be transferred to rehabilitation  He notes the Marshall catheter is uncomfortable but denies any specific pain  No bladder spasm  No recent fever chills  He has been eating and drinking well  He reports he is moving his bowels at baseline  Review of Systems   Constitutional: Negative for activity change and appetite change  HENT: Negative for congestion and ear pain  Eyes: Negative for pain  Respiratory: Negative for cough and shortness of breath  Cardiovascular: Negative for chest pain and palpitations  Gastrointestinal: Negative for abdominal distention, abdominal pain, blood in stool, constipation, diarrhea and nausea  Genitourinary: Positive for discharge (Bleeding around the Marshall catheter at the tip of his penis  Bright red blood, small drops, worse when he walks around  )  Negative for difficulty urinating and dysuria  Musculoskeletal: Negative for arthralgias and myalgias  Skin: Negative for rash  Allergic/Immunologic: Negative for immunocompromised state  Neurological: Negative for dizziness and headaches  Hematological: Negative for adenopathy  Does not bruise/bleed easily  Psychiatric/Behavioral: Negative for agitation  The patient is not nervous/anxious  Objective:  Vitals: Blood pressure 133/70, pulse 70, temperature 97 7 °F (36 5 °C), temperature source Temporal, resp  rate 18, height 5' 8" (1 727 m), weight 71 7 kg (158 lb 1 1 oz), SpO2 96 %  ,Body mass index is 24 03 kg/m²  Intake/Output Summary (Last 24 hours) at 01/21/19 1502  Last data filed at 01/21/19 2300   Gross per 24 hour   Intake                0 ml   Output             2125 ml   Net            -2125 ml     Invasive Devices     Peripherally Inserted Central Catheter Line            PICC Line 29/96/88 Right Basilic 4 days          Drain            Urethral Catheter Coude 16 Fr  8 days                Physical Exam   Constitutional: He is oriented to person, place, and time  He appears well-developed and well-nourished  He is cooperative  He does not appear ill  No distress  Well-appearing 14-year-old male, sitting upright in bed watching TV at the time of examination  Reports the Marshall catheter is uncomfortable but denies any karrie pain at the time of exam    HENT:   Head: Normocephalic and atraumatic  Moist mucous membranes      Eyes: Conjunctivae and EOM are normal    Neck: Normal range of motion  Neck supple  No tracheal deviation present  Cardiovascular: Normal rate, regular rhythm and normal heart sounds  No murmur heard  Pulmonary/Chest: Effort normal and breath sounds normal  No respiratory distress  He has no wheezes  Good airflow bilaterally on deep inspiration  Abdominal: Soft  Bowel sounds are normal  He exhibits no distension and no mass  There is no tenderness  Abdomen soft and benign without significant suprapubic fullness  Genitourinary:   Genitourinary Comments: Uncircumcised penis, able to retract foreskin  Evidence of some bleeding around urethral meatus but no active bleeding at the time of exam   Marshall catheter placed, draining clear sagar urine  Musculoskeletal: Normal range of motion  He exhibits no edema  Neurological: He is alert and oriented to person, place, and time  Skin: Skin is warm and dry  No rash noted  He is not diaphoretic  No erythema  No pallor  Psychiatric: He has a normal mood and affect  His behavior is normal  Judgment and thought content normal    Nursing note and vitals reviewed  History:    Past Medical History:   Diagnosis Date    Diabetes mellitus ([facility] [address] )     Hypertension      Past Surgical History:   Procedure Laterality Date    CORONARY ARTERY BYPASS GRAFT       History reviewed  No pertinent family history  Social History     Social History    Marital status: Unknown     Spouse name: N/A    Number of children: N/A    Years of education: N/A     Social History Main Topics    Smoking status: Current Every Day Smoker    Smokeless tobacco: None    Alcohol use None    Drug use: Unknown    Sexual activity: Not Asked     Other Topics Concern    None     Social History Narrative    None       Imaging:  Scrotal ultrasound from 1/12/2019 reviewed, enlarged hyperemic left epididymis consistent with epididymitis  CT abdomen pelvis from 1/9/2019 revealed bilateral renal cysts, right 2 4 cm and left 3 5 cm    No evidence of pyelonephritis or obstructive uropathy appreciated  Bladder decompressed secondary to Marshall  Imaging reviewed - both report and images personally reviewed  Labs:  Recent Labs      01/19/19   0506  01/21/19   0537   WBC  9 82  9 91     Recent Labs      01/19/19   0506  01/21/19   0537   HGB  11 0*  11 4*       Recent Labs      01/19/19   0506  01/21/19   0526   CREATININE  0 75  0 75       Microbiology:  Urine culture from 1/9/2019 reveals pan susceptible E coli and Strep       Padmini Lomeli PA-C  Date: 1/21/2019 Time: 3:02 PM

## 2019-01-21 NOTE — ASSESSMENT & PLAN NOTE
POA and resolved  Associated with Klebsiella bacteremia likely due to urinary source  Appreciate ID help  Recommend IV Ampicillin through 1/22  Has PICC

## 2019-01-21 NOTE — ASSESSMENT & PLAN NOTE
Status post Marshall catheter decompression after failed voiding trial on 1/13/2019  Plan:    Recommend repeating void trial with Marshall catheter removal tomorrow 1/22/2019 at 6:00 a m  Yudi Harmon

## 2019-01-21 NOTE — PROGRESS NOTES
Progress Note - Júnior Currie 1945, 72 y o  male MRN: [id number]    Unit/Bed#: E5 -01 Encounter: [id number]    Primary Care Provider: No primary care provider on file  Date and time admitted to hospital: 1/9/2019  2:30 AM        * Severe sepsis due to UTI [facility])   Assessment & Plan    POA and resolved  Associated with bacteremia likely due to urinary source  Appreciate ID help  Recommend IV Ampicillin through 1/22  Has PICC     Bacteremia, polymicrobial   Assessment & Plan    Thought to be due to urinary source  On IV abx through 1/22 per ID  Has a PICC line  Waiting for rehab placement - anticipate d/c tomorrow     Acute cystitis without hematuria   Assessment & Plan    Likely caused the Klebsiella bacteremia  abx as above       Urinary retention   Assessment & Plan    Difficult fuller - placed by urology  Now having some bleeding around the fuller  I spoke with Urology, will do a voiding trial tomorrow morning, if fails Urology will replace Fuller and patient will be discharged with Fuller in place; appreciate Urology input         Elevated troponin I level   Assessment & Plan    Due to sepsis, no chest pain     Tobacco abuse   Assessment & Plan    Cessation counseling       VTE Pharmacologic Prophylaxis:   Pharmacologic: none due to urethral bleeding  Mechanical VTE Prophylaxis in Place: Yes    Patient Centered Rounds: I have performed bedside rounds with nursing staff today  Discussions with Specialists or Other Care Team Provider: Urology    Education and Discussions with Family / Patient: Patient    Time Spent for Care: 30 minutes  More than 50% of total time spent on counseling and coordination of care as described above      Current Length of Stay: 12 day(s)    Current Patient Status: Inpatient   Certification Statement: The patient will continue to require additional inpatient hospital stay due to need for close monitoring    Discharge Plan: possibly tomorrow    Code Status: Level 1 - Full Code      Subjective:   Patient seen and examined  He is complaining of bleeding at the site of the Marshall catheter  He denies any pain  He is afebrile  No overnight events  Objective:     Vitals:   Temp (24hrs), Av °F (36 7 °C), Min:97 7 °F (36 5 °C), Max:98 3 °F (36 8 °C)    Temp:  [97 7 °F (36 5 °C)-98 3 °F (36 8 °C)] 98 3 °F (36 8 °C)  HR:  [69-70] 69  Resp:  [18-19] 19  BP: (133-141)/(63-70) 139/68  SpO2:  [95 %-100 %] 100 %  Body mass index is 24 03 kg/m²  Input and Output Summary (last 24 hours): Intake/Output Summary (Last 24 hours) at 19 1708  Last data filed at 19 9586   Gross per 24 hour   Intake                0 ml   Output             2125 ml   Net            -2125 ml       Physical Exam:     Physical Exam   Constitutional: He is oriented to person, place, and time  No distress  HENT:   Head: Normocephalic and atraumatic  Eyes: Conjunctivae are normal    Neck: No JVD present  Cardiovascular: Normal rate and regular rhythm  No murmur heard  Pulmonary/Chest: Effort normal  No respiratory distress  He has no wheezes  He has no rales  Abdominal: Soft  He exhibits no distension  There is no tenderness  There is no guarding  Genitourinary:   Genitourinary Comments: Marshall catheter   Musculoskeletal: He exhibits no edema  Neurological: He is alert and oriented to person, place, and time  Skin:   Multiple tattoos   Psychiatric: He has a normal mood and affect           Additional Data:     Labs:      Results from last 7 days  Lab Units 19  0537   WBC Thousand/uL 9 91   HEMOGLOBIN g/dL 11 4*   HEMATOCRIT % 33 3*   PLATELETS Thousands/uL 407*   NEUTROS PCT % 75   LYMPHS PCT % 13*   MONOS PCT % 10   EOS PCT % 2       Results from last 7 days  Lab Units 19  0526   SODIUM mmol/L 139   POTASSIUM mmol/L 3 7   CHLORIDE mmol/L 102   CO2 mmol/L 27   BUN mg/dL 8   CREATININE mg/dL 0 75   ANION GAP mmol/L 10   CALCIUM mg/dL 8 3   GLUCOSE RANDOM mg/dL 156* Results from last 7 days  Lab Units 01/21/19  1630 01/21/19  1122 01/21/19  0756 01/20/19  2146 01/20/19  1642 01/20/19  1117 01/20/19  0743 01/19/19  2201 01/19/19  1614 01/19/19  1201 01/19/19  0754 01/18/19  2226   POC GLUCOSE mg/dl 233* 203* 164* 213* 236* 228* 194* 261* 297* 284* 184* 230*                 * I Have Reviewed All Lab Data Listed Above  * Additional Pertinent Lab Tests Reviewed: [facility] Admission Reviewed    Imaging:    Imaging Reports Reviewed Today Include: no new    Recent Cultures (last 7 days):           Last 24 Hours Medication List:     Current Facility-Administered Medications:  acetaminophen 650 mg Oral Q6H PRN Marianna Mann PA-C    ampicillin 2,000 mg Intravenous Q6H Sherri Silverman MD Last Rate: 2,000 mg (01/21/19 1659)   aspirin 81 mg Oral Daily Jeff Munoz MD    atorvastatin 80 mg Oral Daily With Angel Pulido MD    bacitracin 1 small application Topical BID Danilo Prechtel, DO    clopidogrel 75 mg Oral Daily Erving Epley, MD    guaiFENesin 200 mg Oral Q4H PRN VARGAS Almaraz    insulin glargine 10 Units Subcutaneous HS Luisana Hutson MD    insulin lispro 1-5 Units Subcutaneous TID AC Luiasna Hutson MD    insulin lispro 1-5 Units Subcutaneous HS Luisana Hutson MD    lisinopril 10 mg Oral Daily Jeff Munoz MD    melatonin 6 mg Oral HS VARGAS Almaraz    melatonin 6 mg Oral PRN VARGAS Echavarria    metoprolol succinate 75 mg Oral Daily Jeff Munoz MD    nicotine 1 patch Transdermal Daily Marianna Mann PA-C    ondansetron 4 mg Intravenous Q4H PRN Delrae Clas Spatzer, CRNP    ranolazine 500 mg Oral Q12H Miguel Narvaez MD    tamsulosin 0 4 mg Oral Daily With Matt Butt MD         Today, Patient Was Seen By: Isabel Garcia MD    ** Please Note: Dictation voice to text software may have been used in the creation of this document   **

## 2019-01-21 NOTE — PLAN OF CARE
Knowledge Deficit     Patient/family/caregiver demonstrates understanding of disease process, treatment plan, medications, and discharge instructions Adequate for Discharge        Nutrition/Hydration-ADULT     Nutrient/Hydration intake appropriate for improving, restoring or maintaining nutritional needs Adequate for Discharge        PAIN - ADULT     Verbalizes/displays adequate comfort level or baseline comfort level Adequate for Discharge        Potential for Falls     Patient will remain free of falls Adequate for Discharge        Prexisting or High Potential for Compromised Skin Integrity     Skin integrity is maintained or improved Adequate for Discharge        RESPIRATORY - ADULT     Achieves optimal ventilation and oxygenation Adequate for Discharge          DISCHARGE PLANNING     Discharge to home or other facility with appropriate resources Progressing        DISCHARGE PLANNING - CARE MANAGEMENT     Discharge to post-acute care or home with appropriate resources Progressing        GENITOURINARY - ADULT     Maintains or returns to baseline urinary function Progressing     Absence of urinary retention Progressing     Urinary catheter remains patent Progressing        INFECTION - ADULT     Absence or prevention of progression during hospitalization Progressing        MUSCULOSKELETAL - ADULT     Maintain or return mobility to safest level of function Progressing     Maintain proper alignment of affected body part Progressing        SAFETY ADULT     Maintain or return to baseline ADL function Progressing     Maintain or return mobility status to optimal level Progressing

## 2019-01-21 NOTE — ASSESSMENT & PLAN NOTE
Lab Results   Component Value Date    HGBA1C 7 5 (H) 01/14/2019       Recent Labs      01/20/19   2146  01/21/19   0756  01/21/19   1122  01/21/19   1630   POCGLU  213*  164*  203*  233*       Blood Sugar Average: Last 72 hrs:  (P) 114 [id number]   Elevated Accu-Cheks - will increase Lantus to 15 units daily and add Humalog 4 units t i d    Continue to monitor Accu-Cheks, insulin sliding scale

## 2019-01-21 NOTE — PHYSICAL THERAPY NOTE
Physical Therapy Treatment Note       01/21/19 5128   Pain Assessment   Pain Assessment 0-10   Pain Score 5   Pain Type Chronic pain   Pain Location Generalized   Pain Orientation Bilateral   Pain Descriptors [facility] Pain Intervention(s) Ambulation/increased activity; Emotional support; Rest   Response to Interventions tolerated fair   Restrictions/Precautions   Other Precautions Multiple lines; Fall Risk;Pain  ((+) fuller cath)   General   Chart Reviewed Yes   Response to Previous Treatment Patient with no complaints from previous session  Family/Caregiver Present No   Cognition   Overall Cognitive Status WFL   Arousal/Participation Alert; Cooperative   Attention Within functional limits   Orientation Level Oriented X4   Memory Within functional limits   Following Commands Follows all commands and directions without difficulty   Subjective   Subjective " I'm still leaking blood "     Bed Mobility   Supine to Sit 5  Supervision   Additional items Assist x 1;HOB elevated; Increased time required; Bedrails   Sit to Supine 4  Minimal assistance   Additional items Assist x 1; Increased time required;LE management   Transfers   Sit to Stand 5  Supervision   Additional items Assist x 1; Armrests; Bedrails; Increased time required   Stand to Sit 4  Minimal assistance   Additional items Assist x 1; Increased time required;Verbal cues; Bedrails   Ambulation/Elevation   Gait pattern Forward Flexion; Wide SHILOH; Excessively slow   Gait Assistance 4  Minimal assist   Additional items Assist x 1;Verbal cues   Assistive Device Rolling walker   Distance 70'x2   Balance   Static Sitting Fair +   Static Standing Fair   Dynamic Standing Fair -   Ambulatory Fair -   Endurance Deficit   Endurance Deficit Yes   Endurance Deficit Description fatigue, pain   Activity Tolerance   Activity Tolerance Patient limited by fatigue;Patient limited by pain; Patient tolerated treatment well   Nurse Made Aware shaila SERRANO   Exercises   Ankle Pumps Supine;15 reps;AROM; Bilateral   Equipment Use   Comments PT continues with foul smelling bloody purulent drainage from penis  Pt 's gown changed x1 with max assist and pt's le's cleaned and new clen slipper socks donned  Assessment   Prognosis Good   Problem List Decreased range of motion;Decreased strength;Decreased endurance; Impaired balance;Decreased mobility; Decreased safety awareness;Pain   Assessment Pt in bed upon arrival  Pt report pain all over 5/10  Pt agreeable to PT>  Pt declined le thr ex due to fuller cath pulling with exercises and discomfort  PT performs bed mobility with supervision with use of bedrails and head of bed elevated  PT performs sit to stand, stand to sit transfers from bed with supervision assist x1 with verbal cues for handplacement and stand to sit with min assist and verbal cues for handpalcement  Pt showing progression with ability to ambulate farther distances of 70'x2with one standing rest break between each gait trial  Pt continues to be limited by fatigue, pt reports fatigue upon returning to bed  PT requires max assist for donning and doffing of gown and slipper socks and washing le's  Pt unable to tolerate sitting out of bed for prolonged periods of time due to discomfort in pt 's buttocks even with soft care cushion  Pt will benefit from continued inpt PT inorder to maximize functional mobility and independence  At this time recommend STR as pt is requiring min assist for transfers stand to sit, ambulation and sit to supine and demonstrates deficits in strength, balance, activity tolerance and mobility  PT returned to supine at conclusion of PT session  Pt declined SCD's  Goals   Patient Goals " To get better "    STG Expiration Date 01/25/19   Treatment Day 2   Plan   Treatment/Interventions Functional transfer training;LE strengthening/ROM; Elevations; Therapeutic exercise; Endurance training;Patient/family training;Equipment eval/education;Gait training; Bed mobility;Spoke to case management   Progress Progressing toward goals   PT Frequency (4-5x/week)   Recommendation   Recommendation Short-term skilled PT   PT - OK to Discharge Yes  (to rehab)       Ольга Paniagua, PTA

## 2019-01-21 NOTE — ASSESSMENT & PLAN NOTE
Bleeding around the urethral meatus, around the Marshall catheter but not into the Marshall catheter bag or any evidence of any further up  bleeding  No hematuria or clots in the Marshall or the Marshall bag  Marshall continues to drain well  Likely secondary to traumatic Marshall catheterization upon reinsertion of Marshall  Plan:   Marshall catheter removal tomorrow morning for voiding trial

## 2019-01-21 NOTE — SOCIAL WORK
[facility] has bed available for pt  CM spoke with pt who stated that he wished for CM to make arrangements with his dtr  CM called and left VM for dtr June to verify dc plan  CM called [employer] and initiated insurance auth for SNF  Pending reference number [id number]  CM awaiting PC from [place] [phone] with fax number to send clinicals and then will fax and await approval     Pt has not been seen by therapy since 1/16 and will need updated notes for insurance auth  CM called PT Sandra Cagle to request that pt be seen  CM following and continuing to work on [facility] [employer]

## 2019-01-21 NOTE — ASSESSMENT & PLAN NOTE
Thought to be due to urinary source  On IV abx through 1/22 per ID  Has a PICC line  Waiting for rehab placement - anticipate d/c tomorrow

## 2019-01-21 NOTE — PLAN OF CARE
Problem: PHYSICAL THERAPY ADULT  Goal: Performs mobility at highest level of function for planned discharge setting  See evaluation for individualized goals  Treatment/Interventions: Functional transfer training, LE strengthening/ROM, Elevations, Therapeutic exercise, Endurance training, Patient/family training, Equipment eval/education, Bed mobility, Gait training, Compensatory technique education, Continued evaluation, Spoke to nursing, Family  Equipment Recommended: Dayan Contreras (possible RW)       See flowsheet documentation for full assessment, interventions and recommendations  Outcome: Progressing  Prognosis: Good  Problem List: Decreased range of motion, Decreased strength, Decreased endurance, Impaired balance, Decreased mobility, Decreased safety awareness, Pain  Assessment: Pt in bed upon arrival  Pt report pain all over 5/10  Pt agreeable to PT>  Pt declined le thr ex due to fuller cath pulling with exercises and discomfort  PT performs bed mobility with supervision with use of bedrails and head of bed elevated  PT performs sit to stand, stand to sit transfers from bed with supervision assist x1 with verbal cues for handplacement and stand to sit with min assist and verbal cues for handpalcement  Pt showing progression with ability to ambulate farther distances of 70'x2with one standing rest break between each gait trial  Pt continues to be limited by fatigue, pt reports fatigue upon returning to bed  PT requires max assist for donning and doffing of gown and slipper socks and washing le's  Pt unable to tolerate sitting out of bed for prolonged periods of time due to discomfort in pt 's buttocks even with soft care cushion  Pt will benefit from continued inpt PT inorder to maximize functional mobility and independence   At this time recommend STR as pt is requiring min assist for transfers stand to sit, ambulation and sit to supine and demonstrates deficits in strength, balance, activity tolerance and mobility  PT returned to supine at conclusion of PT session  Pt declined SCD's  Barriers to Discharge: Inaccessible home environment  Barriers to Discharge Comments: 2 story home  Recommendation: Short-term skilled PT     PT - OK to Discharge: Yes (to rehab)    See flowsheet documentation for full assessment

## 2019-01-22 ENCOUNTER — HOSPITAL ENCOUNTER (INPATIENT)
Facility: HOSPITAL | Age: 74
LOS: 7 days | Discharge: HOME HEALTH | DRG: 091 | End: 2019-01-29
Attending: INTERNAL MEDICINE | Admitting: FAMILY MEDICINE
Payer: COMMERCIAL

## 2019-01-22 VITALS
HEART RATE: 64 BPM | HEIGHT: 68 IN | DIASTOLIC BLOOD PRESSURE: 58 MMHG | RESPIRATION RATE: 18 BRPM | BODY MASS INDEX: 23.96 KG/M2 | WEIGHT: 158.07 LBS | SYSTOLIC BLOOD PRESSURE: 119 MMHG | OXYGEN SATURATION: 96 % | TEMPERATURE: 97.5 F

## 2019-01-22 DIAGNOSIS — I10 HYPERTENSION: ICD-10-CM

## 2019-01-22 DIAGNOSIS — E11.9 DIABETES MELLITUS (HCC): ICD-10-CM

## 2019-01-22 DIAGNOSIS — I25.10 CAD (CORONARY ARTERY DISEASE): ICD-10-CM

## 2019-01-22 DIAGNOSIS — R33.9 URINARY RETENTION: ICD-10-CM

## 2019-01-22 DIAGNOSIS — G47.00 INSOMNIA: Primary | ICD-10-CM

## 2019-01-22 DIAGNOSIS — R26.2 AMBULATORY DYSFUNCTION: ICD-10-CM

## 2019-01-22 LAB
ANION GAP SERPL CALCULATED.3IONS-SCNC: 9 MMOL/L (ref 4–13)
BASOPHILS # BLD AUTO: 0.05 THOUSANDS/ΜL (ref 0–0.1)
BASOPHILS NFR BLD AUTO: 1 % (ref 0–1)
BUN SERPL-MCNC: 10 MG/DL (ref 5–25)
CALCIUM SERPL-MCNC: 8.2 MG/DL (ref 8.3–10.1)
CHLORIDE SERPL-SCNC: 102 MMOL/L (ref 100–108)
CO2 SERPL-SCNC: 25 MMOL/L (ref 21–32)
CREAT SERPL-MCNC: 0.69 MG/DL (ref 0.6–1.3)
EOSINOPHIL # BLD AUTO: 0.14 THOUSAND/ΜL (ref 0–0.61)
EOSINOPHIL NFR BLD AUTO: 2 % (ref 0–6)
ERYTHROCYTE [DISTWIDTH] IN BLOOD BY AUTOMATED COUNT: 13.4 % (ref 11.6–15.1)
GFR SERPL CREATININE-BSD FRML MDRD: 96 ML/MIN/1.73SQ M
GLUCOSE SERPL-MCNC: 130 MG/DL (ref 65–140)
GLUCOSE SERPL-MCNC: 143 MG/DL (ref 65–140)
GLUCOSE SERPL-MCNC: 161 MG/DL (ref 65–140)
GLUCOSE SERPL-MCNC: 171 MG/DL (ref 65–140)
GLUCOSE SERPL-MCNC: 176 MG/DL (ref 70–99)
GLUCOSE SERPL-MCNC: 178 MG/DL (ref 70–99)
HCT VFR BLD AUTO: 33.7 % (ref 36.5–49.3)
HGB BLD-MCNC: 11.1 G/DL (ref 12–17)
IMM GRANULOCYTES # BLD AUTO: 0.03 THOUSAND/UL (ref 0–0.2)
IMM GRANULOCYTES NFR BLD AUTO: 0 % (ref 0–2)
LYMPHOCYTES # BLD AUTO: 1.21 THOUSANDS/ΜL (ref 0.6–4.47)
LYMPHOCYTES NFR BLD AUTO: 13 % (ref 14–44)
MCH RBC QN AUTO: 31.4 PG (ref 26.8–34.3)
MCHC RBC AUTO-ENTMCNC: 32.9 G/DL (ref 31.4–37.4)
MCV RBC AUTO: 96 FL (ref 82–98)
MONOCYTES # BLD AUTO: 0.98 THOUSAND/ΜL (ref 0.17–1.22)
MONOCYTES NFR BLD AUTO: 10 % (ref 4–12)
NEUTROPHILS # BLD AUTO: 7.09 THOUSANDS/ΜL (ref 1.85–7.62)
NEUTS SEG NFR BLD AUTO: 74 % (ref 43–75)
NRBC BLD AUTO-RTO: 0 /100 WBCS
PLATELET # BLD AUTO: 394 THOUSANDS/UL (ref 149–390)
PMV BLD AUTO: 9.4 FL (ref 8.9–12.7)
POTASSIUM SERPL-SCNC: 3.9 MMOL/L (ref 3.5–5.3)
RBC # BLD AUTO: 3.53 MILLION/UL (ref 3.88–5.62)
SODIUM SERPL-SCNC: 136 MMOL/L (ref 136–145)
WBC # BLD AUTO: 9.5 THOUSAND/UL (ref 4.31–10.16)

## 2019-01-22 PROCEDURE — 82948 REAGENT STRIP/BLOOD GLUCOSE: CPT

## 2019-01-22 PROCEDURE — 85025 COMPLETE CBC W/AUTO DIFF WBC: CPT | Performed by: FAMILY MEDICINE

## 2019-01-22 PROCEDURE — 99239 HOSP IP/OBS DSCHRG MGMT >30: CPT | Performed by: FAMILY MEDICINE

## 2019-01-22 PROCEDURE — 99306 1ST NF CARE HIGH MDM 50: CPT | Performed by: INTERNAL MEDICINE

## 2019-01-22 PROCEDURE — 80048 BASIC METABOLIC PNL TOTAL CA: CPT | Performed by: FAMILY MEDICINE

## 2019-01-22 RX ORDER — RANOLAZINE 500 MG/1
500 TABLET, EXTENDED RELEASE ORAL EVERY 12 HOURS SCHEDULED
Status: CANCELLED | OUTPATIENT
Start: 2019-01-22

## 2019-01-22 RX ORDER — GUAIFENESIN 100 MG/5ML
200 SOLUTION ORAL EVERY 4 HOURS PRN
Status: CANCELLED | OUTPATIENT
Start: 2019-01-22

## 2019-01-22 RX ORDER — NICOTINE 21 MG/24HR
1 PATCH, TRANSDERMAL 24 HOURS TRANSDERMAL DAILY
Status: DISCONTINUED | OUTPATIENT
Start: 2019-01-23 | End: 2019-01-29 | Stop reason: HOSPADM

## 2019-01-22 RX ORDER — ACETAMINOPHEN 325 MG/1
650 TABLET ORAL EVERY 6 HOURS PRN
Status: DISCONTINUED | OUTPATIENT
Start: 2019-01-22 | End: 2019-01-29 | Stop reason: HOSPADM

## 2019-01-22 RX ORDER — LISINOPRIL 10 MG/1
10 TABLET ORAL DAILY
Status: CANCELLED | OUTPATIENT
Start: 2019-01-23

## 2019-01-22 RX ORDER — TAMSULOSIN HYDROCHLORIDE 0.4 MG/1
0.4 CAPSULE ORAL
Status: DISCONTINUED | OUTPATIENT
Start: 2019-01-22 | End: 2019-01-25

## 2019-01-22 RX ORDER — TAMSULOSIN HYDROCHLORIDE 0.4 MG/1
0.4 CAPSULE ORAL
Status: CANCELLED | OUTPATIENT
Start: 2019-01-22

## 2019-01-22 RX ORDER — LANOLIN ALCOHOL/MO/W.PET/CERES
6 CREAM (GRAM) TOPICAL
Status: DISCONTINUED | OUTPATIENT
Start: 2019-01-22 | End: 2019-01-29 | Stop reason: HOSPADM

## 2019-01-22 RX ORDER — CLOPIDOGREL BISULFATE 75 MG/1
75 TABLET ORAL DAILY
Status: DISCONTINUED | OUTPATIENT
Start: 2019-01-23 | End: 2019-01-29 | Stop reason: HOSPADM

## 2019-01-22 RX ORDER — ONDANSETRON 2 MG/ML
4 INJECTION INTRAMUSCULAR; INTRAVENOUS EVERY 4 HOURS PRN
Status: DISCONTINUED | OUTPATIENT
Start: 2019-01-22 | End: 2019-01-22

## 2019-01-22 RX ORDER — GUAIFENESIN 100 MG/5ML
200 SOLUTION ORAL EVERY 4 HOURS PRN
Status: DISCONTINUED | OUTPATIENT
Start: 2019-01-22 | End: 2019-01-29 | Stop reason: HOSPADM

## 2019-01-22 RX ORDER — ACETAMINOPHEN 325 MG/1
650 TABLET ORAL EVERY 6 HOURS PRN
Status: CANCELLED | OUTPATIENT
Start: 2019-01-22

## 2019-01-22 RX ORDER — RANOLAZINE 500 MG/1
500 TABLET, EXTENDED RELEASE ORAL EVERY 12 HOURS SCHEDULED
Status: DISCONTINUED | OUTPATIENT
Start: 2019-01-22 | End: 2019-01-29 | Stop reason: HOSPADM

## 2019-01-22 RX ORDER — ASPIRIN 81 MG/1
81 TABLET ORAL DAILY
Status: CANCELLED | OUTPATIENT
Start: 2019-01-23

## 2019-01-22 RX ORDER — INSULIN GLARGINE 100 [IU]/ML
15 INJECTION, SOLUTION SUBCUTANEOUS
Status: DISCONTINUED | OUTPATIENT
Start: 2019-01-22 | End: 2019-01-25

## 2019-01-22 RX ORDER — LANOLIN ALCOHOL/MO/W.PET/CERES
6 CREAM (GRAM) TOPICAL AS NEEDED
Status: CANCELLED | OUTPATIENT
Start: 2019-01-22

## 2019-01-22 RX ORDER — GINSENG 100 MG
1 CAPSULE ORAL 2 TIMES DAILY
Status: CANCELLED | OUTPATIENT
Start: 2019-01-22

## 2019-01-22 RX ORDER — LANOLIN ALCOHOL/MO/W.PET/CERES
6 CREAM (GRAM) TOPICAL AS NEEDED
Status: DISCONTINUED | OUTPATIENT
Start: 2019-01-22 | End: 2019-01-29 | Stop reason: HOSPADM

## 2019-01-22 RX ORDER — ATORVASTATIN CALCIUM 80 MG/1
80 TABLET, FILM COATED ORAL
Status: DISCONTINUED | OUTPATIENT
Start: 2019-01-22 | End: 2019-01-29 | Stop reason: HOSPADM

## 2019-01-22 RX ORDER — INSULIN GLARGINE 100 [IU]/ML
15 INJECTION, SOLUTION SUBCUTANEOUS
Status: CANCELLED | OUTPATIENT
Start: 2019-01-22

## 2019-01-22 RX ORDER — CLOPIDOGREL BISULFATE 75 MG/1
75 TABLET ORAL DAILY
Status: CANCELLED | OUTPATIENT
Start: 2019-01-23

## 2019-01-22 RX ORDER — GINSENG 100 MG
1 CAPSULE ORAL 2 TIMES DAILY
Status: DISCONTINUED | OUTPATIENT
Start: 2019-01-22 | End: 2019-01-29 | Stop reason: HOSPADM

## 2019-01-22 RX ORDER — LANOLIN ALCOHOL/MO/W.PET/CERES
6 CREAM (GRAM) TOPICAL
Status: CANCELLED | OUTPATIENT
Start: 2019-01-22

## 2019-01-22 RX ORDER — NICOTINE 21 MG/24HR
1 PATCH, TRANSDERMAL 24 HOURS TRANSDERMAL DAILY
Status: CANCELLED | OUTPATIENT
Start: 2019-01-23

## 2019-01-22 RX ORDER — ASPIRIN 81 MG/1
81 TABLET ORAL DAILY
Status: DISCONTINUED | OUTPATIENT
Start: 2019-01-23 | End: 2019-01-29 | Stop reason: HOSPADM

## 2019-01-22 RX ORDER — ONDANSETRON 2 MG/ML
4 INJECTION INTRAMUSCULAR; INTRAVENOUS EVERY 4 HOURS PRN
Status: CANCELLED | OUTPATIENT
Start: 2019-01-22

## 2019-01-22 RX ORDER — LISINOPRIL 10 MG/1
10 TABLET ORAL DAILY
Status: DISCONTINUED | OUTPATIENT
Start: 2019-01-23 | End: 2019-01-29 | Stop reason: HOSPADM

## 2019-01-22 RX ORDER — ATORVASTATIN CALCIUM 80 MG/1
80 TABLET, FILM COATED ORAL
Status: CANCELLED | OUTPATIENT
Start: 2019-01-22

## 2019-01-22 RX ADMIN — INSULIN LISPRO 4 UNITS: 100 INJECTION, SOLUTION INTRAVENOUS; SUBCUTANEOUS at 17:29

## 2019-01-22 RX ADMIN — BACITRACIN ZINC 1 SMALL APPLICATION: 500 OINTMENT TOPICAL at 17:30

## 2019-01-22 RX ADMIN — NICOTINE 1 PATCH: 21 PATCH, EXTENDED RELEASE TRANSDERMAL at 09:29

## 2019-01-22 RX ADMIN — AMPICILLIN SODIUM 2000 MG: 2 INJECTION, POWDER, FOR SOLUTION INTRAMUSCULAR; INTRAVENOUS at 12:15

## 2019-01-22 RX ADMIN — INSULIN LISPRO 4 UNITS: 100 INJECTION, SOLUTION INTRAVENOUS; SUBCUTANEOUS at 09:26

## 2019-01-22 RX ADMIN — METOPROLOL SUCCINATE 75 MG: 25 TABLET, EXTENDED RELEASE ORAL at 09:27

## 2019-01-22 RX ADMIN — INSULIN GLARGINE 15 UNITS: 100 INJECTION, SOLUTION SUBCUTANEOUS at 21:20

## 2019-01-22 RX ADMIN — INSULIN LISPRO 1 UNITS: 100 INJECTION, SOLUTION INTRAVENOUS; SUBCUTANEOUS at 17:29

## 2019-01-22 RX ADMIN — TAMSULOSIN HYDROCHLORIDE 0.4 MG: 0.4 CAPSULE ORAL at 16:37

## 2019-01-22 RX ADMIN — RANOLAZINE 500 MG: 500 TABLET, FILM COATED, EXTENDED RELEASE ORAL at 09:27

## 2019-01-22 RX ADMIN — INSULIN LISPRO 4 UNITS: 100 INJECTION, SOLUTION INTRAVENOUS; SUBCUTANEOUS at 12:14

## 2019-01-22 RX ADMIN — ATORVASTATIN CALCIUM 80 MG: 80 TABLET, FILM COATED ORAL at 16:37

## 2019-01-22 RX ADMIN — LISINOPRIL 10 MG: 10 TABLET ORAL at 09:27

## 2019-01-22 RX ADMIN — ASPIRIN 81 MG: 81 TABLET, COATED ORAL at 09:28

## 2019-01-22 RX ADMIN — CLOPIDOGREL BISULFATE 75 MG: 75 TABLET ORAL at 09:27

## 2019-01-22 RX ADMIN — AMPICILLIN SODIUM 2000 MG: 2 INJECTION, POWDER, FOR SOLUTION INTRAMUSCULAR; INTRAVENOUS at 05:17

## 2019-01-22 RX ADMIN — INSULIN LISPRO 1 UNITS: 100 INJECTION, SOLUTION INTRAVENOUS; SUBCUTANEOUS at 21:21

## 2019-01-22 RX ADMIN — INSULIN LISPRO 1 UNITS: 100 INJECTION, SOLUTION INTRAVENOUS; SUBCUTANEOUS at 06:25

## 2019-01-22 RX ADMIN — MELATONIN TAB 3 MG 6 MG: 3 TAB at 21:19

## 2019-01-22 RX ADMIN — TUBERCULIN PURIFIED PROTEIN DERIVATIVE 5 UNITS: 5 INJECTION, SOLUTION INTRADERMAL at 21:19

## 2019-01-22 RX ADMIN — RANOLAZINE 500 MG: 500 TABLET, FILM COATED, EXTENDED RELEASE ORAL at 21:19

## 2019-01-22 RX ADMIN — BACITRACIN ZINC 1 SMALL APPLICATION: 500 OINTMENT TOPICAL at 09:28

## 2019-01-22 RX ADMIN — AMPICILLIN SODIUM 2000 MG: 2 INJECTION, POWDER, FOR SOLUTION INTRAMUSCULAR; INTRAVENOUS at 18:41

## 2019-01-22 NOTE — H&P
REHAB H&P- Wilman Fausta 1945, 72 y o  male MRN: [id number]    Unit/Bed#: -91 Encounter: [id number]    Primary Care Provider: No primary care provider on file  Date and time admitted to hospital: 1/22/2019  3:30 PM        Assessment and Plan  * Ambulatory dysfunction   Assessment & Plan    Ambulatory dysfunction secondary to bacteremia and sepsis  At baseline lives alone in her own house  PT/OT to evaluate and treat     History of bacteremia   Assessment & Plan    Recently admitted at [facility] from 1/9/2019 to 1/22/2019 with polymicrobial sepsis/bacteremia with e  Coli, streptococcus and actinomyces secondary to UTI/epididymitis  Per ID recommendation is 14 days of IV antibiotics currently on ampicillin last days today  Discontinue PICC after today's dose     Diabetes mellitus [facility])   Assessment & Plan    Lab Results   Component Value Date    HGBA1C 7 5 (H) 01/14/2019       Recent Labs      01/21/19   2227  01/22/19   0559  01/22/19   0714  01/22/19   1134   POCGLU  184*  171*  143*  130     Diabetes mellitus  At home on glargine 40 units daily  Continue current dose at 15 units daily and lispro t i d  Hypertension   Assessment & Plan    Essential hypertension  Continue lisinopril and metoprolol  Can consider restarting patient's home dose of HCTZ a blood pressures elevate     Hyperlipidemia   Assessment & Plan    Continue atorvastatin     Moderate aortic stenosis   Assessment & Plan    Moderate aortic stenosis     PAD (peripheral artery disease) ([facility])   Assessment & Plan    PAD with history of lower extremity intervention  Continue cilostazol     CAD (coronary artery disease)   Assessment & Plan    Coronary artery disease status post CABG; no chest pain  Continue aspirin clopidogrel isosorbide and ranexa     Urinary retention   Assessment & Plan    Patient had urinary retention and catheter was removed today by urology    Continue tamsulosin and voiding trial     VTE Prophylaxis: Pharmacologic VTE Prophylaxis contraindicated due to hematuria  Code Status: Level 1 - Full Code  Anticipated Length of Stay:  Patient will be admitted on an SNF Short Term Inpatient     Justification for Hospital Stay: Ambulatory dysfunction  Total Time for Visit, including Counseling / Coordination of Care: 45 mins  Greater than 50% of this total time spent on direct patient counseling and coordination of care  Chief Complaint:     Ambulatory dysfunction    History of Present Illness:    Bala Quevedo is a 72 y o  male who presents here for rehab  The patient had prolonged hospitalization at [facility] from 1/9/2019 into 1/22/2019 for polymicrobial bacteremia/sepsis secondary to UTI and epididymitis  He is on day 13/14 of IV antibiotics and unfortunately is debilitated  He is here for rehabilitation  Patient at [facility] lives alone      Review of Systems:  History obtained from chart review and the patient  General ROS: negative for - chills or fever  Psychological ROS: negative for - depression or hostility  Ophthalmic ROS: negative for - blurry vision or dry eyes  Respiratory ROS: negative for - cough  Cardiovascular ROS: negative for - chest pain  Gastrointestinal ROS: negative for - diarrhea, gas/bloating or heartburn  Genito-Urinary ROS: positive for - dysuria  Musculoskeletal ROS: positive for - muscular weakness  Neurological ROS: positive for - ambulatory dysfunction  Otherwise, all other 12 point review of systems normal     Past Medical and Surgical History:   Past Medical History:   Diagnosis Date    CAD (coronary artery disease)     [facility] obstructive [facility] [facility]     Diabetes mellitus [facility]     History of bacteremia     Hyperlipidemia     Hypertension     Moderate aortic stenosis     [facility]     UTI (urinary tract infection)      Past Surgical History:   Procedure Laterality Date    CORONARY ARTERY BYPASS GRAFT Meds/Allergies: Allergies: No Known Allergies  Prior to Admission Medications   Prescriptions Last Dose Informant Patient Reported? Taking?   aspirin 81 mg chewable tablet 1/22/2019 at Unknown time  Yes Yes   Sig: Chew 81 mg daily   atorvastatin (LIPITOR) 80 mg tablet 1/21/2019 at Unknown time  Yes Yes   Sig: Take 80 mg by mouth daily   cilostazol (PLETAL) 100 mg tablet 1/22/2019 at Unknown time  Yes Yes   Sig: Take 100 mg by mouth 2 (two) times a day   clopidogrel (PLAVIX) 75 mg tablet 1/22/2019 at Unknown time  Yes Yes   Sig: Take 75 mg by mouth daily   ferrous sulfate 325 (65 Fe) mg tablet 1/22/2019 at Unknown time  Yes Yes   Sig: Take 325 mg by mouth 2 (two) times a day with meals   hydrochlorothiazide (HYDRODIURIL) 25 mg tablet 1/22/2019 at Unknown time  Yes Yes   Sig: Take 25 mg by mouth daily   insulin glargine (LANTUS) 100 units/mL subcutaneous injection Unknown at Unknown time  Yes No   Sig: Inject 40 Units under the skin daily afternoon   isosorbide mononitrate (IMDUR) 30 mg 24 hr tablet 1/22/2019 at Unknown time  Yes Yes   Sig: Take 30 mg by mouth daily   lisinopril (ZESTRIL) 10 mg tablet 1/22/2019 at Unknown time  Yes Yes   Sig: Take 10 mg by mouth daily   metoprolol tartrate (LOPRESSOR) 50 mg tablet 1/22/2019 at Unknown time  Yes Yes   Sig: Take 75 mg by mouth every 12 (twelve) hours   omeprazole (PriLOSEC) 20 mg delayed release capsule 1/22/2019 at Unknown time  Yes Yes   Sig: Take 20 mg by mouth 2 (two) times a day      Facility-Administered Medications: None     Social History:     Social History     Social History    Marital status: Unknown     Spouse name: N/A    Number of children: N/A    Years of education: N/A     Occupational History    Not on file       Social History Main Topics    Smoking status: Current Every Day Smoker    Smokeless tobacco: Not on file    Alcohol use Not on file    Drug use: Unknown    Sexual activity: Not on file     Other Topics Concern    Not on file Social History Narrative    No narrative on file     Patient Pre-hospital Living Situation: Lives in own house  Patient Pre-hospital Level of Mobility:   Patient Pre-hospital Diet Restrictions:     Family History:  History reviewed  No pertinent family history  Physical Exam:   Vitals:   Blood Pressure: 136/65 (01/22/19 1601)  Pulse: 68 (01/22/19 1601)  Temperature: 97 8 °F (36 6 °C) (01/22/19 1559)  Temp Source: Temporal (01/22/19 1559)  Respirations: 20 (01/22/19 1559)  SpO2: 100 % (01/22/19 1559)    General appearance: alert, appears stated age and cooperative  Skin: Skin color, texture, turgor normal  No rashes or lesions  Head: atraumatic  Eyes: conjunctivae/corneas clear  PERRL, EOM's intact  Lungs: diminished breath sounds  Heart: regular rate and rhythm  Abdomen: soft, non-tender; bowel sounds normal; no masses,  no organomegaly  Back: negative, range of motion normal  Extremities: edema trace lower extremities bilaterally  Neurologic: Grossly normal  Psychiatric: good eye contact, mood appropriate    Lab Results: I have personally reviewed pertinent reports        Results from last 7 days  Lab Units 01/22/19  0517   WBC Thousand/uL 9 50   HEMOGLOBIN g/dL 11 1*   HEMATOCRIT % 33 7*   PLATELETS Thousands/uL 394*   NEUTROS PCT % 74   LYMPHS PCT % 13*   MONOS PCT % 10   EOS PCT % 2       Results from last 7 days  Lab Units 01/22/19  0517 01/21/19  0526 01/19/19  0506 01/18/19  0536 01/17/19  0507 01/16/19  0539   SODIUM mmol/L 136 139 136 135* 134* 133*   POTASSIUM mmol/L 3 9 3 7 3 8 3 8 3 4* 3 2*   CHLORIDE mmol/L 102 102 102 101 99* 98*   CO2 mmol/L 25 27 26 27 26 28   ANION GAP mmol/L 9 10 8 7 9 7   BUN mg/dL 10 8 12 13 11 8   CREATININE mg/dL 0 69 0 75 0 75 0 72 0 71 0 69   CALCIUM mg/dL 8 2* 8 3 7 9* 8 6 9 0 8 1*   EGFR ml/min/1 73sq m 96 92 92 94 94 96   GLUCOSE RANDOM mg/dL 161* 156* 193* 179* 238* 212*       Results from last 7 days  Lab Units 01/18/19  0536   NT-PRO BNP pg/mL 12,791* Imaging: I have personally reviewed pertinent films in PACS  Ct Abdomen Pelvis Wo Contrast  Result Date: 1/9/2019  Impression: 1  Findings suggestive of mild gastritis  No evidence of bowel obstruction, colitis or diverticulitis  2   Suggestion of sludge and/or tiny gallstones in the gallbladder without evidence of acute cholecystitis or biliary obstruction  Workstation performed: [id number]     Us Scrotum And Testicles  Result Date: 1/12/2019  Impression:  Enlarged hyperemic left epididymis consistent with epididymitis  Workstation performed: [id number]     Cta Ed Chest Pe Study  Result Date: 1/9/2019  Impression: 1  Motion limited examination  No evidence of pulmonary embolism to the proximal segmental level  2   Emphysematous changes of lungs with linear atelectasis in the right lower lobe  No focal consolidation  3   A small calcified left hilar lymph node is seen which may be due to sequela of prior granulomatous disease  4   Large noncalcified plaques are seen within the proximal abdominal aorta with areas of ulceration  Workstation performed: [id number]       EKG, Pathology, and Other Studies Reviewed on Admission:   Reviewed previous records    Allscripts/ [facility] Records Reviewed: Yes    ** Please Note: This note has been constructed using a voice recognition system   **

## 2019-01-22 NOTE — ASSESSMENT & PLAN NOTE
Patient undergoing voiding trial and was able to void spontaneously  Marshall catheter was removed at 6 o'clock this morning  I spoke with Urology, if again develops urinary retention Urology will again place Marshall and would be able to do that at [address]

## 2019-01-22 NOTE — ASSESSMENT & PLAN NOTE
Recently admitted at [facility] from 1/9/2019 to 1/22/2019 with polymicrobial sepsis/bacteremia with e  Coli, streptococcus and actinomyces secondary to UTI/epididymitis  Per ID recommendation is 14 days of IV antibiotics currently on ampicillin last days today    Discontinue PICC after today's dose

## 2019-01-22 NOTE — ASSESSMENT & PLAN NOTE
Thought to be due to urinary source  On IV abx through 1/22 per ID - 2 more doses upon discharge, then PICC line can be removed

## 2019-01-22 NOTE — ASSESSMENT & PLAN NOTE
Patient had urinary retention and catheter was removed today by urology    Continue tamsulosin and voiding trial

## 2019-01-22 NOTE — ASSESSMENT & PLAN NOTE
Ambulatory dysfunction secondary to bacteremia and sepsis  At baseline lives alone in her own house    PT/OT to evaluate and treat

## 2019-01-22 NOTE — ASSESSMENT & PLAN NOTE
Patient completing course of antibiotics today 1/22 via PICC line - to be removed after last dose  S/p Marshall catheter removal and voiding spontaneously

## 2019-01-22 NOTE — DISCHARGE SUMMARY
Discharge- David Hayes 1945, 72 y o  male MRN: [id number]    Unit/Bed#: E5 -01 Encounter: [id number]    Primary Care Provider: No primary care provider on file     Date and time admitted to hospital: 1/9/2019  2:30 AM        * Severe sepsis due to polymicrobial bacteremia/UTI [facility])   Assessment & Plan    POA and resolved  Appreciate ID help  Recommend IV Ampicillin through 1/22 - 2 more doses then PICC line can be removed       Bacteremia, polymicrobial   Assessment & Plan    Thought to be due to urinary source  On IV abx through 1/22 per ID - 2 more doses upon discharge, then PICC line can be removed       Acute cystitis without hematuria   Assessment & Plan    Patient completing course of antibiotics today 1/22 via PICC line - to be removed after last dose  S/p Marshall catheter removal and voiding spontaneously        Urinary retention   Assessment & Plan    Patient undergoing voiding trial and was able to void spontaneously  Marshall catheter was removed at 6 o'clock this morning  I spoke with Urology, if again develops urinary retention Urology will again place Marshall and would be able to do that at [facility]     Urethral bleeding   Assessment & Plan    The patient had mild urethral bleeding the site Marshall insertion likely due to traumatic insertion  Patient undergo a voiding trial, and again developed retention Urology will come out to [facility] to [facility] to reinsert Marshall catheter - I personally spoke to Urology regarding this     Type 2 diabetes mellitus with hyperglycemia, with long-term current use of insulin [facility])   Assessment & Plan    Lab Results   Component Value Date    HGBA1C 7 5 (H) 01/14/2019       Recent Labs      01/21/19   2227  01/22/19   0559  01/22/19   0714  01/22/19   1134   POCGLU  184*  171*  143*  130       Blood Sugar Average: Last 72 hrs:  (P) 426 [id number]   Accu-Cheks imporved wiht increase of Lantus to 15 units daily and addition of Humalog 4 units t i d  Continue to monitor Accu-Cheks, insulin sliding scale upon discharge     Elevated troponin I level   Assessment & Plan    Due to sepsis, no chest pain     Lactic acidosis   Assessment & Plan    POA and resolved, was due to sepsis     Tobacco abuse   Assessment & Plan    Cessation counseling       Discharging Physician / Practitioner: Paula Thornton MD  PCP: No primary care provider on file  Admission Date:   Admission Orders     Ordered        01/09/19 0432  Inpatient Admission (expected length of stay for this patient is greater than two midnights)  Once             Discharge Date: 01/22/19    Resolved Problems  Date Reviewed: 1/22/2019    None          Consultations During Hospital Stay:  · Urology    Procedures Performed:   · Marshall catheter placement and removal    Significant Findings / Test Results:   US scrotum and testicles   Final Result by Abram Pascual MD (01/12 0957)       Enlarged hyperemic left epididymis consistent with epididymitis  Workstation performed: [id number]         XR chest portable ICU   Final Result by Loreto Camara DO (01/11 3058)   Evolving right lower lobe infiltrate, most compatible with early pneumonia  The study was marked in [facility] for immediate notification  Workstation performed: [id number]         CT abdomen pelvis wo contrast   Final Result by Judith Faust MD (01/09 0717)         1  Findings suggestive of mild gastritis  No evidence of bowel obstruction, colitis or diverticulitis  2   Suggestion of sludge and/or tiny gallstones in the gallbladder without evidence of acute cholecystitis or biliary obstruction  Workstation performed: [id number]         XR chest portable   Final Result by Catia Carrion DO (01/09 1388)      No acute cardiopulmonary disease  Workstation performed: [id number]         CTA ED chest PE study   Final Result by Chris Nice MD (01/09 2485)      1  Motion limited examination    No evidence of pulmonary embolism to the proximal segmental level  2   Emphysematous changes of lungs with linear atelectasis in the right lower lobe  No focal consolidation  3   A small calcified left hilar lymph node is seen which may be due to sequela of prior granulomatous disease  4   Large noncalcified plaques are seen within the proximal abdominal aorta with areas of ulceration  Workstation performed: [id number]             Results from last 7 days  Lab Units 01/22/19  0517   WBC Thousand/uL 9 50   HEMOGLOBIN g/dL 11 1*   HEMATOCRIT % 33 7*   PLATELETS Thousands/uL 394*       Results from last 7 days  Lab Units 01/22/19  0517   SODIUM mmol/L 136   CHLORIDE mmol/L 102   CO2 mmol/L 25   BUN mg/dL 10   CREATININE mg/dL 0 69   CALCIUM mg/dL 8 2*       Incidental Findings:   · None      Test Results Pending at Discharge (will require follow up): · None     Outpatient Tests Requested:  · N/a    Complications:  urethral bleeding    Reason for Admission: Sepsis    Hospital Course:     Ed Swartz is a 72 y o  male patient history of hypertension and diabetes who originally presented to the hospital on 1/9/2019 due to shortness of breath  The patient was found to have sepsis along with Klebsiella bacteremia most likely urinary source  The patient had been on antibiotics throughout his hospitalization and is to complete course of antibiotics today, 2 additional doses upon discharge to transitional care facility at [facility]   During hospitalization the patient was noted for urinary retention and a Marshall catheter was placed by Urology to to difficult catheter placement suspected due to chordae, this subsequently left to peer urethral bleeding with Marshall catheter subsequently removed for a voiding trial, patient was able to subsequently void spontaneously  The patient continued to complain of generalized weakness on the day of his discharge, however, improved and no specific complaints    The patient is being transferred to a transitional care facility for additional strengthening and close medical follow-up  Discharge instructions discussed with the patient and patient's daughter home verbalized understanding  Please see above list of diagnoses and related plan for additional information  Condition at Discharge: stable     Discharge Day Visit / Exam:     Subjective:  Patient seen and examined  His Marshall catheter was removed this morning and the patient was able to void spontaneously times 1  He denies any pain and states that his urethral bleeding mostly stopped  Patient denies chest pain, shortness of breath or palpitations  He denies nausea, vomiting, diarrhea, constipation or abdominal pain  He reports fair appetite  No overnight events  Vitals: Blood Pressure: 119/58 (01/22/19 0824)  Pulse: 64 (01/22/19 0824)  Temperature: 97 5 °F (36 4 °C) (01/22/19 0824)  Temp Source: Temporal (01/22/19 0824)  Respirations: 18 (01/22/19 0824)  Height: 5' 8" (172 7 cm) (01/09/19 1123)  Weight - Scale: 71 7 kg (158 lb 1 1 oz) (01/10/19 0537)  SpO2: 96 % (01/22/19 0824)    Exam:     Physical Exam   Constitutional: He is oriented to person, place, and time  No distress  HENT:   Head: Normocephalic and atraumatic  Eyes: Conjunctivae are normal    Neck: No JVD present  Cardiovascular: Normal rate and regular rhythm  No murmur heard  Pulmonary/Chest: Effort normal  No respiratory distress  He has no wheezes  He has no rales  Abdominal: Soft  He exhibits no distension  There is no tenderness  There is no guarding  Musculoskeletal: He exhibits no edema  Neurological: He is alert and oriented to person, place, and time  Skin: Skin is warm and dry  Psychiatric: He has a normal mood and affect  Discussion with Family: Daughter Danii Krueger    Discharge instructions/Information to patient and family:   See after visit summary for information provided to patient and family        Provisions for Follow-Up Care:  See after visit summary for information related to follow-up care and any pertinent home health orders  Disposition:     Other: TCF at [facility] to [facility] SNF:   · Not Applicable to this Patient - Not Applicable to this Patient    Planned Readmission: No     Discharge Statement:  I spent 35 minutes discharging the patient  This time was spent on the day of discharge  I had direct contact with the patient on the day of discharge  Greater than 50% of the total time was spent examining patient, answering all patient questions, arranging and discussing plan of care with patient as well as directly providing post-discharge instructions  Additional time then spent on discharge activities  Discharge Medications:  See after visit summary for reconciled discharge medications provided to patient and family        ** Please Note: This note has been constructed using a voice recognition system **

## 2019-01-22 NOTE — ASSESSMENT & PLAN NOTE
Lab Results   Component Value Date    HGBA1C 7 5 (H) 01/14/2019       Recent Labs      01/21/19   2227  01/22/19   0559  01/22/19   0714  01/22/19   1134   POCGLU  184*  171*  143*  130     Diabetes mellitus  At home on glargine 40 units daily  Continue current dose at 15 units daily and lispro t i d

## 2019-01-22 NOTE — ASSESSMENT & PLAN NOTE
Essential hypertension  Continue lisinopril and metoprolol    Can consider restarting patient's home dose of HCTZ a blood pressures elevate

## 2019-01-22 NOTE — ASSESSMENT & PLAN NOTE
POA and resolved  Associated with Klebsiella bacteremia likely due to urinary source  Appreciate ID help  Recommend IV Ampicillin through 1/22 - 2 more doses then PICC line can be removed

## 2019-01-22 NOTE — ASSESSMENT & PLAN NOTE
Coronary artery disease status post CABG; no chest pain    Continue aspirin clopidogrel isosorbide and ranexa

## 2019-01-22 NOTE — SOCIAL WORK
Call received from Bing Chowdary from [facility] with auth#  [id number]#[id number] with next review date 1/29 with Fabiola Musa [phone] [phone]  This information was provided to Jp Vega at [facility]  Wheelchair transport setup for 2:30 with Valarie  Pt and his daughter Maria Elena Gonzalez informed  IMM notice explained and copy provided to pt  Contact information for RN report placed in Epic

## 2019-01-22 NOTE — TELEPHONE ENCOUNTER
Patient remains in patient  Per note from Lucas Stock PA-C from yesterday, void trial to be attempted today  Will monitor and schedule as appropriate

## 2019-01-22 NOTE — ASSESSMENT & PLAN NOTE
Lab Results   Component Value Date    HGBA1C 7 5 (H) 01/14/2019       Recent Labs      01/21/19   2227  01/22/19   0559  01/22/19   0714  01/22/19   1134   POCGLU  184*  171*  143*  130       Blood Sugar Average: Last 72 hrs:  (P) 593 [id number]   Accu-Cheks imporved wiht increase of Lantus to 15 units daily and addition of Humalog 4 units t i d    Continue to monitor Accu-Cheks, insulin sliding scale upon discharge

## 2019-01-22 NOTE — ASSESSMENT & PLAN NOTE
The patient had mild urethral bleeding the site Marshall insertion likely due to traumatic insertion  Patient undergo a voiding trial, and again developed retention Urology will come out to [facility] to [facility] to reinsert Marshall catheter - I personally spoke to Urology regarding this

## 2019-01-23 LAB
GLUCOSE SERPL-MCNC: 142 MG/DL (ref 70–99)
GLUCOSE SERPL-MCNC: 166 MG/DL (ref 70–99)
GLUCOSE SERPL-MCNC: 183 MG/DL (ref 70–99)
GLUCOSE SERPL-MCNC: 210 MG/DL (ref 70–99)

## 2019-01-23 PROCEDURE — 97116 GAIT TRAINING THERAPY: CPT

## 2019-01-23 PROCEDURE — 97535 SELF CARE MNGMENT TRAINING: CPT

## 2019-01-23 PROCEDURE — 97530 THERAPEUTIC ACTIVITIES: CPT

## 2019-01-23 PROCEDURE — 82948 REAGENT STRIP/BLOOD GLUCOSE: CPT

## 2019-01-23 PROCEDURE — 97166 OT EVAL MOD COMPLEX 45 MIN: CPT

## 2019-01-23 PROCEDURE — 97163 PT EVAL HIGH COMPLEX 45 MIN: CPT

## 2019-01-23 RX ADMIN — AMPICILLIN SODIUM 2000 MG: 2 INJECTION, POWDER, FOR SOLUTION INTRAMUSCULAR; INTRAVENOUS at 00:16

## 2019-01-23 RX ADMIN — INSULIN LISPRO 1 UNITS: 100 INJECTION, SOLUTION INTRAVENOUS; SUBCUTANEOUS at 17:32

## 2019-01-23 RX ADMIN — BACITRACIN ZINC 1 SMALL APPLICATION: 500 OINTMENT TOPICAL at 17:59

## 2019-01-23 RX ADMIN — INSULIN LISPRO 4 UNITS: 100 INJECTION, SOLUTION INTRAVENOUS; SUBCUTANEOUS at 08:03

## 2019-01-23 RX ADMIN — INSULIN LISPRO 2 UNITS: 100 INJECTION, SOLUTION INTRAVENOUS; SUBCUTANEOUS at 21:32

## 2019-01-23 RX ADMIN — INSULIN LISPRO 4 UNITS: 100 INJECTION, SOLUTION INTRAVENOUS; SUBCUTANEOUS at 17:31

## 2019-01-23 RX ADMIN — ATORVASTATIN CALCIUM 80 MG: 80 TABLET, FILM COATED ORAL at 16:37

## 2019-01-23 RX ADMIN — CLOPIDOGREL 75 MG: 75 TABLET, FILM COATED ORAL at 08:29

## 2019-01-23 RX ADMIN — RANOLAZINE 500 MG: 500 TABLET, FILM COATED, EXTENDED RELEASE ORAL at 08:27

## 2019-01-23 RX ADMIN — INSULIN LISPRO 1 UNITS: 100 INJECTION, SOLUTION INTRAVENOUS; SUBCUTANEOUS at 11:54

## 2019-01-23 RX ADMIN — INSULIN GLARGINE 15 UNITS: 100 INJECTION, SOLUTION SUBCUTANEOUS at 21:32

## 2019-01-23 RX ADMIN — INSULIN LISPRO 4 UNITS: 100 INJECTION, SOLUTION INTRAVENOUS; SUBCUTANEOUS at 11:54

## 2019-01-23 RX ADMIN — BACITRACIN ZINC 1 SMALL APPLICATION: 500 OINTMENT TOPICAL at 08:27

## 2019-01-23 RX ADMIN — RANOLAZINE 500 MG: 500 TABLET, FILM COATED, EXTENDED RELEASE ORAL at 21:32

## 2019-01-23 RX ADMIN — METOPROLOL SUCCINATE 75 MG: 25 TABLET, EXTENDED RELEASE ORAL at 08:27

## 2019-01-23 RX ADMIN — NICOTINE 1 PATCH: 21 PATCH, EXTENDED RELEASE TRANSDERMAL at 09:15

## 2019-01-23 RX ADMIN — LISINOPRIL 10 MG: 10 TABLET ORAL at 08:27

## 2019-01-23 RX ADMIN — ASPIRIN 81 MG: 81 TABLET, COATED ORAL at 08:27

## 2019-01-23 RX ADMIN — MELATONIN TAB 3 MG 6 MG: 3 TAB at 21:32

## 2019-01-23 RX ADMIN — ACETAMINOPHEN 650 MG: 325 TABLET ORAL at 08:31

## 2019-01-23 RX ADMIN — ACETAMINOPHEN 650 MG: 325 TABLET ORAL at 19:45

## 2019-01-23 RX ADMIN — TAMSULOSIN HYDROCHLORIDE 0.4 MG: 0.4 CAPSULE ORAL at 16:37

## 2019-01-23 NOTE — PLAN OF CARE
Problem: Potential for Falls  Goal: Patient will remain free of falls  INTERVENTIONS:  - Assess patient frequently for physical needs  -  Identify cognitive and physical deficits and behaviors that affect risk of falls  -  [place] fall precautions as indicated by assessment   - Educate patient/family on patient safety including physical limitations  - Instruct patient to call for assistance with activity based on assessment  - Modify environment to reduce risk of injury  - Consider OT/PT consult to assist with strengthening/mobility   Outcome: Progressing  Patient arrived from [facility] , call bell within reach and instructed to call

## 2019-01-23 NOTE — NURSING NOTE
Patient voiding small amounts since admission, urine pink with bloody flecks, and has small amount bloody drainage from penis  Ambulated to  with assist of 1 and rolling walker at midnight, gait steady  Patient stated he was comfortable, and slept until 0400, when he became incontinent of urine while sleeping  Bladder scan 871, straight catheterization attempted with a #14 Coude cath, resistance met and was unable to obtain urine  RN also attempted same with another #14 Coude which was well lubricated, obtained minimal pink urine, rescan bladder 845  Ambulated to  again, voided 50 ml pink urine  Discomfort during procedure noted, resting quietly after

## 2019-01-23 NOTE — TELEPHONE ENCOUNTER
Patient passed void trial  Currently discharged from [facility] to [facility] unit  Will arrange follow up once patient discharged from [facility]

## 2019-01-23 NOTE — H&P
Lupe Mcclure is a 72 y o  male who presents here for rehab  The patient had prolonged hospitalization at [facility] from 1/9/2019 into 1/22/2019 for polymicrobial bacteremia/sepsis secondary to UTI and epididymitis  He is on day 14/14 of IV antibiotics and unfortunately is debilitated  PMH significant for urinatry retention, DM II, CAD,PAD, COPD, moderate aortic stenosis, h/o bacteremia  Pt  Admitted to [facility] for rehab       Vital: Semirecumbent /70, HR 68 bpm, SPo2 96%           Post ambulation /66, HR 71 bpm, SpO2 97%   RPE 5/10; pain in LE's 5-6/10

## 2019-01-23 NOTE — SOCIAL WORK
SW met with patient to review admission packet and signed consents  All consents signed, patient would like to use [facility]  Patient is unsure if he has AD, SW to follow-up with patient's daughter and to schedule a [facility]  SW reviewed psychosocial assessment  Prior to admission, patient lived with his brother in a 2  with 10 steps to second story where his bedroom and bathroom is located  Patient has 10 steps to his basement  Patient is independent with ADLs, reports his brother will "do chores" and patient will cook meals  Patient ambulates with a SPC in community and no DME inside  Patient manages own meds and drives  Patient confirmed PCP is Dr Aleksandar Ordoñez   Denies , reports he smokes 1 1/2- 2 PPD, denies SNF admission in past 60 days and reports pharmacy is [facility] [address]

## 2019-01-23 NOTE — PHYSICAL THERAPY NOTE
Physical Therapy Evaluation and Treatment Note    Time In/Out: 6378-7551    Patient's Name: Anderson Velez     Vital: Semirecumbent /70, HR 68 bpm, SPo2 96%           Post ambulation /66, HR 71 bpm, SpO2 95 97%   RPE 5/10; pain in LE's 5-6/10     Admitting Diagnosis  Cystitis [N30 90]    Problem List  Patient Active Problem List   Diagnosis    Acute cystitis without hematuria    Tobacco abuse    Sepsis due to Klebsiella (HCC)    Lactic acidosis    Elevated troponin I level    Shortness of breath    Bacteremia due to Klebsiella pneumoniae    Urinary retention    Urethral bleeding    Type 2 diabetes mellitus with hyperglycemia, with long-term current use of insulin ([facility])    CAD (coronary artery disease)    PAD (peripheral artery disease) ([facility])    COPD (chronic obstructive pulmonary disease) ([facility])    Moderate aortic stenosis    Hyperlipidemia    Hypertension    Diabetes mellitus ([facility]    History of bacteremia    Ambulatory dysfunction       Past Medical History  Past Medical History:   Diagnosis Date    CAD (coronary artery disease)     COPD (chronic obstructive pulmonary disease) ([facility]     Diabetes mellitus ([facility]     History of bacteremia     Hyperlipidemia     Hypertension     Moderate aortic stenosis     PAD (peripheral artery disease) ([facility])     UTI (urinary tract infection)        Past Surgical History  Past Surgical History:   Procedure Laterality Date    CORONARY ARTERY BYPASS GRAFT        01/23/19 1345   Note Type   Note type Eval/Treat   Pain Assessment   Pain Assessment No/denies pain   Home Living   Type of Home House  (4 ALONA w/ ascending left rail; 6 ALONA w/ rail left ascending)   Home Layout Two level  (Bed and bath on second floor)   Home Equipment Cane   Additional Comments Brother lives with patient, + , 2 dtrs taht are local and supportive   Prior Function   Level of [place] Independent with ADLs and functional mobility   Lives With Family  (see above) Receives Help From [facility] in the last 6 months 0   Vocational Retired   Comments Does not use AD in home, only outdoors   Restrictions/Precautions   [place] Bearing Precautions Per Order No   Other Precautions Fall Risk  (Picc line)   Cognition   Overall Cognitive Status WFL   Arousal/Participation Cooperative   Attention Within functional limits   Orientation Level Oriented X4   Following Commands Follows multistep commands without difficulty   RUE Assessment   RUE Assessment WFL   LUE Assessment   LUE Assessment WFL   RLE Assessment   RLE Assessment WFL   LLE Assessment   LLE Assessment WFL   Light Touch   RLE Light Touch Grossly intact   LLE Light Touch Grossly intact   Proprioception   RLE Proprioception Grossly intact   LLE Proprioception Grossly Intact   Bed Mobility   Rolling R 7  Independent   Rolling L 6  Modified independent   Additional items Bedrails   Supine to Sit 5  Supervision   Additional items Increased time required   Sit to Supine 6  Modified independent   Transfers   Sit to Stand 5  Supervision   Additional items Assist x 1;Verbal cues;Armrests   Stand to Sit 4  Minimal assistance   Additional items Assist x 1; Increased time required;Verbal cues;Armrests   Stand pivot 4  Minimal assistance   Additional items Assist x 1;Verbal cues   Ambulation/Elevation   Gait pattern Forward Flexion; Improper Weight shift; Inconsistent edinson; Redundant gait at times   Gait Assistance 4  Minimal assist   Additional items Verbal cues   Assistive Device Straight cane;Rolling walker   Stair Management Assistance 4  Minimal assist   Additional items Assist x 1;Verbal cues   Stair Management Technique Reciprocal;Two rails   Number of Stairs (2 steps)   Balance   Static Sitting Good   Static Standing Fair -   Ambulatory Fair -   Endurance Deficit   Endurance Deficit Yes   Endurance Deficit Description (pain in legs and fatigue)   Activity Tolerance   Activity Tolerance Patient limited by pain; Patient limited by fatigue   Assessment   Prognosis Good   Problem List Decreased strength;Decreased endurance; Impaired balance;Decreased mobility; Decreased safety awareness;Decreased skin integrity  (dryness both feet, R/L toes redness when dependent)   Assessment Francisca Huang is a 72 y o  male who presents here for rehab  The patient had prolonged hospitalization at [place] [id number] from 1/9/2019 into 1/22/2019 for polymicrobial bacteremia/sepsis secondary to UTI and epididymitis  He is on day 14/14 of IV antibiotics per chart  PMH significant for urinatry retention, DM II, CAD,PAD, COPD, moderate aortic stenosis, h/o bacteremia, Pt reports history of heart attack and stent placement in one leg (unable to recall which leg)  He reports being following for poor circulation in [facility] with possible intervention in future however unable to fully clarify  Pt  Admitted to [facility] for rehab  On evaluation patient is cooperative and oriented x 4  He reports no pain on evaluation at rest however report pain with ambulation in [facility], appears to be intermittent claudication during ambulation  He was also sensitive to touch to great toes and noted to have elongated toenails and will benefit from podiatry consult  Pt reports that he wears his slippers indoor and outdoors  He does not report having diabetic footwear  Therapist discussed variations in diabetic footwear as he would benefit from diabetic shoes due to his medical conditions  Pt states he will follow-up with PCP upon discharge  Prior to admission patient ambulated without assistive device  Today patient ambulated with [facility] for 86 feet with cane in RUE with poor sequencing of cane and uneven step lengths with decrease push off noted as well  Pt wearing hospital pants and therapist held pants to avoid slippage and risk for fall  Pt also ambulated with  for 143 feet with VC for posture and body position  Poor carryover with instruction on posture and position   Gait was stable with RW, with SPC patient appeared apprehensive during ambulation at time which may be due to pain in legs when walking  Pt  Was able to perform steps today, initially reluctant but once he realized it was only a  Few and not Full flight he was agreeable  Two rails were used for safety  Pt will benefit from skilled PT to assure safe discharge to home with family  In summary the patient's history, examination of body system(s), activity limitations, participation restrictions, and collaboration of care are the guiding factors that were used to determine the clinical descion  The clinical presentation is       unstable and therefore the assigned level of complexity is: high  Barriers to Discharge Inaccessible home environment   Goals   Patient Goals "to get better"   STG Expiration Date 02/06/19   Treatment Day 1   Plan   Treatment/Interventions Functional transfer training;LE strengthening/ROM; Elevations; Therapeutic exercise; Endurance training;Patient/family training;Equipment eval/education; Bed mobility;Gait training   Recommendation   Recommendation Home PT  (when cleared for discharge from rehab)   Equipment Recommended (TBD closer to discharge if any needs)   Barthel Index   Feeding 10   Bathing 0   Grooming Score 5   Dressing Score 5   Bladder Score 5   Bowels Score 5   Toilet Use Score 5   Transfers (Bed/Chair) Score 10   Mobility (Level Surface) Score 0   Stairs Score 5   Barthel Index Score 50   Frequency: 6x/week    STG=LTG    1  Pt will perform all bed mobility skills independently  2  Pt will perform all functional transfers modified independent with least restrictive assistive device  3  Pt will ambulate 300 feet with least restrictive assistive device modified independent to allow for safe mobility on unit and at home  4  Pt will ascend and descend FF of steps with one rail and AD to allow for safe entry into home and to second floor in home modified independent     5  Pt will demonstrate proper sequencing with SPC during ambulation to decrease fall risk and improve balance function for level surfaces  6  Pt will ambulate without assistive device household distances of 50 feet independently      ________________________________________________________    PT Treatment Note    Time In/Out: 14:06-14:35    S: " I want to get better "    O: Pt treatment consisted of transfer training with  for hand placement for safety and proper mechanics with AD (SPC/RW)  Gait training with [facility] for proper sequence and with RW for proper body position and posture  Pt ambulated 143 feet with RW  Pt was educated in step training/sequence  Education in [address] [facility] during rehab stay  Pt was instructed to perform bedside self initiated ankle Pokagon/df/pf to decrease risk for thrombus  A: Pt tolerated treatment; He reports RPE 5-6/10 with ambulation using SPC for distance of 86 feet and RW for distance of 143 feet  Use of BUE support allowed for increased tolerance for ambulation without any rest breaks  He had decreased ability to carryover cues for posture and position with RW and sequencing with SPC  Biofeedback with use of wall may assist with improved posture during ambulation with RW      P: Pt will be seen for 6x/week PT to address goals per SURESH Bowden PT

## 2019-01-23 NOTE — OCCUPATIONAL THERAPY NOTE
Occupational Therapy Evaluation + Treatment Note    OT Evaluation (15mins) 8:10-8:25  OT Treatment (15mins) 8:25-8:40    Jayde Net    1/23/2019    Patient Active Problem List   Diagnosis    Acute cystitis without hematuria    Tobacco abuse    Sepsis due to Klebsiella ([facility]    Lactic acidosis    Elevated troponin I level    Shortness of breath    Bacteremia due to Klebsiella pneumoniae    Urinary retention    Urethral bleeding    Type 2 diabetes mellitus with hyperglycemia, with long-term current use of insulin ([facility])    CAD (coronary artery disease)    PAD (peripheral artery disease) ([facility] [address] [facility]    COPD (chronic obstructive pulmonary disease) ([facility] [facility]    Moderate aortic stenosis    Hyperlipidemia    Hypertension    Diabetes mellitus ([facility]    History of bacteremia    Ambulatory dysfunction       Past Medical History:   Diagnosis Date    CAD (coronary artery disease)     COPD (chronic obstructive pulmonary disease) ([facility] [address] [facility]     Diabetes mellitus ([facility]     History of bacteremia     Hyperlipidemia     Hypertension     Moderate aortic stenosis     PAD (peripheral artery disease) ([facility])     UTI (urinary tract infection)        Past Surgical History:   Procedure Laterality Date    CORONARY ARTERY BYPASS GRAFT        01/23/19 0800   Note Type   Note type Eval/Treat   Restrictions/Precautions   Weight Bearing Precautions Per Order No   Other Precautions Fall Risk;Pain  (Picc (R arm))   Pain Assessment   Pain Score Worst Possible Pain   Pain Location Abdomen  (Urinary retention)   Pain Orientation Lower   Patient's Stated Pain Goal No pain   Home Living   Type of [address] [place] [address] Two level;Bed/bath upstairs;Stairs to enter with rails; Laundry in basement  (4STE front, 6STE back)   Bathroom Shower/Tub Tub/shower unit   Bathroom Toilet Standard   Home Equipment Cane   Prior Function   Level of [place] Independent with ADLs and functional mobility   Lives With (Brother)   ADL Assistance Independent   IADLs Independent   Falls in the last 6 months 0  ("not that i'm aware of")   Vocational Retired  (Was a  at [employer])   Lifestyle   Autonomy Pt lives with brother who is mentally disabled but cares for himself  Pt reports independence with ADLs/IADLs/ambulation  Intrinsic Gratification ("Playstation and television")   ADL   Where Assessed Edge of bed   Eating Assistance 7  Independent   Grooming Assistance 5  Supervision/Setup   Grooming Deficit Setup  (Seated)   UB Bathing Assistance 5  Supervision/Setup   UB Bathing Deficit Setup   LB Bathing Assistance 4  Minimal Assistance   LB Bathing Deficit Steadying   UB Dressing Assistance 5  Supervision/Setup   UB Dressing Deficit Setup   LB Dressing Assistance 4  Minimal Assistance   LB Dressing Deficit Steadying   Toileting Assistance  4  Minimal Assistance   Toileting Deficit Supervison/safety;Steadying   Bed Mobility   Supine to Sit 5  Supervision   Sit to Supine 5  Supervision   Transfers   Sit to Stand 5  Supervision   Stand pivot 4  Minimal assistance   Additional items Assist x 1   Toilet transfer 4  Minimal assistance   Additional items Assist x 1;Standard toilet   Functional Mobility   Functional Mobility 4  Minimal assistance   Additional Comments CGA to/from bathroom   Additional items Rolling walker   Balance   Dynamic Sitting Fair   Static Standing Fair -   Activity Tolerance   Activity Tolerance Patient limited by fatigue;Patient limited by pain   Nurse Made Aware Nsg cleared for therapy  Nsg aware of pain  RUE Assessment   RUE Assessment WFL   LUE Assessment   LUE Assessment WFL   Hand Function   Gross Motor Coordination Functional   Fine Motor Coordination Functional   Sensation   Light Touch No apparent deficits   Proprioception   Proprioception No apparent deficits   Vision-Basic Assessment   Current Vision No visual deficits   Cognition   Overall Cognitive Status WFL   Arousal/Participation Alert; Responsive; Cooperative Attention Within functional limits   Orientation Level Oriented X4   Following Commands Follows all commands and directions without difficulty   Assessment   Limitation Decreased ADL status; Decreased endurance;Decreased self-care trans;Decreased high-level ADLs   Prognosis Good   Assessment Pt is a 72 y o  male seen for OT evaluation at [facility], admitted 1/22/2019 w/ Ambulatory dysfunction  OT completed expanded review of pt's medical and social history  Comorbidities affecting pt's functional performance at time of assessment include: DM, urinary retension, sepsis, klebsiella, HTN, COPD  Personal factors affecting pt at time of IE include:steps to enter environment, limited home support, difficulty performing ADLS, difficulty performing IADLS  and health management   Prior to admission, pt was living with brother in [facility]  [facility] and was independent with ADLs and IADLs  Upon evaluation, pt presents to OT below baseline due to the following performance deficits: weakness, decreased strength, decreased balance, decreased tolerance and increased pain  Pt to benefit from continued skilled OT tx while in the hospital to address deficits as defined above and maximize level of functional independence w ADL's and functional mobility  Occupational Performance areas to address include: bathing/shower, toilet hygiene, dressing, medication management, functional mobility, community mobility, clothing management, cleaning, meal prep, household maintenance and social participation  Based on findings, pt is of moderate complexity  Anticipate home OT upon discharge to assess safety in the home and continue progress towards functional goals  Goals   Patient Goals "To be well and get out of here" "Go to the bathroom by myself"   Plan   Treatment Interventions ADL retraining;Functional transfer training;UE strengthening/ROM; Endurance training;Patient/family training; Compensatory technique education; Energy conservation; Activityengagement Goal Expiration Date 02/06/19   Treatment Day 1   OT Frequency (6x/wk)   Additional Treatment Session   Start Time 0825   End Time 0840   Treatment Assessment Patient participated in Skilled OT session this date with interventions consisting of ADL re training with the use of correct body mechnaics, Energy Conservation techniques and safety awareness and fall prevention techniques   Patient agreeable to OT treatment session, upon arrival patient was found supine in bed  Pt requesting to use toilet  /57  SPO2 95%  Pt requires CGA-Min A throughout toileting for balance and endurance during toilet hygiene and clothing management  Patient is functioning below baseline level, therefore occupational performance remains limited secondary to factors listed above and increased risk for falls and injury  From OT standpoint, recommendation at time of d/c would be Home OT  Patient to benefit from continued Occupational Therapy treatment while in the hospital to address deficits as defined above and maximize level of functional independence with ADLs and functional mobility  Pt left with call bell in reach, tray table in reach, needs met, and nursing assistant in room  Recommendation   OT Discharge Recommendation Home OT   OT - OK to Discharge No       Occupational Therapy Goals:  Pt will achieve the following goals within in 1 week  *Pt will complete grooming standing at sink with supervision    *Pt will complete UB bathing and dressing with Mod I    *Pt will complete LB bathing and dressing with supervision, with no assistive device  *Pt will complete toileting (hygiene and clothing management) with supervision    *Pt will complete bed mobility with independent, with bed flat and no side rail to prep for purposeful tasks    *Pt will perform functional transfers with supervision in order to complete ADL routine      *Pt will achieve fair + standing balance (dynamic & unsupported) for clothing management  *Pt will increase standing tolerance to 5 minutes in order to complete hygiene  *Pt will achieve good  seated balance (dynamic) for LB dressing  *Pt will complete tub/shower transfer with stand by assistance/supervision using most appropriate method, with any appropriate AE  *Pt will complete meal prep with stand by assistance/supervision while demonstrating good safety  *Pt will complete home management (laundry & bed making) with stand by assistance/supervision while demonstrating good safety  *Pt will complete item retrieval with stand by assistance/supervision , while demonstrating good safety  *Pt will demonstrate increased activity tolerance in order to complete ADL routine  *Pt will participate in [facility] assessment to determine level of safety for returning home    *Pt will participate in UE therapeutic exercise in order to maximize strength for ADL transfers  *Pt will identify 2-3 energy conservation techniques to increase performance with daily purposeful tasks  Pt will achieve the following goals by time of discharge  *Pt will complete grooming standing at sink with independence    *Pt will complete UB bathing and dressing with independence    *Pt will complete LB bathing and dressing with independence  *Pt will complete toileting (hygiene and clothing management) with independence    *Pt will perform functional transfers with independence in order to complete ADL routine  *Pt will achieve good standing balance (dynamic & unsupported) for clothing management  *Pt will increase standing tolerance to 10 minutes in order to complete hygiene  *Pt will complete tub/shower transfer with modified independence using most appropriate method, with any appropriate AE  *Pt will complete meal prep with modified independence/independence while demonstrating good safety      *Pt will complete home management (laundry & bed making) with modified independence/independence while demonstrating good safety  *Pt will complete item retrieval with modified independence/independence , while demonstrating good safety  *Pt will demonstrate increased activity tolerance in order to complete ADL routine  *Pt will participate in [address] [place] [address] assessment to determine level of safety for returning home    *Pt will participate in UE therapeutic exercise in order to maximize strength for ADL transfers  *Pt will identify 2-3 energy conservation techniques to increase performance with daily purposeful tasks      righTune, OT

## 2019-01-23 NOTE — PLAN OF CARE
Problem: PHYSICAL THERAPY ADULT  Goal: Performs mobility at highest level of function for planned discharge setting  See evaluation for individualized goals  Treatment/Interventions: Functional transfer training, LE strengthening/ROM, Elevations, Therapeutic exercise, Endurance training, Patient/family training, Equipment eval/education, Bed mobility, Gait training  Equipment Recommended:  (TBD closer to discharge if any needs)       See flowsheet documentation for full assessment, interventions and recommendations  Prognosis: Good  Problem List: Decreased strength, Decreased endurance, Impaired balance, Decreased mobility, Decreased safety awareness, Decreased skin integrity (dryness both feet, R/L toes redness when dependent)  Assessment: Araceli Bishop is a 72 y o  male who presents here for rehab  The patient had prolonged hospitalization at [facility] from 1/9/2019 into 1/22/2019 for polymicrobial bacteremia/sepsis secondary to UTI and epididymitis  He is on day 14/14 of IV antibiotics per chart  PMH significant for urinatry retention, DM II, CAD,PAD, COPD, moderate aortic stenosis, h/o bacteremia, Pt reports history of heart attack and stent placement in one leg (unable to recall which leg)  He reports being following for poor circulation in LE's with possible intervention in future however unable to fully clarify  Pt  Admitted to [facility] for rehab  On evaluation patient is cooperative and oriented x 4  He reports no pain on evaluation at rest however report pain with ambulation in LE's, appears to be intermittent claudication during ambulation  He was also sensitive to touch to great toes and noted to have elongated toenails and will benefit from podiatry consult  Pt reports that he wears his slippers indoor and outdoors  He does not report having diabetic footwear  Therapist discussed variations in diabetic footwear as he would benefit from diabetic shoes due to his medical conditions  Pt states he will follow-up with PCP upon discharge  Prior to admission patient ambulated without assistive device  Today patient ambulated with [facility] for 86 feet with cane in RUE with poor sequencing of cane and uneven step lengths with decrease push off noted as well  Pt wearing hospital pants and therapist held pants to avoid slippage and risk for fall  Pt also ambulated with RW for 143 feet with VC for posture and body position  Poor carryover with instruction on posture and position  Gait was stable with RW, with SPC patient appeared apprehensive during ambulation at time which may be due to pain in legs when walking  Pt  Was able to perform steps today, initially reluctant but once he realized it was only a  Few and not Full flight he was agreeable  Two rails were used for safety  Pt will benefit from skilled PT to assure safe discharge to home with family  In summary the patient's history, examination of body system(s), activity limitations, participation restrictions, and collaboration of care are the guiding factors that were used to determine the clinical descion  The clinical presentation is       unstable and therefore the assigned level of complexity is: high  Barriers to Discharge: Inaccessible home environment     Recommendation: Home PT (when cleared for discharge from rehab)          See flowsheet documentation for full assessment

## 2019-01-23 NOTE — PLAN OF CARE
Problem: OCCUPATIONAL THERAPY ADULT  Goal: Performs self-care activities at highest level of function for planned discharge setting  See evaluation for individualized goals  Outcome: Progressing  Limitation: Decreased ADL status, Decreased endurance, Decreased self-care trans, Decreased high-level ADLs  Prognosis: Good  Assessment: Pt is a 72 y o  male seen for OT evaluation at [facility], admitted 1/22/2019 w/ Ambulatory dysfunction  OT completed expanded review of pt's medical and social history  Comorbidities affecting pt's functional performance at time of assessment include: DM, urinary retension, sepsis, klebsiella, HTN, COPD  Personal factors affecting pt at time of IE include:steps to enter environment, limited home support, difficulty performing ADLS, difficulty performing IADLS  and health management   Prior to admission, pt was living with brother in [facility] and was independent with ADLs and IADLs  Upon evaluation, pt presents to OT below baseline due to the following performance deficits: weakness, decreased strength, decreased balance, decreased tolerance and increased pain  Pt to benefit from continued skilled OT tx while in the hospital to address deficits as defined above and maximize level of functional independence w ADL's and functional mobility  Occupational Performance areas to address include: bathing/shower, toilet hygiene, dressing, medication management, functional mobility, community mobility, clothing management, cleaning, meal prep, household maintenance and social participation  Based on findings, pt is of moderate complexity  Anticipate home OT upon discharge to assess safety in the home and continue progress towards functional goals       OT Discharge Recommendation: Home OT  OT - OK to Discharge: No

## 2019-01-23 NOTE — SOCIAL WORK
Daughter contacted SW back, [facility] scheduled for tomorrow 1/24 at 2:00 PM  Team notified

## 2019-01-24 LAB
GLUCOSE SERPL-MCNC: 146 MG/DL (ref 70–99)
GLUCOSE SERPL-MCNC: 162 MG/DL (ref 70–99)
GLUCOSE SERPL-MCNC: 175 MG/DL (ref 70–99)
GLUCOSE SERPL-MCNC: 179 MG/DL (ref 70–99)
GLUCOSE SERPL-MCNC: 242 MG/DL (ref 70–99)

## 2019-01-24 PROCEDURE — 97116 GAIT TRAINING THERAPY: CPT

## 2019-01-24 PROCEDURE — 97110 THERAPEUTIC EXERCISES: CPT

## 2019-01-24 PROCEDURE — 97530 THERAPEUTIC ACTIVITIES: CPT

## 2019-01-24 PROCEDURE — 82948 REAGENT STRIP/BLOOD GLUCOSE: CPT

## 2019-01-24 PROCEDURE — 97535 SELF CARE MNGMENT TRAINING: CPT

## 2019-01-24 RX ORDER — PANTOPRAZOLE SODIUM 40 MG/1
40 TABLET, DELAYED RELEASE ORAL
Status: DISCONTINUED | OUTPATIENT
Start: 2019-01-25 | End: 2019-01-29 | Stop reason: HOSPADM

## 2019-01-24 RX ORDER — MAGNESIUM HYDROXIDE/ALUMINUM HYDROXICE/SIMETHICONE 120; 1200; 1200 MG/30ML; MG/30ML; MG/30ML
30 SUSPENSION ORAL ONCE
Status: DISCONTINUED | OUTPATIENT
Start: 2019-01-24 | End: 2019-01-29 | Stop reason: HOSPADM

## 2019-01-24 RX ADMIN — INSULIN LISPRO 4 UNITS: 100 INJECTION, SOLUTION INTRAVENOUS; SUBCUTANEOUS at 17:49

## 2019-01-24 RX ADMIN — INSULIN LISPRO 1 UNITS: 100 INJECTION, SOLUTION INTRAVENOUS; SUBCUTANEOUS at 06:44

## 2019-01-24 RX ADMIN — INSULIN GLARGINE 15 UNITS: 100 INJECTION, SOLUTION SUBCUTANEOUS at 22:22

## 2019-01-24 RX ADMIN — LISINOPRIL 10 MG: 10 TABLET ORAL at 08:27

## 2019-01-24 RX ADMIN — ASPIRIN 81 MG: 81 TABLET, COATED ORAL at 08:27

## 2019-01-24 RX ADMIN — ATORVASTATIN CALCIUM 80 MG: 80 TABLET, FILM COATED ORAL at 16:14

## 2019-01-24 RX ADMIN — BACITRACIN ZINC 1 SMALL APPLICATION: 500 OINTMENT TOPICAL at 08:26

## 2019-01-24 RX ADMIN — METOPROLOL SUCCINATE 75 MG: 25 TABLET, EXTENDED RELEASE ORAL at 08:26

## 2019-01-24 RX ADMIN — NICOTINE 1 PATCH: 21 PATCH, EXTENDED RELEASE TRANSDERMAL at 08:27

## 2019-01-24 RX ADMIN — INSULIN LISPRO 2 UNITS: 100 INJECTION, SOLUTION INTRAVENOUS; SUBCUTANEOUS at 12:36

## 2019-01-24 RX ADMIN — BACITRACIN ZINC 1 SMALL APPLICATION: 500 OINTMENT TOPICAL at 17:49

## 2019-01-24 RX ADMIN — MELATONIN TAB 3 MG 6 MG: 3 TAB at 22:22

## 2019-01-24 RX ADMIN — CLOPIDOGREL 75 MG: 75 TABLET, FILM COATED ORAL at 08:26

## 2019-01-24 RX ADMIN — RANOLAZINE 500 MG: 500 TABLET, FILM COATED, EXTENDED RELEASE ORAL at 22:22

## 2019-01-24 RX ADMIN — RANOLAZINE 500 MG: 500 TABLET, FILM COATED, EXTENDED RELEASE ORAL at 08:26

## 2019-01-24 RX ADMIN — INSULIN LISPRO 1 UNITS: 100 INJECTION, SOLUTION INTRAVENOUS; SUBCUTANEOUS at 22:22

## 2019-01-24 RX ADMIN — TAMSULOSIN HYDROCHLORIDE 0.4 MG: 0.4 CAPSULE ORAL at 16:14

## 2019-01-24 RX ADMIN — INSULIN LISPRO 4 UNITS: 100 INJECTION, SOLUTION INTRAVENOUS; SUBCUTANEOUS at 06:44

## 2019-01-24 RX ADMIN — INSULIN LISPRO 4 UNITS: 100 INJECTION, SOLUTION INTRAVENOUS; SUBCUTANEOUS at 12:37

## 2019-01-24 NOTE — PLAN OF CARE
Problem: PHYSICAL THERAPY ADULT  Goal: Performs mobility at highest level of function for planned discharge setting  See evaluation for individualized goals  Treatment/Interventions: Functional transfer training, LE strengthening/ROM, Elevations, Therapeutic exercise, Endurance training, Patient/family training, Equipment eval/education, Bed mobility, Gait training  Equipment Recommended:  (TBD closer to discharge if any needs)       See flowsheet documentation for full assessment, interventions and recommendations  Outcome: Progressing  Prognosis: Good  Problem List: Decreased endurance, Pain  Assessment: Pt irritable thru session- encouragement and education provided   Pt very focused on what he use to do at home - educated in illness and poss need for adjustments   Pt states that he is agreeable but also then is  resistant   Pt limited in distance 2* pain in lower legs with increase distance - but appeared to have better tolerance with RW  Pt did well on steps and is steady with gt with RW , cane and no AD ( for short distances O2 sats 95 HR 67 at rest then after activity sats 98 HR 70  Barriers to Discharge: Inaccessible home environment     Recommendation: Home PT (when cleared for discharge from rehab)          See flowsheet documentation for full assessment

## 2019-01-24 NOTE — PHYSICAL THERAPY NOTE
32' session     01/24/19 1040   Pain Assessment   Pain Assessment 0-10   Pain Score (0 at rest then 6 with amb ( in lower legs))   Pain Type Other (Comment)  (pt reports bad circulation( claudication? ))   Pain Location Leg   Pain Orientation Lower;Right;Left   Pain Descriptors Tightness   Pain Frequency Intermittent   Pain Onset Ongoing   Patient's Stated Pain Goal No pain   Hospital Pain Intervention(s) Rest   Response to Interventions pain decreases with rest   Restrictions/Precautions   Other Precautions Pain; Fall Risk   General   Chart Reviewed Yes   Family/Caregiver Present No   Cognition   Overall Cognitive Status WFL   Arousal/Participation Alert; Cooperative   Attention Within functional limits   Following Commands Follows one step commands without difficulty   Subjective   Subjective reports ready for PT after OT then reports too fatigued- reports being hot in room and wanted heat turned down - then putting hands in robe stating ( well I have to keep warm some how" -reports that he want sto use cane but with use reports  he needs RW to walk   Bed Mobility   Supine to Sit 7  Independent   Sit to Supine 7  Independent   Additional Comments bed flat no rail   Transfers   Sit to Stand 6  Modified independent   Stand to Sit 6  Modified independent   Stand pivot 6  Modified independent   Additional items (with RW , cane and no AD)   Ambulation/Elevation   Gait pattern (step thru sequencing )   Gait Assistance 5  Supervision   Assistive Device Rolling walker;SPC;None   Distance (cane for 70', no AD for 40', RW [id number]')   Stair Management Assistance 5  Supervision   Stair Management Technique Step to pattern; One rail L;With cane   Number of Stairs 12   Balance   Ambulatory Fair   Endurance Deficit   Endurance Deficit Yes   Endurance Deficit Description limited by fatigue and leg pain   Activity Tolerance   Activity Tolerance Patient limited by fatigue;Patient limited by pain   Exercises   Knee AROM [employer] (pt performed 1 reps each leg andstates " I am done with that)   Assessment   Prognosis Good   Problem List Decreased endurance;Pain   Assessment Pt irritable thru session- encouragement and education provided   Pt very focused on what he use to do at home - educated in illness and poss need for adjustments   Pt states that he is agreeable but also then is  resistant   Pt limited in distance 2* pain in lower legs with increase distance - but appeared to have better tolerance with RW   Pt did well on steps and is steady with gt with RW , cane and no AD ( for short distances O2 sats 95 HR 67 at rest then after activity sats 98 HR 70   Barriers to Discharge Inaccessible home environment   Goals   Patient Goals get better and get out of here   STG Expiration Date 02/06/19   LTG Expiration Date 02/06/19   Treatment Day 2   Plan   Treatment/Interventions (cont as per pOC)   Progress Progressing toward goals   PT Frequency (6x/week)

## 2019-01-24 NOTE — PHYSICAL THERAPY NOTE
Pt care conference : held with pt, daughter Ricardolandy Bowels, nursing, [place] and this PTA present - discussed present LOF ( reported OT states S for ADLs with pt quickly fatiguing)  And pt steady with amb with cane , RW and no AD ( for short distances) Pt limited in endurance and distance 2* claudication type pain in B LEs wit increase distance  Better tolerated with use of RW but pt set on the fact that he used no AD in the home and cane outside  Educated pt and daughter in present  Vs past with poss new needs ( at least short term) Both report understanding and are agreeable to RW being issued  Also rec commode forhome to lessen need to do steps as pt fatigues easily on steps  Both defer commode but are agreeable  To urinal use if needed  Pt reports that he does see a vascular  Surgeon and has had angioplasties with no lasting improvement   Pt and daughter would like to shoot for Wed 1- for dc date and are agreeable to cont PT to work towards goals

## 2019-01-24 NOTE — NURSING NOTE
Alert and Oriented x 3 , vitals adequate , afebrile , at 0900 C/o pain lower abdomen , tylenol was given , encouraged to go to BR   Voided 350 cc pinkish urine and was scanned for 369 cc   At noon voided 200 and scanned for 245   At dinner voided 250 and scanned for 250 cc pinkish urine   Bacitracin to tip of penis applied slightly red , will monitor

## 2019-01-24 NOTE — OCCUPATIONAL THERAPY NOTE
Occupational Therapy Treatment Note    Name:  Lanny Fischer   MRN:   [id number]  Age:     72 y o  Patient Active Problem List   Diagnosis    Acute cystitis without hematuria    Tobacco abuse    Sepsis due to Klebsiella (HCC)    Lactic acidosis    Elevated troponin I level    Shortness of breath    Bacteremia due to Klebsiella pneumoniae    Urinary retention    Urethral bleeding    Type 2 diabetes mellitus with hyperglycemia, with long-term current use of insulin (HCC)    CAD (coronary artery disease)    PAD (peripheral artery disease) ([facility])    COPD (chronic obstructive pulmonary disease) ([facility])    Moderate aortic stenosis    Hyperlipidemia    Hypertension    Diabetes mellitus ([facility] [address] )    History of bacteremia    Ambulatory dysfunction     Cystitis [N30 90]      Subjective/Goals: " this place is cold and I get no sleep"    Vitals: Please see chart summary    OT total treatment time:53    Additional goals & Comments: Pt performed ADL w/ encouragement  Pt c/o cold, heat increased and robe provided  Pt c/o LE pain after standing for LB bathe, declined pain meds  Pt denies pain following seated rest bk  Pt performed BUE AROM w/ 3# dowel w/ rest bks, pt stating "I use to press 150#"  Pt stated that he "warmed up" following therex  Pt remained OOB in recliner w/ edu and encouragement to increase time OOB  All needs in reach, PT to follow  01/24/19 1008   Restrictions/Precautions   Weight Bearing Precautions Per Order No   Other Precautions Fall Risk   Lifestyle   Reciprocal Relationships Lives w/ brother   Pain Assessment   Pain Assessment (initally no pain, 3/10 w/ standing no pain end of tx)   Hospital Pain Intervention(s) Emotional support; Ambulation/increased activity; Medication (See MAR)   ADL   Where Assessed Chair   Grooming Assistance (Declined sinkside, I seated for hair care)   UB Bathing Comments (MI)   LB Bathing Assistance (S for huang/ buttocks, declined LE's and feet)   UB Dressing Assistance (MI to [employer], Min doff gown and robe)   LB Dressing Deficit (MI don pj pants, S pull up)   Toileting Comments (no need)   Functional Standing Tolerance   Time `3min   Activity ADL   Bed Mobility   Supine to Sit 6  Modified independent   Additional items Increased time required; Bedrails   Transfers   Sit to Stand 5  Supervision   Additional items Armrests   Stand to Sit 5  Supervision   Additional items Armrests   Stand pivot 5  Supervision   Additional items (w/ RW)   Additional Comments F dyn supported   Therapeutic Excerise-Strength   UE Strength (3# dowel 30 curls 10 x 2 Abd/Add, 3 x10 press/[place])   Cognition   Overall Cognitive Status WFL   Arousal/Participation Alert; Cooperative   Attention Within functional limits   Orientation Level Oriented X4   Following Commands Follows all commands and directions without difficulty   Comments (pt irritable at times and c/o cold, heat in room increased)   Activity Tolerance   Activity Tolerance Patient limited by pain   Assessment   Assessment Patient participated in Skilled OT session this date with interventions consisting of ADL re training with the use of correct body mechnaics, Energy Conservation techniques, safety awareness and fall prevention techniques, increase dynamic sit/ stand balance during functional activity  and increase OOB/ sitting tolerance   Patient agreeable to OT treatment session, upon arrival patient was found supine in bed  In comparison to previous session, patient with improvements in Act willie, seated standing limited by pain, declined meds   Patient requiring frequent rest periods  Patient continues to be functioning below baseline level, occupational performance remains limited secondary to factors listed above and increased risk for falls and injury  From OT standpoint, recommendation at time of d/c would be Home OT and family support     Patient to benefit from continued Occupational Therapy treatment while on TCF to address deficits as defined above and maximize level of functional independence with ADLs and functional mobility  Plan   Treatment Interventions ADL retraining;Functional transfer training;UE strengthening/ROM; Energy conservation; Activityengagement   Goal Expiration Date 02/06/19   Treatment Day 2   OT Frequency (6x/wk)   Recommendation   OT Discharge Recommendation Home OT   OT - OK to Discharge Melissa Soto

## 2019-01-24 NOTE — PLAN OF CARE
Problem: OCCUPATIONAL THERAPY ADULT  Goal: Performs self-care activities at highest level of function for planned discharge setting  See evaluation for individualized goals  Outcome: Progressing  Limitation: Decreased ADL status, Decreased endurance, Decreased self-care trans, Decreased high-level ADLs  Prognosis: Good  Assessment: Patient participated in Skilled OT session this date with interventions consisting of ADL re training with the use of correct body mechnaics, Energy Conservation techniques, safety awareness and fall prevention techniques, increase dynamic sit/ stand balance during functional activity  and increase OOB/ sitting tolerance   Patient agreeable to OT treatment session, upon arrival patient was found supine in bed  In comparison to previous session, patient with improvements in Act willie, seated standing limited by pain, declined meds   Patient requiring frequent rest periods  Patient continues to be functioning below baseline level, occupational performance remains limited secondary to factors listed above and increased risk for falls and injury  From OT standpoint, recommendation at time of d/c would be Home OT and family support  Patient to benefit from continued Occupational Therapy treatment while on TCF to address deficits as defined above and maximize level of functional independence with ADLs and functional mobility        OT Discharge Recommendation: Home OT  OT - OK to Discharge: No      Comments: Bobby Louis

## 2019-01-24 NOTE — SOCIAL WORK
[facility] held with patient and patient's daughter Bam Hanna  PTA, HUGO and DON also present and reviewed POC  PTA recommending a RW for long distances, possibly a SPC for indoors  Patient is agreeable, SW to order a RW  PTA also recommending a BSC or urinal for the 1st floor  Patient may not be agreeable but will think about possible a urinal  Patient would like to discharge on Wednesday 1/30  SW to follow-up with discharge planning  No further questions/concerns at this time  HUGO provided a packet of POA/living will information to daughter

## 2019-01-25 LAB
GLUCOSE SERPL-MCNC: 110 MG/DL (ref 70–99)
GLUCOSE SERPL-MCNC: 168 MG/DL (ref 70–99)
GLUCOSE SERPL-MCNC: 244 MG/DL (ref 70–99)
GLUCOSE SERPL-MCNC: 303 MG/DL (ref 70–99)

## 2019-01-25 PROCEDURE — 97535 SELF CARE MNGMENT TRAINING: CPT

## 2019-01-25 PROCEDURE — 82948 REAGENT STRIP/BLOOD GLUCOSE: CPT

## 2019-01-25 PROCEDURE — 97116 GAIT TRAINING THERAPY: CPT

## 2019-01-25 PROCEDURE — 97530 THERAPEUTIC ACTIVITIES: CPT

## 2019-01-25 RX ORDER — AMOXICILLIN 250 MG
1 CAPSULE ORAL
Status: DISCONTINUED | OUTPATIENT
Start: 2019-01-25 | End: 2019-01-29 | Stop reason: HOSPADM

## 2019-01-25 RX ORDER — TAMSULOSIN HYDROCHLORIDE 0.4 MG/1
0.4 CAPSULE ORAL 2 TIMES DAILY
Status: DISCONTINUED | OUTPATIENT
Start: 2019-01-25 | End: 2019-01-29 | Stop reason: HOSPADM

## 2019-01-25 RX ORDER — INSULIN GLARGINE 100 [IU]/ML
20 INJECTION, SOLUTION SUBCUTANEOUS
Status: DISCONTINUED | OUTPATIENT
Start: 2019-01-25 | End: 2019-01-29 | Stop reason: HOSPADM

## 2019-01-25 RX ADMIN — SENNOSIDES AND DOCUSATE SODIUM 1 TABLET: 8.6; 5 TABLET ORAL at 21:32

## 2019-01-25 RX ADMIN — PANTOPRAZOLE SODIUM 40 MG: 40 TABLET, DELAYED RELEASE ORAL at 06:50

## 2019-01-25 RX ADMIN — PANTOPRAZOLE SODIUM 40 MG: 40 TABLET, DELAYED RELEASE ORAL at 17:38

## 2019-01-25 RX ADMIN — ATORVASTATIN CALCIUM 80 MG: 80 TABLET, FILM COATED ORAL at 17:38

## 2019-01-25 RX ADMIN — NICOTINE 1 PATCH: 21 PATCH, EXTENDED RELEASE TRANSDERMAL at 09:09

## 2019-01-25 RX ADMIN — RANOLAZINE 500 MG: 500 TABLET, FILM COATED, EXTENDED RELEASE ORAL at 09:07

## 2019-01-25 RX ADMIN — INSULIN LISPRO 1 UNITS: 100 INJECTION, SOLUTION INTRAVENOUS; SUBCUTANEOUS at 07:52

## 2019-01-25 RX ADMIN — LISINOPRIL 10 MG: 10 TABLET ORAL at 09:09

## 2019-01-25 RX ADMIN — INSULIN LISPRO 3 UNITS: 100 INJECTION, SOLUTION INTRAVENOUS; SUBCUTANEOUS at 21:33

## 2019-01-25 RX ADMIN — METOPROLOL SUCCINATE 75 MG: 25 TABLET, EXTENDED RELEASE ORAL at 09:07

## 2019-01-25 RX ADMIN — INSULIN LISPRO 4 UNITS: 100 INJECTION, SOLUTION INTRAVENOUS; SUBCUTANEOUS at 17:39

## 2019-01-25 RX ADMIN — INSULIN LISPRO 3 UNITS: 100 INJECTION, SOLUTION INTRAVENOUS; SUBCUTANEOUS at 17:37

## 2019-01-25 RX ADMIN — MELATONIN TAB 3 MG 6 MG: 3 TAB at 21:32

## 2019-01-25 RX ADMIN — BACITRACIN ZINC 1 SMALL APPLICATION: 500 OINTMENT TOPICAL at 09:08

## 2019-01-25 RX ADMIN — BACITRACIN ZINC 1 SMALL APPLICATION: 500 OINTMENT TOPICAL at 17:38

## 2019-01-25 RX ADMIN — CLOPIDOGREL 75 MG: 75 TABLET, FILM COATED ORAL at 09:08

## 2019-01-25 RX ADMIN — ASPIRIN 81 MG: 81 TABLET, COATED ORAL at 09:08

## 2019-01-25 RX ADMIN — INSULIN LISPRO 4 UNITS: 100 INJECTION, SOLUTION INTRAVENOUS; SUBCUTANEOUS at 07:52

## 2019-01-25 RX ADMIN — INSULIN GLARGINE 20 UNITS: 100 INJECTION, SOLUTION SUBCUTANEOUS at 21:31

## 2019-01-25 RX ADMIN — RANOLAZINE 500 MG: 500 TABLET, FILM COATED, EXTENDED RELEASE ORAL at 21:32

## 2019-01-25 RX ADMIN — TAMSULOSIN HYDROCHLORIDE 0.4 MG: 0.4 CAPSULE ORAL at 17:38

## 2019-01-25 NOTE — NURSING NOTE
Patient is on day three of a voiding trial  Voided 400 and bladder scanned 291 cc   Patient also experiencing acid reflux  Prilosec was being taken at home but was not ordered here  Protonix is on order now per Anderson Benjamin the Presley RANGELta was ordered  OTD for relief

## 2019-01-25 NOTE — PHYSICAL THERAPY NOTE
Physical Therapy Daily Treatment Note         01/25/19: 39 minutes ( 13:47 to 14:26)   Pain Assessment   Pain Assessment 0-10   Pain Score 5   Pain Location (bilateral feet ( R worse than left))   Pain Descriptors Pins and needles  (States " Neuropathy ")   Pain Onset Ongoing   Patient's Stated Pain Goal No pain   Hospital Pain Intervention(s) Medication (See MAR); Ambulation/increased activity;Repositioned; Emotional support   Restrictions/Precautions   Weight Bearing Precautions Per Order No   Other Precautions Chair Alarm; Bed Alarm; Fall Risk;Pain   General   Chart Reviewed Yes   Response to Previous Treatment Patient with no complaints from previous session  Family/Caregiver Present No   Cognition   Overall Cognitive Status WFL   Arousal/Participation Alert; Cooperative   Attention Within functional limits   Orientation Level Disoriented X4   Memory Within functional limits   Following Commands Follows all commands and directions without difficulty   Bed Mobility   Rolling R 7  Independent   Rolling L 7  Independent   Supine to Sit 7  Independent   Sit to Supine 7  Independent   Additional Comments (Bed Mob: decreased time and effort, HOB flat, no rail)   Transfers   Sit to Stand 5  Supervision   Additional items Increased time required  (Good hand placement, + unsteadiness)   Stand to Sit 5  Supervision  (+ controlled descent with heavy left lateral lean)   Additional items Increased time required  (Good hand placement, + unsteadiness)   Stand pivot 5  Supervision  (SPT with no AD and with SPC)   Additional items Increased time required  (Good turn completion with SPC)   Additional items (Transfers: EOB, recliner, unsecured arm chair, low sofa)   Ambulation/Elevation   Gait pattern Improper Weight shift;Decreased foot clearance  (+ Medial/lateral sway, + unsteadiness, slow edinson )   Gait Assistance 5  Supervision  (+ SOB and feeling of exhaustion post amb trials)   Additional items (Amb 40 ' x 2 no AD S ( LOB x 1 to L; Dependent for recovery))   Assistive Device (72 ' x 2 with SPC ( LOB x 1 to L; MIN A for recovery))   Distance (+ seq cues with SPC, holds SPC too far forward)   Stair Management Assistance Not tested  (Pt declined due to extreme fatigue and BLE pain)   Balance   Static Sitting Good   Dynamic Sitting (Fair+)   Dynamic Standing (Fair with no AD and with SPC)   Ambulatory (Fair with no AD and with SPC)   Higher level balance (Sidestep/step back, turned in tight areas with/without SPC)   Endurance Deficit   Endurance Deficit Yes   Endurance Deficit Description (Decreased endurance for activity)   Activity Tolerance   Activity Tolerance Patient limited by fatigue;Patient limited by pain   Nurse Made Aware (RN made aware of fatigue and BLE pain)   Assessment   Prognosis Good   Problem List Decreased strength; Impaired balance;Decreased mobility;Pain   Assessment Pt reported feeling " exhausted" multiple times throughout PT tx session  Pt stated he got no sleep last night because "people kept coming into the room for one thing or another"  In addition, pt stated " the shower I had this morning completely wiped me out"  Pt with LOB x 2 when ambulating with no AD and SPC; requiring assist for recovery  During last ambulation trial with SPC, pt stated " my legs hurt so much I can't do anymore"  Pt declined further ambulation with RW, elevation training on full flight of steps, and BLE therex secondary to pain and fatigue  RN made aware  Barriers to Discharge Decreased caregiver support; Inaccessible home environment   Goals   Patient Goals ("Get better")   STG Expiration Date 02/06/19   Short Term Goal #1 STG's = LTG's   LTG Expiration Date 02/06/19   Long Term Goal #1 STG's = LTG's   Treatment Day 3   Plan   Treatment/Interventions ADL retraining;Functional transfer training;LE strengthening/ROM; Elevations; Therapeutic exercise; Endurance training;Cognitive reorientation;Patient/family training;Equipment eval/education; Bed mobility;Gait training; Compensatory technique education;Spoke to MD;Spoke to nursing;Spoke to case management;Spoke to advanced practitioner;OT;Family   Progress Progressing toward goals   PT Frequency (6x/week)   Recommendation   Equipment Recommended (Pt may need a RW at discharge)       Vitals  Semirecumbent: /51, HR 66, SPO2 (RA) 95%  Ambulated with no AD for 40 feet (seated): /56, HR 68, SPO2 (RA) 96%  Ambulated with SPC for 65 feet (seated): BP HR 70, SPO2 (RA) 96%

## 2019-01-25 NOTE — OCCUPATIONAL THERAPY NOTE
Occupational Therapy Treatment Note    Name:  Elidia Kelly   MRN:   [id number]  Age:     72 y o  Patient Active Problem List   Diagnosis    Acute cystitis without hematuria    Tobacco abuse    Sepsis due to Klebsiella (HCC)    Lactic acidosis    Elevated troponin I level    Shortness of breath    Bacteremia due to Klebsiella pneumoniae    Urinary retention    Urethral bleeding    Type 2 diabetes mellitus with hyperglycemia, with long-term current use of insulin (HCC)    CAD (coronary artery disease)    PAD (peripheral artery disease) (HCC)    [facility]) ([facility])    Moderate aortic stenosis    Hyperlipidemia    Hypertension    Diabetes mellitus ([facility] [address] )    History of bacteremia    Ambulatory dysfunction     Cystitis [N30 90]      Subjective/Goals: " I'm cold"    Vitals: Please see chart summary    OT total treatment time:54 min    Additional goals & Comments: Pt OOB when CHAKRABORTY entered c/o being cold  Pt provided w/ robe and with blankets  Pt ID 2 EC techniques, edu on additional techniques and provided w/ hand out  Pt c/o LE pain, declined meds at this time  Pt OOB w/ all needs in reach end of tx       01/25/19 0926   Restrictions/Precautions   Weight Bearing Precautions Per Order No   Other Precautions Fall Risk;Pain   Lifestyle   Reciprocal Relationships Lives w/ Brother   Pain Assessment   Pain Assessment 0-10   Pain Score 5   Pain Location Leg   Pain Orientation (Bilateral)   Pain Frequency (Mostly following standing)   Hospital Pain Intervention(s) Medication (See MAR); Repositioned; Emotional support   ADL   Grooming Deficit (Declined sinkside, ( I ) seated for cleansing hands w/wipe)   UB Dressing Assistance 5  Supervision/Setup   UB Dressing Deficit (to don robe, Min to [employer])   [address]  (S for hygiene and CM)   Light Housekeeping   Light Housekeeping Level (S/ MI for item retrieval w/ walker basket following Edu)   Functional Standing Tolerance   Time `4 5min   Activity Item retrieval   Transfers   Sit to Stand 5  Supervision   Additional items Armrests; Increased time required;Verbal cues   Stand to Sit 5  Supervision   Additional items Armrests; Increased time required;Verbal cues   Stand pivot 5  Supervision   Additional items Verbal cues   Additional Comments (F/ F+ Dyn and F Unsupp)   Functional Mobility   Functional Mobility 5  Supervision   Additional items Rolling walker   Toilet Transfers   Toilet Transfer Type To and from   Toilet Transfers Comments SPT w/ RW   Tub Transfers   Tub Transfer Type To and From   Tub Transfers Comments (w/ Grab bar step over, Edu consider installing at home)   Therapeutic Exercise - ROM   UE-ROM (Restorator 1 5 min x 2)   Cognition   Overall Cognitive Status [facility]   Arousal/Participation Alert; Cooperative   Memory Within functional limits   Following Commands Follows all commands and directions without difficulty   Activity Tolerance   Activity Tolerance Patient limited by fatigue   Assessment   Assessment Patient participated in Skilled OT session this date with interventions consisting of ADL re training with the use of correct body mechnaics, Energy Conservation techniques, Work simplification skills , safety awareness and fall prevention techniques, therapeutic exercise to: increase functional use of BUEs, increase BUE muscle strength ,  therapeutic activities to: increase activity tolerance and increase dynamic sit/ stand balance during functional activity    Patient agreeable to OT treatment session, upon arrival patient was found seated OOB to Recliner  In comparison to previous session, patient with improvements in Dyn Stand bal   Patient continues to be functioning below baseline level, occupational performance remains limited secondary to factors listed above and increased risk for falls and injury  From OT standpoint, recommendation at time of d/c would be Home OT     Patient to benefit from continued Occupational Therapy treatment while on TCF to address deficits as defined above and maximize level of functional independence with ADLs and functional mobility  Plan   Treatment Interventions ADL retraining;Functional transfer training;UE strengthening/ROM; Energy conservation; Activityengagement   Goal Expiration Date 02/06/19   Treatment Day 3   OT Frequency (6x/wk)   Recommendation   OT Discharge Recommendation Home OT   OT - OK to Discharge Melissa Busby

## 2019-01-25 NOTE — PLAN OF CARE
Problem: OCCUPATIONAL THERAPY ADULT  Goal: Performs self-care activities at highest level of function for planned discharge setting  See evaluation for individualized goals  Outcome: Progressing  Limitation: Decreased ADL status, Decreased endurance, Decreased self-care trans, Decreased high-level ADLs  Prognosis: Good  Assessment: Patient participated in Skilled OT session this date with interventions consisting of ADL re training with the use of correct body mechnaics, Energy Conservation techniques, Work simplification skills , safety awareness and fall prevention techniques, therapeutic exercise to: increase functional use of BUEs, increase BUE muscle strength ,  therapeutic activities to: increase activity tolerance and increase dynamic sit/ stand balance during functional activity    Patient agreeable to OT treatment session, upon arrival patient was found seated OOB to Recliner  In comparison to previous session, patient with improvements in Dyn Stand bal   Patient continues to be functioning below baseline level, occupational performance remains limited secondary to factors listed above and increased risk for falls and injury  From OT standpoint, recommendation at time of d/c would be Home OT  Patient to benefit from continued Occupational Therapy treatment while on TCF to address deficits as defined above and maximize level of functional independence with ADLs and functional mobility        OT Discharge Recommendation: Home OT  OT - OK to Discharge: No      Comments: Liam Canada

## 2019-01-25 NOTE — PLAN OF CARE
Problem: PHYSICAL THERAPY ADULT  Goal: Performs mobility at highest level of function for planned discharge setting  See evaluation for individualized goals  Treatment/Interventions: Functional transfer training, LE strengthening/ROM, Elevations, Therapeutic exercise, Endurance training, Patient/family training, Equipment eval/education, Bed mobility, Gait training  Equipment Recommended:  (TBD closer to discharge if any needs)       See flowsheet documentation for full assessment, interventions and recommendations  Prognosis: Good  Problem List: Decreased strength, Impaired balance, Decreased mobility, Pain  Assessment: Pt reported feeling " exhausted" multiple times throughout PT tx session  Pt stated he got no sleep last night because "people kept coming into the room for one thing or another"  In addition, pt stated " the shower I had this morning completely wiped me out"  Pt with LOB x 2 when ambulating with no AD and SPC; requiring assist for recovery  During last ambulation trial with SPC, pt stated " my legs hurt so much I can't do anymore"  Pt declined further ambulation with RW, elevation training on full flight of steps, and BLE therex secondary to pain and fatigue  RN made aware  Barriers to Discharge: Decreased caregiver support, Inaccessible home environment     Recommendation: Home PT (when cleared for discharge from rehab)          See flowsheet documentation for full assessment

## 2019-01-25 NOTE — PROGRESS NOTES
Medication Regimen Review (MRR)    To promote positive health outcomes and reduce adverse consequences the patient's medication therapy has been reviewed by a pharmacist for the following potential problems:   1   documented indication and therapeutic benefits  2   appropriate dose, frequency, route, and duration of therapy  3   medication interactions, side effects, and allergies  4   medication or transcription errors  Medications are also reviewed for appropriate monitoring, duplicate therapy, and dose reduction  Based on the review please see the following recommendations  Patient information:    The patient is 72 y o  admitted for [facility]    Wt Readings from Last 1 Encounters:   01/24/19 68 5 kg (151 lb 0 2 oz)       Lab Results   Component Value Date    GLUCOSE 212 (H) 01/09/2019    CALCIUM 8 2 (L) 01/22/2019    K 3 9 01/22/2019    CO2 25 01/22/2019     01/22/2019    BUN 10 01/22/2019    CREATININE 0 69 01/22/2019       Patient is taking the following medications that need review:   Pt's home med on HCTZ  was onhold during [facility] visit  Recommendations:  1  Oscat+ d supplement    2    BM regiment PRN

## 2019-01-26 LAB
GLUCOSE SERPL-MCNC: 168 MG/DL (ref 70–99)
GLUCOSE SERPL-MCNC: 172 MG/DL (ref 70–99)
GLUCOSE SERPL-MCNC: 194 MG/DL (ref 70–99)
GLUCOSE SERPL-MCNC: 201 MG/DL (ref 70–99)

## 2019-01-26 PROCEDURE — 97535 SELF CARE MNGMENT TRAINING: CPT

## 2019-01-26 PROCEDURE — G0515 COGNITIVE SKILLS DEVELOPMENT: HCPCS

## 2019-01-26 PROCEDURE — 82948 REAGENT STRIP/BLOOD GLUCOSE: CPT

## 2019-01-26 PROCEDURE — 97116 GAIT TRAINING THERAPY: CPT

## 2019-01-26 RX ADMIN — RANOLAZINE 500 MG: 500 TABLET, FILM COATED, EXTENDED RELEASE ORAL at 20:58

## 2019-01-26 RX ADMIN — RANOLAZINE 500 MG: 500 TABLET, FILM COATED, EXTENDED RELEASE ORAL at 09:43

## 2019-01-26 RX ADMIN — INSULIN GLARGINE 20 UNITS: 100 INJECTION, SOLUTION SUBCUTANEOUS at 23:41

## 2019-01-26 RX ADMIN — INSULIN LISPRO 1 UNITS: 100 INJECTION, SOLUTION INTRAVENOUS; SUBCUTANEOUS at 23:42

## 2019-01-26 RX ADMIN — INSULIN LISPRO 2 UNITS: 100 INJECTION, SOLUTION INTRAVENOUS; SUBCUTANEOUS at 08:22

## 2019-01-26 RX ADMIN — SENNOSIDES AND DOCUSATE SODIUM 1 TABLET: 8.6; 5 TABLET ORAL at 21:00

## 2019-01-26 RX ADMIN — LISINOPRIL 10 MG: 10 TABLET ORAL at 09:44

## 2019-01-26 RX ADMIN — INSULIN LISPRO 2 UNITS: 100 INJECTION, SOLUTION INTRAVENOUS; SUBCUTANEOUS at 17:38

## 2019-01-26 RX ADMIN — TAMSULOSIN HYDROCHLORIDE 0.4 MG: 0.4 CAPSULE ORAL at 09:43

## 2019-01-26 RX ADMIN — ASPIRIN 81 MG: 81 TABLET, COATED ORAL at 09:43

## 2019-01-26 RX ADMIN — BACITRACIN ZINC 1 SMALL APPLICATION: 500 OINTMENT TOPICAL at 17:34

## 2019-01-26 RX ADMIN — INSULIN LISPRO 4 UNITS: 100 INJECTION, SOLUTION INTRAVENOUS; SUBCUTANEOUS at 17:39

## 2019-01-26 RX ADMIN — INSULIN LISPRO 1 UNITS: 100 INJECTION, SOLUTION INTRAVENOUS; SUBCUTANEOUS at 12:45

## 2019-01-26 RX ADMIN — INSULIN LISPRO 4 UNITS: 100 INJECTION, SOLUTION INTRAVENOUS; SUBCUTANEOUS at 08:22

## 2019-01-26 RX ADMIN — TAMSULOSIN HYDROCHLORIDE 0.4 MG: 0.4 CAPSULE ORAL at 17:34

## 2019-01-26 RX ADMIN — MELATONIN TAB 3 MG 6 MG: 3 TAB at 21:00

## 2019-01-26 RX ADMIN — BISACODYL 10 MG: 5 TABLET, COATED ORAL at 20:58

## 2019-01-26 RX ADMIN — NICOTINE 1 PATCH: 21 PATCH, EXTENDED RELEASE TRANSDERMAL at 09:44

## 2019-01-26 RX ADMIN — PANTOPRAZOLE SODIUM 40 MG: 40 TABLET, DELAYED RELEASE ORAL at 06:22

## 2019-01-26 RX ADMIN — BACITRACIN ZINC 1 SMALL APPLICATION: 500 OINTMENT TOPICAL at 09:43

## 2019-01-26 RX ADMIN — CLOPIDOGREL 75 MG: 75 TABLET, FILM COATED ORAL at 09:44

## 2019-01-26 RX ADMIN — METOPROLOL SUCCINATE 75 MG: 25 TABLET, EXTENDED RELEASE ORAL at 09:43

## 2019-01-26 RX ADMIN — PANTOPRAZOLE SODIUM 40 MG: 40 TABLET, DELAYED RELEASE ORAL at 17:33

## 2019-01-26 RX ADMIN — ATORVASTATIN CALCIUM 80 MG: 80 TABLET, FILM COATED ORAL at 17:33

## 2019-01-26 RX ADMIN — INSULIN LISPRO 4 UNITS: 100 INJECTION, SOLUTION INTRAVENOUS; SUBCUTANEOUS at 12:46

## 2019-01-26 NOTE — OCCUPATIONAL THERAPY NOTE
Occupational Therapy Treatment Note    Name:  Janey Greene   MRN:   [id number]  Age:     72 y o  Patient Active Problem List   Diagnosis    Acute cystitis without hematuria    Tobacco abuse    Sepsis due to Klebsiella (HCC)    Lactic acidosis    Elevated troponin I level    Shortness of breath    Bacteremia due to Klebsiella pneumoniae    Urinary retention    Urethral bleeding    Type 2 diabetes mellitus with hyperglycemia, with long-term current use of insulin (HCC)    CAD (coronary artery disease)    PAD (peripheral artery disease) (HCC)    [facility])    Moderate aortic stenosis    Hyperlipidemia    Hypertension    Diabetes mellitus ([facility] [address] )    History of bacteremia    Ambulatory dysfunction     Cystitis [N30 90]      Subjective/Goals:n "I'm just so cold"    Vitals:Please see chart review    OT total treatment time:29 min    Additional goals & Comments: Pt in bed when CHAKRABORTY entered  Pt edu MOCA to be administered  Pt agreeable  MOCA score 22/30  Pt johny cold,pt provided w/robe and blanket  Pt declined to sit OOB  Edu increase time OOB to increase Act willie  Pt returned to bed bed placed in seated position, all needs in reach  01/26/19 1001   Restrictions/Precautions   Weight Bearing Precautions Per Order No   Other Precautions Bed Alarm   Lifestyle   Reciprocal Relationships live w/Brother   Pain Assessment   Pain Assessment 0-10   Pain Score No Pain   ADL   UB Dressing Assistance (S to don robe in standing)   Bed Mobility   Supine to Sit 7  Independent   Sit to Supine 7  Independent   Transfers   Sit to Stand 5  Supervision   Additional Comments Declined OOB to zak   Cognition   Overall Cognitive Status [facility]   Arousal/Participation Alert; Cooperative   Attention Within functional limits   Orientation Level Oriented X4   Memory Within functional limits   Cognition Assessment Tools MOCA   Assessment   Assessment Patient participated in Skilled OT session this date with interventions consisting of cognitive assessment  Patient agreeable to OT treatment session, upon arrival patient was found semi recumbent in bed  Pt required encouragement   Patient continues to be functioning below baseline level, occupational performance remains limited secondary to factors listed above and increased risk for falls and injury  From OT standpoint, recommendation at time of d/c would be Home OT  Patient to benefit from continued Occupational Therapy treatment while in the hospital to address deficits as defined above and maximize level of functional independence with ADLs and functional mobility      Plan   Treatment Interventions Activityengagement  [facility])   Goal Expiration Date 02/06/19   Treatment Day 4   OT Frequency (6x/wk)   Recommendation   OT Discharge Recommendation Home OT   OT - OK to Discharge Melissa Ku

## 2019-01-26 NOTE — PHYSICAL THERAPY NOTE
24 minute treatment       01/26/19 8503   Pain Assessment   Pain Assessment 0-10   Pain Score 3   Pain Location Foot   Pain Orientation Right   Pain Descriptors Tingling   Pain Onset Sudden  (with activity)   Patient's Stated Pain Goal No pain   Hospital Pain Intervention(s) Medication (See MAR); Ambulation/increased activity; Environmental changes   Diversional Activities Television   Ambulation/Elevation/Transfers-sup to sit Independent; sit to sup Independent  Sit to stand Supervision; stand to sit w/ Supervision; SPT w/ Supervision and RW support   Gait pattern Improper Weight shift;Decreased foot clearance   Gait Assistance 5  Supervision   Assistive Device Rolling walker   Distance 25' and 135'   Stair Management Assistance 5  Supervision   Stair Management Technique One rail R;With cane; Step to pattern  (cues for sequence)   Number of Stairs 4   Balance   Static Sitting Good   Ambulatory Fair   Endurance Deficit   Endurance Deficit Yes   Activity Tolerance   Activity Tolerance Patient limited by fatigue;Patient limited by pain   Assessment   Prognosis Good   Problem List Decreased strength; Impaired balance;Decreased mobility;Pain   Assessment Pt seen at bedside for PT treatment  Pt agreeable to PT, but reports "I'm not getting into that shower"  Assured pt that this PTA will not be taking him to the shower  Pt reported no foot pain at rest   Pt reported some increased pain in R foot w/ 1st amb  Pt reported no pain during 2nd amb w/ RW support  Pt declined any LE exercise  Pt back into bed after PT  Call bell, phone, TV remote in place     Goals   Patient Goals to get better and go home   STG Expiration Date 02/06/19   Treatment Day 4   Plan   Treatment/Interventions (Continue perplan of care)   PT Frequency (6x/week)   Justen Walker, PTA

## 2019-01-26 NOTE — PLAN OF CARE
Problem: PHYSICAL THERAPY ADULT  Goal: Performs mobility at highest level of function for planned discharge setting  See evaluation for individualized goals  Treatment/Interventions: Functional transfer training, LE strengthening/ROM, Elevations, Therapeutic exercise, Endurance training, Patient/family training, Equipment eval/education, Bed mobility, Gait training  Equipment Recommended:  (TBD closer to discharge if any needs)       See flowsheet documentation for full assessment, interventions and recommendations  Outcome: Progressing  Prognosis: Good  Problem List: Decreased strength, Impaired balance, Decreased mobility, Pain  Assessment: Pt seen at bedside for PT treatment  Pt agreeable to PT, but reports "I'm not getting into that shower"  Assured pt that this PTA will not be taking him to the shower  Pt reported no foot pain at rest   Pt reported some increased pain in R foot w/ 1st amb  Pt reported no pain during 2nd amb w/ RW support  Pt declined any LE exercise  Pt back into bed after PT  Call bell, phone, TV remote in place  Barriers to Discharge: Decreased caregiver support, Inaccessible home environment     Recommendation: Home PT (when cleared for discharge from rehab)          See flowsheet documentation for full assessment

## 2019-01-27 LAB
GLUCOSE SERPL-MCNC: 181 MG/DL (ref 70–99)
GLUCOSE SERPL-MCNC: 204 MG/DL (ref 70–99)
GLUCOSE SERPL-MCNC: 223 MG/DL (ref 70–99)
GLUCOSE SERPL-MCNC: 234 MG/DL (ref 70–99)

## 2019-01-27 PROCEDURE — 82948 REAGENT STRIP/BLOOD GLUCOSE: CPT

## 2019-01-27 RX ADMIN — TAMSULOSIN HYDROCHLORIDE 0.4 MG: 0.4 CAPSULE ORAL at 17:37

## 2019-01-27 RX ADMIN — ASPIRIN 81 MG: 81 TABLET, COATED ORAL at 09:52

## 2019-01-27 RX ADMIN — PANTOPRAZOLE SODIUM 40 MG: 40 TABLET, DELAYED RELEASE ORAL at 17:36

## 2019-01-27 RX ADMIN — ACETAMINOPHEN 650 MG: 325 TABLET ORAL at 00:52

## 2019-01-27 RX ADMIN — INSULIN LISPRO 4 UNITS: 100 INJECTION, SOLUTION INTRAVENOUS; SUBCUTANEOUS at 06:52

## 2019-01-27 RX ADMIN — INSULIN LISPRO 2 UNITS: 100 INJECTION, SOLUTION INTRAVENOUS; SUBCUTANEOUS at 17:28

## 2019-01-27 RX ADMIN — NICOTINE 1 PATCH: 21 PATCH, EXTENDED RELEASE TRANSDERMAL at 09:53

## 2019-01-27 RX ADMIN — INSULIN LISPRO 3 UNITS: 100 INJECTION, SOLUTION INTRAVENOUS; SUBCUTANEOUS at 06:51

## 2019-01-27 RX ADMIN — ATORVASTATIN CALCIUM 80 MG: 80 TABLET, FILM COATED ORAL at 17:36

## 2019-01-27 RX ADMIN — RANOLAZINE 500 MG: 500 TABLET, FILM COATED, EXTENDED RELEASE ORAL at 20:31

## 2019-01-27 RX ADMIN — CLOPIDOGREL 75 MG: 75 TABLET, FILM COATED ORAL at 09:51

## 2019-01-27 RX ADMIN — INSULIN LISPRO 1 UNITS: 100 INJECTION, SOLUTION INTRAVENOUS; SUBCUTANEOUS at 13:09

## 2019-01-27 RX ADMIN — INSULIN LISPRO 4 UNITS: 100 INJECTION, SOLUTION INTRAVENOUS; SUBCUTANEOUS at 13:08

## 2019-01-27 RX ADMIN — INSULIN GLARGINE 20 UNITS: 100 INJECTION, SOLUTION SUBCUTANEOUS at 21:08

## 2019-01-27 RX ADMIN — SENNOSIDES AND DOCUSATE SODIUM 1 TABLET: 8.6; 5 TABLET ORAL at 21:09

## 2019-01-27 RX ADMIN — TAMSULOSIN HYDROCHLORIDE 0.4 MG: 0.4 CAPSULE ORAL at 09:51

## 2019-01-27 RX ADMIN — ACETAMINOPHEN 650 MG: 325 TABLET ORAL at 20:31

## 2019-01-27 RX ADMIN — PANTOPRAZOLE SODIUM 40 MG: 40 TABLET, DELAYED RELEASE ORAL at 06:17

## 2019-01-27 RX ADMIN — INSULIN LISPRO 4 UNITS: 100 INJECTION, SOLUTION INTRAVENOUS; SUBCUTANEOUS at 17:27

## 2019-01-27 RX ADMIN — INSULIN LISPRO 2 UNITS: 100 INJECTION, SOLUTION INTRAVENOUS; SUBCUTANEOUS at 21:08

## 2019-01-27 RX ADMIN — MELATONIN TAB 3 MG 6 MG: 3 TAB at 21:09

## 2019-01-27 RX ADMIN — METOPROLOL SUCCINATE 75 MG: 25 TABLET, EXTENDED RELEASE ORAL at 09:51

## 2019-01-27 RX ADMIN — LISINOPRIL 10 MG: 10 TABLET ORAL at 09:51

## 2019-01-27 RX ADMIN — BACITRACIN ZINC 1 SMALL APPLICATION: 500 OINTMENT TOPICAL at 09:51

## 2019-01-27 RX ADMIN — RANOLAZINE 500 MG: 500 TABLET, FILM COATED, EXTENDED RELEASE ORAL at 09:50

## 2019-01-27 NOTE — NURSING NOTE
Diabetic care continues  On occasion urinary dribbling continues  No cc/o chest pain or respiratory distress  No c/o pain   Needs assist with walker and assist to go to br  Continue plan of care

## 2019-01-28 LAB
GLUCOSE SERPL-MCNC: 121 MG/DL (ref 70–99)
GLUCOSE SERPL-MCNC: 146 MG/DL (ref 70–99)
GLUCOSE SERPL-MCNC: 169 MG/DL (ref 70–99)
GLUCOSE SERPL-MCNC: 275 MG/DL (ref 70–99)

## 2019-01-28 PROCEDURE — 97110 THERAPEUTIC EXERCISES: CPT

## 2019-01-28 PROCEDURE — 97530 THERAPEUTIC ACTIVITIES: CPT

## 2019-01-28 PROCEDURE — 97535 SELF CARE MNGMENT TRAINING: CPT

## 2019-01-28 PROCEDURE — 97116 GAIT TRAINING THERAPY: CPT

## 2019-01-28 PROCEDURE — 82948 REAGENT STRIP/BLOOD GLUCOSE: CPT

## 2019-01-28 RX ADMIN — TAMSULOSIN HYDROCHLORIDE 0.4 MG: 0.4 CAPSULE ORAL at 18:04

## 2019-01-28 RX ADMIN — PANTOPRAZOLE SODIUM 40 MG: 40 TABLET, DELAYED RELEASE ORAL at 07:49

## 2019-01-28 RX ADMIN — MELATONIN TAB 3 MG 6 MG: 3 TAB at 22:07

## 2019-01-28 RX ADMIN — RANOLAZINE 500 MG: 500 TABLET, FILM COATED, EXTENDED RELEASE ORAL at 08:46

## 2019-01-28 RX ADMIN — TAMSULOSIN HYDROCHLORIDE 0.4 MG: 0.4 CAPSULE ORAL at 08:48

## 2019-01-28 RX ADMIN — ATORVASTATIN CALCIUM 80 MG: 80 TABLET, FILM COATED ORAL at 18:04

## 2019-01-28 RX ADMIN — CLOPIDOGREL 75 MG: 75 TABLET, FILM COATED ORAL at 08:46

## 2019-01-28 RX ADMIN — ASPIRIN 81 MG: 81 TABLET, COATED ORAL at 08:45

## 2019-01-28 RX ADMIN — INSULIN LISPRO 4 UNITS: 100 INJECTION, SOLUTION INTRAVENOUS; SUBCUTANEOUS at 12:59

## 2019-01-28 RX ADMIN — INSULIN LISPRO 1 UNITS: 100 INJECTION, SOLUTION INTRAVENOUS; SUBCUTANEOUS at 18:05

## 2019-01-28 RX ADMIN — ACETAMINOPHEN 650 MG: 325 TABLET ORAL at 10:53

## 2019-01-28 RX ADMIN — RANOLAZINE 500 MG: 500 TABLET, FILM COATED, EXTENDED RELEASE ORAL at 22:07

## 2019-01-28 RX ADMIN — ACETAMINOPHEN 650 MG: 325 TABLET ORAL at 22:11

## 2019-01-28 RX ADMIN — BACITRACIN ZINC 1 SMALL APPLICATION: 500 OINTMENT TOPICAL at 18:04

## 2019-01-28 RX ADMIN — NICOTINE 1 PATCH: 21 PATCH, EXTENDED RELEASE TRANSDERMAL at 08:45

## 2019-01-28 RX ADMIN — INSULIN GLARGINE 20 UNITS: 100 INJECTION, SOLUTION SUBCUTANEOUS at 22:09

## 2019-01-28 RX ADMIN — METOPROLOL SUCCINATE 75 MG: 25 TABLET, EXTENDED RELEASE ORAL at 08:47

## 2019-01-28 RX ADMIN — PANTOPRAZOLE SODIUM 40 MG: 40 TABLET, DELAYED RELEASE ORAL at 18:04

## 2019-01-28 RX ADMIN — SENNOSIDES AND DOCUSATE SODIUM 1 TABLET: 8.6; 5 TABLET ORAL at 22:07

## 2019-01-28 RX ADMIN — INSULIN LISPRO 4 UNITS: 100 INJECTION, SOLUTION INTRAVENOUS; SUBCUTANEOUS at 07:43

## 2019-01-28 RX ADMIN — INSULIN LISPRO 4 UNITS: 100 INJECTION, SOLUTION INTRAVENOUS; SUBCUTANEOUS at 18:05

## 2019-01-28 RX ADMIN — INSULIN LISPRO 4 UNITS: 100 INJECTION, SOLUTION INTRAVENOUS; SUBCUTANEOUS at 13:00

## 2019-01-28 RX ADMIN — BACITRACIN ZINC 1 SMALL APPLICATION: 500 OINTMENT TOPICAL at 08:47

## 2019-01-28 RX ADMIN — LISINOPRIL 10 MG: 10 TABLET ORAL at 08:48

## 2019-01-28 RX ADMIN — METOPROLOL SUCCINATE 50 MG: 25 TABLET, EXTENDED RELEASE ORAL at 08:46

## 2019-01-28 NOTE — PLAN OF CARE
Problem: PHYSICAL THERAPY ADULT  Goal: Performs mobility at highest level of function for planned discharge setting  See evaluation for individualized goals  Treatment/Interventions: Functional transfer training, LE strengthening/ROM, Elevations, Therapeutic exercise, Endurance training, Patient/family training, Equipment eval/education, Bed mobility, Gait training  Equipment Recommended:  (TBD closer to discharge if any needs)       See flowsheet documentation for full assessment, interventions and recommendations  Outcome: Progressing  Prognosis: Good  Problem List: Pain, Decreased endurance  Assessment: First amb trial with no AD ( just OOB) , pt with antalgic gt - reaching for things, but later was able to amb 46' with no UE support and mod I   Pt limited in distance with no AD and with cane, increase willie with use of RW  Pt cont to have incorrect sequencing with the cane but has no unsteadiness  Pt reports  Pain at 3 after amb 46' with cane then after 140' with RW  Pt did well on the steps today-with min pain  Barriers to Discharge: Decreased caregiver support, Inaccessible home environment     Recommendation: Home PT (when cleared for discharge from rehab)          See flowsheet documentation for full assessment

## 2019-01-28 NOTE — PLAN OF CARE
Problem: OCCUPATIONAL THERAPY ADULT  Goal: Performs self-care activities at highest level of function for planned discharge setting  See evaluation for individualized goals  Outcome: Progressing  Limitation: Decreased ADL status, Decreased endurance, Decreased self-care trans, Decreased high-level ADLs  Prognosis: Good  Assessment: Patient participated in Skilled OT session this date with interventions consisting of ADL re training with the use of correct body mechnaics, Energy Conservation techniques, Work simplification skills , safety awareness and fall prevention techniques, therapeutic exercise to: increase functional use of BUEs, increase BUE muscle strength ,  therapeutic activities to: increase activity tolerance and increase dynamic sit/ stand balance during functional activity    Patient agreeable to OT treatment session, upon arrival patient was found supine in bed  In comparison to previous session, patient with improvements in Act willie  Patient continues to be functioning below baseline level, occupational performance remains limited secondary to factors listed above and increased risk for falls and injury  From OT standpoint, recommendation at time of d/c would be Home OT  Patient to benefit from continued Occupational Therapy treatment while on TCF to address deficits as defined above and maximize level of functional independence with ADLs and functional mobility  Pt hoping to D/c tomorrow        OT Discharge Recommendation: Home OT  OT - OK to Discharge: No      Comments: Flavia Mouse

## 2019-01-28 NOTE — OCCUPATIONAL THERAPY NOTE
Occupational Therapy Treatment Note    Name:  Lanny Fischer   MRN:   [id number]  Age:     72 y o  Patient Active Problem List   Diagnosis    Acute cystitis without hematuria    Tobacco abuse    Sepsis due to Klebsiella (HCC)    Lactic acidosis    Elevated troponin I level    Shortness of breath    Bacteremia due to Klebsiella pneumoniae    Urinary retention    Urethral bleeding    Type 2 diabetes mellitus with hyperglycemia, with long-term current use of insulin (HCC)    CAD (coronary artery disease)    PAD (peripheral artery disease) (HCC)    [facility]    Moderate aortic stenosis    Hyperlipidemia    Hypertension    [facility] mellitus ([facility] [address] )    History of bacteremia    Ambulatory dysfunction     Cystitis [N30 90]      Subjective/Goals: " My Brother and Daughter can help me"    Vitals: Please see chart summary    OT total treatment time:61 min    Additional goals & Comments: Pt agreeable to tx  this am c/o cold and wanting to go home tomorrow  Pt  therex then performed laundry activity w/ S and F safety  Pt edu on safe technique  Pt stated that family could assist w/ laundry, CHAKRABORTY highly recommended 2nd LE pain and pt's c/o fatigue w/ standing activiites  Simple meal prep S w/ G safety transport w/ walker basket  Pt S w/ Tub shower pt S w/ grab bar  Pt edu sponge bathe until Home OT assesses BR, grab bar recommended  Pt edu on EC techniques pt verbalized one, although appears to realize limitations  Pt returned to room performed toileting, then declined sitting OOB  Pt agreeable to sit EOB for ADL w/ CNA  All needs in reach        01/28/19 1040   Restrictions/Precautions   Weight Bearing Precautions Per Order No   Other Precautions Bed Alarm   Lifestyle   Reciprocal Relationships Lives w/ brother   Pain Assessment   Pain Assessment 0-10   Pain Score (7/10 following standing tasks, NRS notified)   Pain Location Leg   Pain Orientation Bilateral   Hospital Pain Intervention(s) Medication (See MAR); Repositioned; Emotional support   ADL   LB Dressing Assistance (MI don pj pants, MI/S pull up w/ LE's propped against bed)   LB Dressing Comments (Declined shoes)   Toileting Assistance  (S w/CM following pants fallilng to floor level )   Toileting Comments (Per pt ( I ) w/ BM hgyiene demo reaching back)   Functional Standing Tolerance   Time `4min   Activity Laundry simulation   Bed Mobility   Supine to Sit 7  Independent   Sit to Supine 7  Independent   Transfers   Sit to Stand 6  Modified independent   Additional items Armrests; Increased time required   Stand to Sit 6  Modified independent   Additional items Armrests; Increased time required   Stand pivot 5  Supervision   Additional items (w/ RW)   Additional Comments (F+ Dyn, F/F+ Unilateral, F Unsupp)   Functional Mobility   Functional Mobility 5  Supervision   Additional items Rolling walker   Toilet Transfers   Toilet Transfer Type To and from   Toilet Transfers Comments SPT w/ RW   Tub Transfers   Tub Transfer Type To and From   Tub Transfer to (S w/ grab bar, recommend sponge bathe til install)   Therapeutic Excerise-Strength   UE Strength (# 3 dowel 30 curls, 10x 3, 2 additonal planes)   Cognition   Overall Cognitive Status WFL   Arousal/Participation Alert   Attention Within functional limits   Orientation Level Oriented X4   Memory Within functional limits   Following Commands Follows all commands and directions without difficulty   Comments (pt irritable at times)   Activity Tolerance   Activity Tolerance Patient limited by pain   Assessment   Assessment Patient participated in Skilled OT session this date with interventions consisting of ADL re training with the use of correct body mechnaics, Energy Conservation techniques, Work simplification skills , safety awareness and fall prevention techniques, therapeutic exercise to: increase functional use of BUEs, increase BUE muscle strength ,  therapeutic activities to: increase activity tolerance and increase dynamic sit/ stand balance during functional activity    Patient agreeable to OT treatment session, upon arrival patient was found supine in bed  In comparison to previous session, patient with improvements in Act willie  Patient continues to be functioning below baseline level, occupational performance remains limited secondary to factors listed above and increased risk for falls and injury  From OT standpoint, recommendation at time of d/c would be Home OT  Patient to benefit from continued Occupational Therapy treatment while on TCF to address deficits as defined above and maximize level of functional independence with ADLs and functional mobility  Pt hoping to D/c tomorrow  Plan   Treatment Interventions ADL retraining;Functional transfer training;UE strengthening/ROM; Energy conservation; Activityengagement   Goal Expiration Date 02/06/19   Treatment Day 5   OT Frequency (6x/wk)   Recommendation   OT Discharge Recommendation Home OT   OT - OK to Discharge Melissa Kennedy

## 2019-01-28 NOTE — SOCIAL WORK
SW informed patient would like to discharge home tomorrow vs Wednesday  SW sent referral to [facility], patient was accepted  SW sent referral for walker basket and [address] [address] liaison to deliver  SW discussed [address] with patient, agreeable to sign  No further questions/concerns

## 2019-01-28 NOTE — PHYSICAL THERAPY NOTE
44' session     01/28/19 5089   Pain Assessment   Pain Assessment 0-10   Pain Score (0 at rest then 3 with amb)   Pain Type Other (Comment)   Pain Location Leg   Pain Orientation Bilateral   Pain Radiating Towards (knee down B sides)   Pain Descriptors (cramping)   Pain Frequency Intermittent   Pain Onset Ongoing   Clinical Progression Not changed   Patient's Stated Pain Goal No pain   Hospital Pain Intervention(s) Medication (See MAR); Rest   Response to Interventions pain decreases with rest   General   Chart Reviewed Yes   Amount of Missed Time (min) 21 Minutes   Missed Time Reason (pt self limiting)   Family/Caregiver Present No   Cognition   Overall Cognitive Status WFL   Arousal/Participation Alert   Attention Within functional limits   Following Commands Follows one step commands without difficulty   Comments very irritable    Subjective   Subjective going home tomorrow   Bed Mobility   Supine to Sit 7  Independent   Sit to Supine 7  Independent   Additional Comments bed flat no rail   Transfers   Sit to Stand 6  Modified independent   Stand to Sit 6  Modified independent   Stand pivot 6  Modified independent   Toilet transfer 6  Modified independent   Ambulation/Elevation   Gait pattern (initial amb antalgic- better with increase distance)   Gait Assistance 6  Modified independent   Assistive Device Rolling walker;SPC;None   Distance (140' with RW, 46' with cane , 46' and 25' wiht no AD)   Stair Management Assistance 6  Modified independent   Stair Management Technique (one rail, cane- step to  sequeincing)   Number of Stairs 12   Balance   Static Standing Fair +   Ambulatory Fair   Endurance Deficit   Endurance Deficit Yes   Endurance Deficit Description (with pain in lower legs)   Activity Tolerance   Activity Tolerance Patient limited by pain   Exercises   Knee AROM Long Arc Quad (3# x 20 each)   Assessment   Prognosis Good   Problem List Pain;Decreased endurance   Assessment First amb trial with no AD ( just OOB) , pt with antalgic gt - reaching for things, but later was able to amb 46' with no UE support and mod I   Pt limited in distance with no AD and with cane, increase willie with use of RW  Pt cont to have incorrect sequencing with the cane but has no unsteadiness  Pt reports  Pain at 3 after amb 46' with cane then after 140' with RW  Pt did well on the steps today-with min pain   Barriers to Discharge Decreased caregiver support; Inaccessible home environment   Goals   Patient Goals go home by tomorrow   STG Expiration Date 02/06/19   LTG Expiration Date 02/06/19   Treatment Day 5   Plan   Treatment/Interventions (cont as per POC)   Progress Progressing toward goals   PT Frequency (6x/wk)

## 2019-01-29 VITALS
RESPIRATION RATE: 18 BRPM | BODY MASS INDEX: 22.89 KG/M2 | SYSTOLIC BLOOD PRESSURE: 131 MMHG | DIASTOLIC BLOOD PRESSURE: 61 MMHG | TEMPERATURE: 97.7 F | HEIGHT: 68 IN | HEART RATE: 70 BPM | WEIGHT: 151.01 LBS | OXYGEN SATURATION: 98 %

## 2019-01-29 PROBLEM — G47.00 INSOMNIA: Status: ACTIVE | Noted: 2019-01-29

## 2019-01-29 LAB
GLUCOSE SERPL-MCNC: 163 MG/DL (ref 70–99)
GLUCOSE SERPL-MCNC: 247 MG/DL (ref 70–99)

## 2019-01-29 PROCEDURE — 99316 NF DSCHRG MGMT 30 MIN+: CPT | Performed by: FAMILY MEDICINE

## 2019-01-29 PROCEDURE — 97535 SELF CARE MNGMENT TRAINING: CPT

## 2019-01-29 PROCEDURE — 97530 THERAPEUTIC ACTIVITIES: CPT

## 2019-01-29 PROCEDURE — 97110 THERAPEUTIC EXERCISES: CPT

## 2019-01-29 PROCEDURE — 82948 REAGENT STRIP/BLOOD GLUCOSE: CPT

## 2019-01-29 PROCEDURE — 97116 GAIT TRAINING THERAPY: CPT

## 2019-01-29 RX ORDER — CLOPIDOGREL BISULFATE 75 MG/1
75 TABLET ORAL DAILY
Qty: 30 TABLET | Refills: 0 | Status: SHIPPED | OUTPATIENT
Start: 2019-01-30

## 2019-01-29 RX ORDER — RANOLAZINE 500 MG/1
500 TABLET, EXTENDED RELEASE ORAL EVERY 12 HOURS SCHEDULED
Qty: 60 TABLET | Refills: 0 | Status: SHIPPED | OUTPATIENT
Start: 2019-01-29

## 2019-01-29 RX ORDER — OMEPRAZOLE 20 MG/1
20 CAPSULE, DELAYED RELEASE ORAL 2 TIMES DAILY
Qty: 60 CAPSULE | Refills: 0 | Status: SHIPPED | OUTPATIENT
Start: 2019-01-29

## 2019-01-29 RX ORDER — ASPIRIN 81 MG/1
81 TABLET ORAL DAILY
Qty: 30 TABLET | Refills: 0 | Status: SHIPPED | OUTPATIENT
Start: 2019-01-30

## 2019-01-29 RX ORDER — ATORVASTATIN CALCIUM 80 MG/1
80 TABLET, FILM COATED ORAL
Qty: 30 TABLET | Refills: 0 | Status: SHIPPED | OUTPATIENT
Start: 2019-01-29

## 2019-01-29 RX ORDER — TAMSULOSIN HYDROCHLORIDE 0.4 MG/1
0.4 CAPSULE ORAL 2 TIMES DAILY
Qty: 30 CAPSULE | Refills: 0 | Status: SHIPPED | OUTPATIENT
Start: 2019-01-29 | End: 2019-02-14 | Stop reason: SDUPTHER

## 2019-01-29 RX ORDER — INSULIN GLARGINE 100 [IU]/ML
20 INJECTION, SOLUTION SUBCUTANEOUS DAILY
Qty: 1 UNITS | Refills: 0
Start: 2019-01-29

## 2019-01-29 RX ORDER — METOPROLOL SUCCINATE 25 MG/1
75 TABLET, EXTENDED RELEASE ORAL DAILY
Qty: 30 TABLET | Refills: 0 | Status: SHIPPED | OUTPATIENT
Start: 2019-01-30 | End: 2019-09-23 | Stop reason: CLARIF

## 2019-01-29 RX ORDER — LANOLIN ALCOHOL/MO/W.PET/CERES
6 CREAM (GRAM) TOPICAL AS NEEDED
Qty: 15 TABLET | Refills: 0 | Status: SHIPPED | OUTPATIENT
Start: 2019-01-29

## 2019-01-29 RX ORDER — LISINOPRIL 10 MG/1
10 TABLET ORAL DAILY
Qty: 30 TABLET | Refills: 0 | Status: SHIPPED | OUTPATIENT
Start: 2019-01-30

## 2019-01-29 RX ADMIN — ACETAMINOPHEN 650 MG: 325 TABLET ORAL at 09:36

## 2019-01-29 RX ADMIN — INSULIN LISPRO 4 UNITS: 100 INJECTION, SOLUTION INTRAVENOUS; SUBCUTANEOUS at 07:46

## 2019-01-29 RX ADMIN — TAMSULOSIN HYDROCHLORIDE 0.4 MG: 0.4 CAPSULE ORAL at 09:37

## 2019-01-29 RX ADMIN — INSULIN LISPRO 4 UNITS: 100 INJECTION, SOLUTION INTRAVENOUS; SUBCUTANEOUS at 12:44

## 2019-01-29 RX ADMIN — CLOPIDOGREL 75 MG: 75 TABLET, FILM COATED ORAL at 09:38

## 2019-01-29 RX ADMIN — NICOTINE 1 PATCH: 21 PATCH, EXTENDED RELEASE TRANSDERMAL at 09:39

## 2019-01-29 RX ADMIN — ASPIRIN 81 MG: 81 TABLET, COATED ORAL at 09:38

## 2019-01-29 RX ADMIN — INSULIN LISPRO 1 UNITS: 100 INJECTION, SOLUTION INTRAVENOUS; SUBCUTANEOUS at 07:47

## 2019-01-29 RX ADMIN — INSULIN LISPRO 3 UNITS: 100 INJECTION, SOLUTION INTRAVENOUS; SUBCUTANEOUS at 12:43

## 2019-01-29 RX ADMIN — METOPROLOL SUCCINATE 75 MG: 25 TABLET, EXTENDED RELEASE ORAL at 09:37

## 2019-01-29 RX ADMIN — LISINOPRIL 10 MG: 10 TABLET ORAL at 09:37

## 2019-01-29 RX ADMIN — BACITRACIN ZINC 1 SMALL APPLICATION: 500 OINTMENT TOPICAL at 09:36

## 2019-01-29 RX ADMIN — PANTOPRAZOLE SODIUM 40 MG: 40 TABLET, DELAYED RELEASE ORAL at 06:45

## 2019-01-29 RX ADMIN — RANOLAZINE 500 MG: 500 TABLET, FILM COATED, EXTENDED RELEASE ORAL at 09:37

## 2019-01-29 NOTE — ASSESSMENT & PLAN NOTE
Patient had urinary retention and catheter was removed on admission by urology    Continue tamsulosin - no voiding issues

## 2019-01-29 NOTE — ASSESSMENT & PLAN NOTE
Recently admitted at [facility] from 1/9/2019 to 1/22/2019 with polymicrobial sepsis/bacteremia with e  Coli, streptococcus and actinomyces secondary to UTI/epididymitis  Per ID recommendation is 14 days of IV antibiotics currently on ampicillin last days today  Patient has completed the treatment

## 2019-01-29 NOTE — SOCIAL WORK
SW provided transfer letter to patient  Original placed in scan bin  Daughter came to unit to pick-up patient  Patient does not want walker basket at this time  Returned to [facility]  No further questions/concerns at this time for discharge

## 2019-01-29 NOTE — PHYSICAL THERAPY NOTE
Physical Therapy Note  Tx  Session 65 min        01/29/19 0969   Pain Assessment   Pain Type Chronic pain   Pain Location Leg   Pain Orientation Bilateral   Restrictions/Precautions   Weight Bearing Precautions Per Order No   General   Chart Reviewed Yes   Family/Caregiver Present No   Cognition   Overall Cognitive Status WFL   Subjective   Subjective Agreeable to PT  In bed supine upon entry  Reported I have not slept well for days  I cant wait ti go home  Pain reported only in B/L LE  Bed Mobility   Supine to Sit 7  Independent   Sit to Supine 7  Independent   Additional Comments bed flat and no bed rail used   Transfers   Sit to Stand 6  Modified independent   Additional items Armrests   Stand to Sit 6  Modified independent   Additional items Armrests   Stand pivot 6  Modified independent   Additional items (with RW)   Ambulation/Elevation   Gait pattern Decreased foot clearance   Gait Assistance 6  Modified independent   Assistive Device Rolling walker;SPC   Distance 110ft x 2, 80ft with RW and 40ft, 100ft with SPC   Stair Management Assistance 6  Modified independent   Additional items Assist x 1; Increased time required   Stair Management Technique One rail R;Step to pattern; Foreward   Number of Stairs 13   Endurance Deficit   Endurance Deficit Yes   Endurance Deficit Description pain   Activity Tolerance   Activity Tolerance Patient limited by pain; Patient tolerated treatment well   Nurse Made Aware Yes   Exercises   THR Supine;Bilateral;AROM  (x 30 - SLR, HS, hip abductions, quad and glut sets, AP, brid)   Assessment   Prognosis Good   Problem List Decreased strength;Decreased endurance;Decreased mobility;Pain   Assessment Pt  noted with good mobility this session  Pt  continues to be limited with pain in B/L LEs with ambualtion and needed rests between all ambulation due to increased pain  Reported increased pain in RLE compared to L  Pt  able to perform all LE TE's in supine position actively   Pt  reported dizziness with initail standing however subsiuded with rest seated at EOB  Noted no over LOB t/o session  Pt  reported increased pain in calf area post session  RN was informed of the same  Overall patient did well t/o session and was cooperative   Goals   Patient Goals Need to take a shower   STG Expiration Date 02/06/19   Treatment Day 6   Plan   Treatment/Interventions Functional transfer training;LE strengthening/ROM; Elevations; Therapeutic exercise;Patient/family training;Equipment eval/education; Bed mobility;Gait training;OT   Progress Progressing toward goals   PT Frequency Other (Comment)  (6x/week)         Yesika Berg, PTA

## 2019-01-29 NOTE — ASSESSMENT & PLAN NOTE
Ambulatory dysfunction secondary to bacteremia and sepsis  At baseline lives alone in her own house    PT/OT to evaluate and treat- has completed- clear to be discharged today

## 2019-01-29 NOTE — PLAN OF CARE
Problem: OCCUPATIONAL THERAPY ADULT  Goal: Performs self-care activities at highest level of function for planned discharge setting  See evaluation for individualized goals  Treatment Interventions: ADL retraining, Functional transfer training, UE strengthening/ROM, Energy conservation, Activityengagement          See flowsheet documentation for full assessment, interventions and recommendations  Outcome: Adequate for Discharge  Limitation: Decreased ADL status, Decreased endurance, Decreased self-care trans, Decreased high-level ADLs  Prognosis: Good  OT Discharge Recommendation: Home OT  OT - OK to Discharge: Yes if home with home OT/home services/medically ready  DISCHARGE SUMMARY        DISPOSITION: Home (lives with brother) with home health services        AE/DME: Pt reports that daughter purchased a shoe horn for him to use  RW with basket present in room for d/c         DISCHARGE SUMMARY: Pt d/c home with family 1/29/19  Pt participated in a total of 6/6 skilled OT treatment sessions  Pt achieved 4/17 goals  Deficits remain in activity tolerance, item retrieval, recalling education, safety, grooming, standing tolerance, meal preparation, seated/standing dynamic balance  Goals not met d/t decreased safety, insight, fatigue, low motivation, and premature d/c  Pt requested d/c  Pt would have benefited from further stay on TCF to maximize function needed for safety and independence with ADLs, functional mobility, IADLs  However, pt will have VNA services to ensure safety in own environment and maximize function  Recommend home OT to focus on above deficits and further achieve independence with occupational performance  Pt will have support from family  Pt adequate for d/c home with family support and services  No further concerns noted      RECOMMENDATIONS: Home with home OT and home health services and support from family

## 2019-01-29 NOTE — PLAN OF CARE
Problem: PHYSICAL THERAPY ADULT  Goal: Performs mobility at highest level of function for planned discharge setting  See evaluation for individualized goals  Treatment/Interventions: Functional transfer training, LE strengthening/ROM, Elevations, Therapeutic exercise, Endurance training, Patient/family training, Equipment eval/education, Bed mobility, Gait training  Equipment Recommended:  (TBD closer to discharge if any needs)       See flowsheet documentation for full assessment, interventions and recommendations  Outcome: Progressing  Prognosis: Good  Problem List: Decreased strength, Decreased endurance, Decreased mobility, Pain  Assessment: Pt  noted with good mobility this session  Pt  continues to be limited with pain in B/L LEs with ambualtion and needed rests between all ambulation due to increased pain  Reported increased pain in RLE compared to L  Pt  able to perform all LE TE's in supine position actively  Pt  reported dizziness with initail standing however subsiuded with rest seated at EOB  Noted no over LOB t/o session  Pt  reported increased pain in calf area post session  RN was informed of the same  Overall patient did well t/o session and was cooperative  Barriers to Discharge: Decreased caregiver support, Inaccessible home environment     Recommendation: Home PT (when cleared for discharge from rehab)          See flowsheet documentation for full assessment

## 2019-01-29 NOTE — ASSESSMENT & PLAN NOTE
Coronary artery disease status post CABG; no chest pain    Continue aspirin clopidogrel  and ranexa statin Ace

## 2019-01-29 NOTE — ASSESSMENT & PLAN NOTE
Lab Results   Component Value Date    HGBA1C 7 5 (H) 01/14/2019       Recent Labs      01/28/19   1145  01/28/19   1701  01/28/19 2050 01/29/19   0616   POCGLU  275*  169*  146*  163*     Diabetes mellitus  At home on glargine 40 units daily  Continue current dose at 20 units daily and lispro 4 unit t i d  - I will discharge him home on this regimen because he is sugars have been stable on it

## 2019-01-29 NOTE — OCCUPATIONAL THERAPY NOTE
Occupational Therapy Treatment and Discharge Note    TREATMENT NOTE (Time: 3394-9931)    Pt participated in skilled OT session 1/29/2019 with interventions consisting of self-care, energy conservation techniques, and therapeutic activity to: increase independence with ADLs, ADL transfers, safety, and activity tolerance needed to complete ADLs/IADLs  Pt agreeable to OT session  Upon arrival, patient was found sitting EOB  Vitals: 98% O2, 65 BPM, 129/59  Pt reported no ratable pain; however, reported that B LE and groin were sore  Pt mod I donning doffing shoes  Pt independent LE/UE dressing with increased time and frequent rest breaks  Pt supervision for LE bathing and independent for UE bathing  OT educated pt on energy conservation techniques and equipment use  Pt verbalized and demonstrated understanding of same  Pt demonstrated good safety throughout session  Pt appears close to baseline and safe to d/c home  Pt supine in bed, all needs met at end of session  DISCHARGE SUMMARY      DISPOSITION: Home (lives with brother) with home health services      AE/DME: Pt reports that daughter purchased a shoe horn for him to use  RW with basket present in room for d/c       DISCHARGE SUMMARY: Pt d/c home with family 1/29/19  Pt participated in a total of 6/6 skilled OT treatment sessions  Pt achieved 4/17 goals  Deficits remain in activity tolerance, item retrieval, recalling education, safety, grooming, standing tolerance, meal preparation, seated/standing dynamic balance  Goals not met d/t decreased safety, insight, fatigue, low motivation, and premature d/c  Pt requested d/c  Pt would have benefited from further stay on TCF to maximize function needed for safety and independence with ADLs, functional mobility, IADLs  However, pt will have VNA services to ensure safety in own environment and maximize function   Recommend home OT to focus on above deficits and further achieve independence with occupational performance  Pt will have support from family  Pt adequate for d/c home with family support and services  No further concerns noted  RECOMMENDATIONS: Home with home OT and home health services and support from family      Venkat Correa MS, OTR/L

## 2019-01-29 NOTE — DISCHARGE SUMMARY
Discharge- Jayde Net 1945, 72 y o  male MRN: [id number]    Unit/Bed#: -79 Encounter: [id number]    Primary Care Provider: Felisha Rainey DO   Date and time admitted to hospital: 1/22/2019  3:30 PM        Insomnia   Assessment & Plan    Continue melatonin p r n  History of bacteremia   Assessment & Plan    Recently admitted at [facility] from 1/9/2019 to 1/22/2019 with polymicrobial sepsis/bacteremia with e  Coli, streptococcus and actinomyces secondary to UTI/epididymitis  Per ID recommendation is 14 days of IV antibiotics currently on ampicillin last days today  Patient has completed the treatment  Diabetes mellitus [facility])   Assessment & Plan    Lab Results   Component Value Date    HGBA1C 7 5 (H) 01/14/2019       Recent Labs      01/28/19   1145  01/28/19   1701  01/28/19   2050  01/29/19   0616   POCGLU  275*  169*  146*  163*     Diabetes mellitus  At home on glargine 40 units daily  Continue current dose at 20 units daily and lispro 4 unit t i d  - I will discharge him home on this regimen because he is sugars have been stable on it  Hypertension   Assessment & Plan    Essential hypertension  Continue lisinopril and metoprolol  Hyperlipidemia   Assessment & Plan    Continue atorvastatin     Moderate aortic stenosis   Assessment & Plan    Moderate aortic stenosis     PAD (peripheral artery disease) ([facility])   Assessment & Plan    PAD with history of lower extremity intervention  Continue the aspirin and Plavix     CAD (coronary artery disease)   Assessment & Plan    Coronary artery disease status post CABG; no chest pain  Continue aspirin clopidogrel  and ranexa statin Ace     Urinary retention   Assessment & Plan    Patient had urinary retention and catheter was removed on admission by urology  Continue tamsulosin - no voiding issues     * Ambulatory dysfunction   Assessment & Plan    Ambulatory dysfunction secondary to bacteremia and sepsis    At baseline lives alone in her own house  PT/OT to evaluate and treat- has completed- clear to be discharged today           Discharging Physician / Practitioner: Riley Townsend MD  PCP: Burgess Eloina DO  Admission Date:   Admission Orders     Ordered        01/29/19 1118  Admit Patient to  Once             Discharge Date: 01/29/19    Resolved Problems  Date Reviewed: 1/29/2019    None          Consultations During Hospital Stay:  · None    Procedures Performed:   · PICC removal    Significant Findings / Test Results:   · None    Incidental Findings:   · None     Test Results Pending at Discharge (will require follow up): · None     Outpatient Tests Requested:  · None    Complications:  None    Reason for Admission:  Ambulatory dysfunction    Hospital Course:     Sonya Quintana is a 72 y o  male patient who originally presented to the hospital on 1/22/2019 due to ambulatory dysfunction secondary to being hospitalized for sepsis with bacteremia  Patient is vitals were stable his sugars were stable on the current regimen of Lantus and pre meal insulin  He completed his course of antibiotics PICC line was removed he completed his PT OT medically cleared to be discharged home no urinary retention issues  Please see above list of diagnoses and related plan for additional information  Condition at Discharge: stable     Discharge Day Visit / Exam:     Subjective:  Patient is seen examined denies any chest pain or shortness of breath denies any nausea vomiting diarrhea abdominal pain  Had a BM in the urinated today  Vitals: Blood Pressure: 131/61 (01/29/19 0734)  Pulse: 70 (01/29/19 0734)  Temperature: 97 7 °F (36 5 °C) (01/29/19 0734)  Temp Source: Temporal (01/29/19 0734)  Respirations: 18 (01/29/19 0734)  Height: 5' 8" (172 7 cm) (01/22/19 1856)  Weight - Scale: 68 5 kg (151 lb 0 2 oz) (01/24/19 0617)  SpO2: 98 % (01/29/19 0734)  Exam:   Physical Exam   Constitutional: He is oriented to person, place, and time  He appears well-developed and well-nourished  HENT:   Head: Normocephalic and atraumatic  Eyes: Pupils are equal, round, and reactive to light  EOM are normal    Neck: Normal range of motion  Cardiovascular: Normal rate, regular rhythm and normal heart sounds  Pulmonary/Chest: Effort normal and breath sounds normal    Abdominal: Soft  Bowel sounds are normal    Musculoskeletal: Normal range of motion  He exhibits no edema  Neurological: He is alert and oriented to person, place, and time  Skin: Skin is warm  Discussion with Family:  Patient    Discharge instructions/Information to patient and family:   See after visit summary for information provided to patient and family  Provisions for Follow-Up Care:  See after visit summary for information related to follow-up care and any pertinent home health orders  Disposition:     Home with VNA Services (Reminder: Complete face to face encounter)    For Discharges to [facility] SNF:   · Not Applicable to this Patient - Not Applicable to this Patient    Planned Readmission: no     Discharge Statement:  I spent >35 minutes discharging the patient  This time was spent on the day of discharge  I had direct contact with the patient on the day of discharge  Greater than 50% of the total time was spent examining patient, answering all patient questions, arranging and discussing plan of care with patient as well as directly providing post-discharge instructions  Additional time then spent on discharge activities  Discharge Medications:  See after visit summary for reconciled discharge medications provided to patient and family        ** Please Note: This note has been constructed using a voice recognition system **

## 2019-01-29 NOTE — DISCHARGE INSTRUCTIONS
Meal Planning with Diabetes Exchanges   WHAT YOU NEED TO KNOW:   Exchanges are servings of food that contain similar amounts of carbohydrate, fat, protein, and calories within a food group  The exchanges can be used to develop a healthy meal plan that helps to keep your blood sugar within the recommended levels  A meal plan with the right amount of carbohydrates is especially important  Your blood sugar naturally rises after you eat carbohydrates  Too many carbohydrates in 1 meal or snack can raise your blood sugar level  Carbohydrates are found in starches, fruit, milk, yogurt, and sweets  DISCHARGE INSTRUCTIONS:   How to create a meal plan with exchanges:  A dietitian will work with you to develop a healthy meal plan that is right for you  This meal plan will include the amount of exchanges you can have from each food group throughout the day  Follow your meal plan by keeping track of the amount of exchanges you eat for each meal and snack  Your meal plan will be based on your age, weight, blood sugar levels, medicine, and activity level  Starch food group exchanges:  Each exchange below contains about 15 grams of carbohydrate , 3 grams of protein, 1 gram of fat, and 80 calories  · 1 ounce of white, whole wheat or rye bread (1 slice)    · 1 ounce of bagel (about ¼ of a bagel)    · 1 6-inch flour or corn tortilla or 1 4-inch pancake (about ¼ inch thick)    · ? cup of cooked pasta or rice    · ¾ cup of dry, ready-to-eat cereal with no sugar added     · ½ cup of cooked cereal, such as oatmeal    · 3 elba cracker squares or 8 animal crackers    · 6 saltine-type crackers or     · 3 cups of popcorn or ¾ ounce of pretzels     · Starchy vegetables and cooked legumes:      ¨ ½ cup of corn, green peas, sweet potatoes, or mashed potatoes     ¨ ¼ of a large baked potato     ¨ 1 cup of acorn, butternut squash, or pumpkin     ¨ ½ cup of beans, lentils, or peas (such as borrego, kidney, or black-eyed)    ¨ ?  cup of lima beans  Fruit group exchanges:  Each exchange contains about 15 grams of carbohydrate  and 60 calories  · 1 small (4 ounce) apple, banana orange, or nectarine    · ½ cup of canned or fresh fruit    · ½ cup (4 ounces) of unsweetened fruit juice    · 2 tablespoons of dried fruit  Milk group exchanges:  Each exchange contains about 12 grams of carbohydrate  and 8 grams of protein  The amount of fat and calories in each serving depends on the type of milk (such as whole, low-fat, or fat-free)  · 1 cup fat-free or low-fat milk    · ¾ cup of plain, nonfat yogurt    · 1 cup fat-free, flavored yogurt with artificial (no calorie) sweetener  Non-starchy vegetable group exchanges:  Each exchange contains about 5 grams of carbohydrate , 2 grams of protein, and 25 calories  Examples include beets, broccoli, cabbage, carrots, cauliflower, cucumber, mushrooms, tomatoes, and zucchini  · ½ cup of cooked vegetables or 1 cup of raw vegetables     · ½ cup of vegetable juice  Meat and meat substitute group exchanges:  Each exchange of a lean meat  listed below contains about 7 grams of protein, 0 to 3 grams of fat, and 45 calories  The meat and meat substitutes food group does not contain any carbohydrates  Medium and high-fat meats have more calories  · 1 ounce of chicken or turkey without skin, or 1 ounce of fish (not breaded or fried)     · 1 ounce of lean beef, pork, or lamb     · 1-inch cube or 1 ounce of low-fat cheese     · 2 egg whites or ¼ cup of egg substitute     · ½ cup of tofu  Sweets, desserts, and other carbohydrate group exchanges:   · Sweets and other desserts:  Each exchange has about 15 grams of carbohydrate       ¨ 1 ounce of devante food cake or 2-inch square cake (unfrosted)    ¨ 2 small cookies     ¨ ½ cup of sugar-free, fat-free ice cream    ¨ 1 tablespoon of syrup, jam, jelly, table sugar, or honey    · Combination foods:     ¨ 1 cup of an entrée, such as lasagna, spaghetti with meatballs, macaroni and cheese, and chili with beans (each serving counts as 2 carbohydrate exchanges )     ¨ 1 cup of tomato or vegetable beef soup (each serving counts as 1 carbohydrate exchange )  Fat group exchanges:  Each exchange contains 5 grams of fat and 45 calories  · 1 teaspoon of oil (such as canola, olive, or corn oil)     · 6 almonds or cashews, 10 peanuts, or 4 pecan halves     · 2 tablespoons of avocado     · ½ tablespoon of peanut butter     · 1 teaspoon of regular margarine or 2 teaspoons of low-fat margarine     · 1 teaspoon of regular butter or 1 tablespoon of low-fat butter     · 1 teaspoon of regular mayonnaise or 1 tablespoon of low-fat mayonnaise     · 1 tablespoon of regular salad dressing or 2 tablespoons of low-fat salad dressing  Free foods: The foods on this list are called free foods because they have very few calories  Free foods usually do not increase your blood sugar if you limit them  · 1 tablespoon of catsup or taco sauce     · ¼ cup of salsa     · 2 tablespoons of sugar-free syrup or 2 teaspoons of light jam or jelly     · 1 tablespoon of fat-free salad dressing     · 4 tablespoons of fat-free margarine or fat-free mayonnaise     · Sugar-free drinks: diet soda, sugar-free drink mixes, or mineral water     · Low-sodium bouillon or fat-free broth     · Mustard     · Seasonings such as spices, herbs, and garlic     · Sugar-free gelatin without added fruit  Other healthy nutrition guidelines:   · Eat more fiber  Choose foods that are good sources of fiber, such as fruits, vegetables, and whole grains  Cereals that contain 5 or more grams of fiber per serving are good sources of fiber  Legumes such as garbanzo, borrego beans, kidney beans, and lentils are also good sources  · Limit fat  Ask your dietitian or healthcare provider how much fat you should eat each day  Choose foods low in fat, saturated fat, trans fat, and cholesterol   Examples include turkey or chicken without the skin, fish, lean cuts of meat, and beans  Low-fat dairy foods, such as low-fat or fat-free milk and low-fat yogurt are also good choices  Omega-3 fatty acids are healthy fats that are found in canola oil, soybean oil and fatty fish  [place], albacore tuna, and sardines are good sources of omega 3 fatty acids  Eat 2 servings of these types of fish each week  Do not eat fried fish  · Limit sugar  Sugar and sweets must be counted toward the carbohydrate exchanges that you can have within your meal plan  Limit sugar and sweets because they are usually also high in calories and fat  Eat smaller portions of sweets by sharing a dessert or asking for a child-size portion at a restaurant  · Limit sodium  (salt) to about 2,300 mg per day  You may need to eat even less sodium if you have certain medical conditions  Foods high in sodium include soy sauce, potato chips, and soup  · Limit alcohol  Ask your healthcare provider if it is safe for you to drink alcohol  If alcohol is safe for you to have, eat a meal when you drink alcohol  If you drink alcohol on an empty stomach, your blood sugar may drop to a low level  Women should limit alcohol to 1 drink per day  Men should limit alcohol to 2 drinks per day  A drink of alcohol is 5 ounces of wine, 12 ounces of beer, or 1½ ounces of liquor  Other ways to manage your diabetes:   · Control your blood sugar level  Test your blood sugar level regularly and keep a record of the results  Ask your healthcare provider when and how often to test your blood sugar  You may need to check your blood sugar level at least 3 times each day  · Talk to your healthcare provider about your weight  Ask if you need to lose weight, and how much you need to lose  If you are overweight, you may need to make other changes to lose weight  Ask your healthcare provider to help you create a weight loss program      · Exercise  can help to control your blood sugar levels and decrease your risk of heart disease   It can also help you lose or maintain your weight  Get at least 30 minutes of exercise, 5 times each week  Do resistance training (using weights) 2 times each week  Do not sit for longer than 90 minutes  Work with your healthcare provider to plan the best exercise program for you  Contact your healthcare provider if:   · You have high blood sugar levels during a certain time of day, or almost all of the time  · You often have low blood sugar levels  · You have questions or concerns about your condition or care  © 2017 [address] [employer]  Information is for End User's use only and may not be sold, redistributed or otherwise used for commercial purposes  All illustrations and images included in CareNotes® are the copyrighted property of [employer]  or [employer]  The above information is an  only  It is not intended as medical advice for individual conditions or treatments  Talk to your doctor, nurse or pharmacist before following any medical regimen to see if it is safe and effective for you  Insulin Glargine (By injection)   Insulin Glargine, Recombinant (IN-pastrana-harshad GLAR-jeen, ree-KOM-bi-nant)  Treats diabetes  Brand Name(s): Basaglar, Lantus, Lantus SoloStar, Toujeo   There may be other brand names for this medicine  When This Medicine Should Not Be Used: This medicine is not right for everyone  Do not use it if you had an allergic reaction to insulin glargine  How to Use This Medicine:   Injectable  · Your healthcare provider will work with you to personalize your dose and treatment based on your insulin needs and lifestyle  You will be taught how to give yourself the injections  Make sure you understand all instructions  Ask the doctor, nurse, or pharmacist if you have questions  · If you use insulin once a day, it is best to use it at about the same time every day  · Always double-check both the concentration (strength) of your insulin and your dose   Concentration and dose are not the same  The dose is how many units of insulin you will use  The concentration tells how many units of insulin are in each milliliter (mL), such as 100 units/mL (U-100), but this does not mean you will use 100 units at a time  · Read and follow the patient instructions that come with this medicine  Talk to your doctor or pharmacist if you have any questions  · This medicine should look clear before you use it  Do not shake the vial  Do not mix this medicine with any other insulin or with water  · You will be shown the body areas where this shot can be given  Use a different body area each time you give yourself a shot  Keep track of where you give each shot to make sure you rotate body areas  · Use a new needle and syringe each time you inject your medicine  If you use a syringe, use only the kind that is made for insulin injections  Some insulin must be given with a specific type of syringe or needle  Ask your pharmacist if you are not sure which one to use  · Always check the label before use, to make sure you have the correct type of insulin  Do not change the brand, type, or concentration unless your doctor tells you to  If you use a pump or other device, make sure the insulin is made for that device  · Unopened medicine: Store the vials, cartridges, and SoloStar® pens in the refrigerator  Protect from light  Do not freeze  · Opened medicine:   ¨ Vials: Store in the refrigerator or at room temperature in a cool place, away from sunlight and heat  Use within 28 days  ¨ Cartridge or [employer]: Store at room temperature, away from direct heat and light  Do not refrigerate  Throw away any opened cartridge or Lantus® SoloStar® pen after 28 days  Throw away any opened [employer] after 42 days  · Throw away used needles in a hard, closed container that the needles cannot poke through  Keep this container away from children and pets    Drugs and Foods to Avoid:   Ask your doctor or pharmacist before using any other medicine, including over-the-counter medicines, vitamins, and herbal products  · Some medicines can change the amount of insulin you need to use and make it harder for you to control your diabetes  Tell your doctor about all other medicines that you are using  · Do not drink alcohol while you are using this medicine  Warnings While Using This Medicine:   · Tell your doctor if you are pregnant or breastfeeding, or if you have kidney disease, liver disease, heart disease, or heart failure  · This medicine may cause the following problems:  ¨ Low blood sugar or low potassium levels in the blood  ¨ Fluid retention or heart failure (when used with thiazolidinedione [TZD] medicine)  · Never share insulin pens, needles, or cartridges with anyone  Sharing these can pass hepatitis viruses, HIV, or other illnesses from one person to another  · Keep all medicine out of the reach of children  Never share your medicine with anyone  Possible Side Effects While Using This Medicine:   Call your doctor right away if you notice any of these side effects:  · Allergic reaction: Itching or hives, swelling in your face or hands, swelling or tingling in your mouth or throat, chest tightness, trouble breathing  · Dry mouth, increased thirst, muscle cramps, nausea or vomiting, uneven heartbeat  · Rapid weight gain, swelling in your hands, ankles, or feet, trouble breathing, tiredness  · Shaking, trembling, sweating, fast or pounding heartbeat, lightheadedness, hunger, confusion  If you notice these less serious side effects, talk with your doctor:   · Redness, pain, itching, swelling, or any skin changes where the shot was given  If you notice other side effects that you think are caused by this medicine, tell your doctor  Call your doctor for medical advice about side effects   You may report side effects to FDA at 9-016-FDA-2403  © 2017 [address] [employer] Information is for End User's use only and may not be sold, redistributed or otherwise used for commercial purposes  The above information is an  only  It is not intended as medical advice for individual conditions or treatments  Talk to your doctor, nurse or pharmacist before following any medical regimen to see if it is safe and effective for you  How to Give an Insulin Injection   AMBULATORY CARE:   Insulin syringes  come in different sizes depending on the dose of insulin you need  Your healthcare provider or pharmacist will help you find the right size syringe  Use the correct size insulin syringe to make sure you get the right dose of insulin  Contact your healthcare provider if:   · You feel or see hard lumps in your skin where you inject your insulin  · You think you gave yourself too much or not enough insulin  · Your injections are very painful  · You see blood or clear fluid on your injection site more than once after you inject insulin  · You have questions about how to give the injection  · You cannot afford to buy your diabetes supplies  · You have questions or concerns about your condition or care  Where to inject insulin:   · You can inject insulin into your abdomen, upper arm, buttocks, hip, and the front or side of the thigh  Insulin works fastest when it is injected into the abdomen  · Do not inject insulin into areas where you have a wound or bruising  Insulin injected into wounds or bruises may not get into your body correctly  · Use a different area within the site each time you inject insulin  For example, inject insulin into different areas in your abdomen  Insulin injected into the same area can cause lumps, swelling, or thickened skin  How to inject insulin with a syringe:   · Clean the skin where you will inject the insulin  You can use an alcohol pad or a cotton swab dipped in alcohol  · Grab a fold of your skin    Gently pinch the skin and fat between your thumb and first finger  · Insert the needle straight into your skin  Do not hold the syringe at an angle  Make sure the needle is all the way into the skin  Let go of the pinched tissue  · Push down on the plunger to inject the insulin  Press on the plunger until the insulin is gone  Keep the needle in place for 5 seconds after you inject the insulin  · Pull out the needle  Press on your injection site for 5 to 10 seconds  Do not rub  This will keep insulin from leaking out  · Throw away your used insulin syringe as directed  Do not recap the syringe before you throw it away  Decrease pain when you inject insulin:   · Inject insulin at room temperature  If the insulin has been stored in the refrigerator, remove it 30 minutes before you inject it  · Remove all air bubbles from the syringe before the injection  · If you clean your skin with an alcohol pad, wait until it has dried before you inject insulin  · Relax the muscles at the injection site  · Do not change the direction of the needle during insertion or removal   Reuse your syringe only as directed: You may increase your risk for a bacterial infection when you reuse syringes  Ask your healthcare provider if it is safe for you to reuse a syringe  Do not reuse a syringe if you have an open wound, trouble seeing, or have an infection  The following are tips on how to safely reuse a syringe:  · Recap the needle as soon as you are done using it  Place the cap on a table or hard surface and slide the needle into the cap  · Do not let the needle touch anything but clean skin or the top of the insulin bottle  · Never  share syringes with anyone  · Do not clean your needle with alcohol  This will remove the coating that helps your needle slide easily into your skin  · Throw out any syringe that bends or touches anything other than clean skin    Where to get rid of used syringes:  Ask your healthcare provider where to get rid of your syringes  He may tell you to place the syringe in a heavy-duty laundry detergent bottle or a metal coffee can  The container should have a cap that fits securely  Ask your local waste authority if you need to follow certain rules for getting rid of your syringes  Bring your used syringes home with you when you travel  Pack them in a plastic or metal container with a secure lid  Follow up with your healthcare provider as directed:  Write down your questions so you remember to ask them during your visits  © 2017 [address] [employer] Information is for End User's use only and may not be sold, redistributed or otherwise used for commercial purposes  All illustrations and images included in CareNotes® are the copyrighted property of [employer]  or [employer]one  The above information is an  only  It is not intended as medical advice for individual conditions or treatments  Talk to your doctor, nurse or pharmacist before following any medical regimen to see if it is safe and effective for you  Type 2 Diabetes in Adults   WHAT YOU NEED TO KNOW:   Type 2 diabetes is a disease that affects how your body uses glucose (sugar)  Normally, when the blood sugar level increases, the pancreas makes more insulin  Insulin helps move sugar out of the blood so it can be used for energy  Type 2 diabetes develops because either the body cannot make enough insulin, or it cannot use the insulin correctly  After many years, your pancreas may stop making insulin     DISCHARGE INSTRUCTIONS:   Call 911 for any of the following:   · You have any of the following signs of a stroke:      ¨ Numbness or drooping on one side of your face     ¨ Weakness in an arm or leg    ¨ Confusion or difficulty speaking    ¨ Dizziness, a severe headache, or vision loss    · You have any of the following signs of a heart attack:      ¨ Squeezing, pressure, or pain in your chest that lasts longer than 5 minutes or returns    ¨ Discomfort or pain in your back, neck, jaw, stomach, or arm     ¨ Trouble breathing    ¨ Nausea or vomiting    ¨ Lightheadedness or a sudden cold sweat, especially with chest pain or trouble breathing  Seek care immediately if:   · You have severe abdominal pain, or the pain spreads to your back  You may also be vomiting  · You have trouble staying awake or focusing  · You are shaking or sweating  · You have blurred or double vision  · Your breath has a fruity, sweet smell  · Your breathing is deep and labored, or rapid and shallow  · Your heartbeat is fast and weak  Contact your healthcare provider if:   · You are vomiting or have diarrhea  · You have an upset stomach and cannot eat the foods on your meal plan  · You feel weak or more tired than usual      · You feel dizzy, have headaches, or are easily irritated  · Your skin is red, warm, dry, or swollen  · You have a wound that does not heal      · You have numbness in your arms or legs  · You have trouble coping with your illness, or you feel anxious or depressed  · You have questions or concerns about your condition or care  Medicines: You may  need any of the following:  · Hypoglycemic medicines or insulin  may be given to decrease the amount of sugar in your blood  · Blood pressure medicine  may be given to lower your blood pressure  Your blood pressure should be less than 140/90  · Cholesterol lowering medicine  may be given to prevent heart disease  · Antiplatelets , such as aspirin, help prevent blood clots  Take your antiplatelet medicine exactly as directed  These medicines make it more likely for you to bleed or bruise  If you are told to take aspirin, do not take acetaminophen or ibuprofen instead  · Take your medicine as directed  Contact your healthcare provider if you think your medicine is not helping or if you have side effects   Tell him or her if you are allergic to any medicine  Keep a list of the medicines, vitamins, and herbs you take  Include the amounts, and when and why you take them  Bring the list or the pill bottles to follow-up visits  Carry your medicine list with you in case of an emergency  Check your blood sugar level as directed: You will be taught how to use a glucose monitor  Ask your healthcare provider when and how often to check during the day  You will need to check your blood sugar level at least 3 times each day if you are on insulin  If you check your blood sugar level before a meal , it should be between 80 and 130 mg/dL  If you check your blood sugar level 1 to 2 hours after a meal , it should be less than 180 mg/dL  Ask your healthcare provider if these are good goals for you  Write down your results, and show them to your healthcare provider  He may use the results to make changes to your medicine, food, or exercise schedules  If your blood sugar level is too low: Your blood sugar level is too low if it goes below 70 mg/dL  If the level is too low, eat or drink 15 grams of fast-acting carbohydrate  These are found naturally in fruits  Fast-acting carbohydrates will raise your blood sugar level quickly  Examples of 15 grams of fast-acting carbohydrate are 4 ounces (½ cup) of fruit juice or 4 ounces of regular soda  Other examples are 2 tablespoons of raisins or 3 to 4 glucose tablets  Check your blood sugar level 15 minutes later  If the level is still low (less than 100 mg/dL), eat another 15 grams of carbohydrate  When the level returns to 100 mg/dL, eat a snack or meal that contains carbohydrates  This will help prevent another drop in blood sugar  Always carefully follow your healthcare provider's instructions on how to treat low blood sugar levels  Wear medical alert identification:  Wear medical alert jewelry or carry a card that says you have diabetes  Ask your healthcare provider where to get these items         Check your feet each day for sores:  Wear shoes and socks that fit correctly  Do not trim your toenails  Ask your healthcare provider for more information about foot care  Maintain a healthy weight:  Ask your healthcare provider how much you should weigh  A healthy weight can help you control your diabetes  Ask your provider to help you create a weight loss plan if you are overweight  Together you can set manageable weight loss goals  Follow your meal plan:  A dietitian will help you make a meal plan to keep your blood sugar level steady  Do not skip meals  Your blood sugar level may drop too low if you have taken diabetes medicine and do not eat  · Keep track of carbohydrates (sugar and starchy foods)  Your blood sugar level can get too high if you eat too many carbohydrates  Your dietitian will help you plan meals and snacks that have the right amount of carbohydrates  · Eat low-fat foods , such as skinless chicken and low-fat milk  · Eat less sodium (salt)  Limit high-sodium foods, such as soy sauce, potato chips, and soup  Do not add salt to food you cook  Limit your use of table salt  You should have less than 2,300 mg of sodium per day  · Eat high-fiber foods , such as vegetables, whole grain breads, and beans  · Limit alcohol  Alcohol affects your blood sugar level and can make it harder to manage your diabetes  Limit alcohol to 1 drink a day if you are a woman  Limit alcohol to 2 drinks a day if you are a man  A drink of alcohol is 12 ounces of beer, 5 ounces of wine, or 1½ ounces of liquor  Exercise as directed:  Exercise can help keep your blood sugar level steady, decrease your risk of heart disease, and help you lose weight  Stretch before and after you exercise  Exercise for at least 150 minutes every week  Spread this amount of exercise over at least 3 days a week  Do not skip exercise more than 2 days in a row  Include muscle strengthening activities 2 to 3 days each week   Older adults should include balance training 2 to 3 times each week  Activities that help increase balance include yoga and ann chi  Work with your healthcare provider to create an exercise plan  · Check your blood sugar level before and after exercise  Healthcare providers may tell you to change the amount of insulin you take or food you eat  If your blood sugar level is high, check your blood or urine for ketones before you exercise  Do not exercise if your blood sugar level is high and you have ketones  · If your blood sugar level is less than 100 mg/dL, have a carbohydrate snack before you exercise  Examples are 4 to 6 crackers, ½ banana, 8 ounces (1 cup) of milk, or 4 ounces (½ cup) of juice  Drink water or liquids that do not contain sugar before, during, and after exercise  Ask your dietitian or healthcare provider which liquids you should drink when you exercise  · Do not sit for longer than 30 minutes  If you cannot walk around, at least stand up  This will help you stay active and keep your blood circulating  Do not smoke:  Nicotine and other chemicals in cigarettes and cigars can cause lung damage and make it more difficult to manage your diabetes  Ask your healthcare provider for information if you currently smoke and need help to quit  Do not use e-cigarettes or smokeless tobacco in place of cigarettes or to help you quit  They still contain nicotine  Check your blood pressure as directed:  Ask your healthcare provider what your blood pressure should be  Most adults with diabetes and high blood pressure should have a systolic blood pressure (first number) less than 140  Your diastolic blood pressure (second number) should be less than 90  Ask about vaccines: You have a higher risk for serious illness if you get the flu, pneumonia, or hepatitis  Ask your healthcare provider if you should get a flu, pneumonia, or hepatitis B vaccine, and when to get the vaccine     Follow up with your healthcare provider as directed: You may need to return to have your A1c checked every 3 months  You will need to return at least once each year to have your feet checked  You will need an eye exam once a year to check for retinopathy  You will also need urine tests every year to check for kidney problems  You may need tests to monitor for heart disease such as an EKG, stress test, blood pressure monitoring, and blood tests  Write down your questions so you remember to ask them during your visits  © 2017 [address] [employer] Information is for End User's use only and may not be sold, redistributed or otherwise used for commercial purposes  All illustrations and images included in CareNotes® are the copyrighted property of A D A M , Inc  or Gorge Malone  The above information is an  only  It is not intended as medical advice for individual conditions or treatments  Talk to your doctor, nurse or pharmacist before following any medical regimen to see if it is safe and effective for you  How to Stop Smoking   AMBULATORY CARE:   You will improve your health and the health of others around you  if you stop smoking  Your risk for heart and lung disease, cancer, stroke, heart attack, and vision problems will also decrease  You can benefit from quitting no matter how long you have smoked  Prepare to stop smoking:  Nicotine is a highly addictive drug found in cigarettes  Withdrawal symptoms can happen when you stop smoking and make it hard to quit  These include anxiety, depression, irritability, trouble sleeping, and increased appetite  You increase your chances of success if you prepare to quit  · Set a quit date  [place] a date that is within the next 2 weeks  Do not pick a day that you think may be stressful or busy  Write down the day or [place] it on your calender  · Tell friends and family that you plan to quit  Explain that you may have withdrawal symptoms when you try to quit   Ask them to support you  They may be able to encourage you and help reduce your stress to make it easier for you to quit  · Make a list of your reasons for quitting  Put the list somewhere you will see it every day, such as your refrigerator  You can look at the list when you have a craving  · Remove all tobacco and nicotine products from your home, car, and workplace  Also, remove anything else that will tempt you to smoke, such as lighters, matches, or ashtrays  Clean your car, home, and places at work that smell like smoke  The smell of smoke can trigger a craving  · Identify triggers that make you want to smoke  This may include activities, feelings, or people  Also write down 1 way you can deal with each of your triggers  For example, if you want to smoke as soon as you wake up, plan another activity during this time, such as exercise  · Make a plan for how you will quit  Learn about the tools that can help you quit, such as medicine, counseling, or nicotine replacement therapy  Choose at least 2 options to help you quit  Tools to help you stop smoking:   · Counseling  from a trained healthcare provider can provide you with support and skills to quit smoking  The provider will also teach you to manage your withdrawal symptoms and cravings  You may receive counseling from one counselor, in group therapy, or through phone therapy called a quit line  · Nicotine replacement therapy (NRT)  such as nicotine patches, gum, or lozenges may help reduce your nicotine cravings  You may get these without a doctor's order  Do not use e-cigarettes or smokeless tobacco in place of cigarettes or to help you quit  They still contain nicotine  · Prescription medicines  such as nasal sprays or nicotine inhalers may help reduce your withdrawal symptoms  Other medicines may also be used to reduce your urge to smoke  Ask your healthcare provider about these medicines   You may need to start certain medicines 2 weeks before your quit date for them to work well  · Hypnosis  is a practice that helps guide you through thoughts and feelings  Hypnosis may help decrease your cravings and make you more willing to quit  · Acupuncture therapy  uses very thin needles to balance energy channels in the body  This is thought to help decrease cravings and symptoms of nicotine withdrawal      · Support groups  let you talk to others who are trying to quit or have already quit  It may be helpful to speak with others about how they quit  Manage your cravings:   · Avoid situations, people, and places that tempt you to smoke  Go to nonsmoking places, such as libraries or restaurants  Understand what tempts you and try to avoid these things  · Keep your hands busy  Hold things such as a stress ball or pen  · Put candy or toothpicks in your mouth  Keep lollipops, sugarless gum, or toothpicks with you at all times  · Do not have alcohol or caffeine  These drinks may tempt you to smoke  Drink healthy liquids such as water or juice instead  · Reward yourself when you resist your cravings  Rewards will motivate you and help you stay positive  · Do an activity that distracts you from your craving  Examples include going for a walk, exercising, or cleaning  Prevent weight gain after you quit:  You may gain a few pounds after you quit smoking  It is healthier for you to gain a few pounds than to continue to smoke  The following can help you prevent weight gain:  · Eat healthy foods  These include fruits, vegetables, whole-grain breads, low-fat dairy products, beans, lean meats, and fish  Eat healthy snacks, such as low-fat yogurt, if you get hungry between meals  · Drink water before, during, and between meals  This will make your stomach feel full and help prevent you from overeating  Ask your healthcare provider how much liquid to drink each day and which liquids are best for you  · Exercise    Take a walk or do some kind of exercise every day  Ask your healthcare provider what exercise is right for you  This may help reduce your cravings and reduce stress  For more support and information:   · Smokefree  gov  Phone: 6- 899 - 970-0848  Web Address: www smokefree  [employer].com  © 2017 [address]  Information is for End User's use only and may not be sold, redistributed or otherwise used for commercial purposes  All illustrations and images included in CareNotes® are the copyrighted property of [employer] , [employer]  or Gorge Malone  The above information is an  only  It is not intended as medical advice for individual conditions or treatments  Talk to your doctor, nurse or pharmacist before following any medical regimen to see if it is safe and effective for you

## 2019-01-30 NOTE — TELEPHONE ENCOUNTER
Spoke with patient's daughter  Per daughter, she is leaving for vacation tomorrow, will be back on 2/11/19  Per daughter, she will need to bring patient   Appointment for hospital follow up and PVR scheduled for 2/14/19 at 11:15 in the [facility] office with Mike Martell PA-C (R)

## 2019-01-30 NOTE — NURSING NOTE
This note is day after discharge  Pt discharged in no distress  No c/o chest pain or respiratory distress  Diabetic care, urology care  Follow up with nursing and medical dr Catrachito Lomeli pain in lower legs due to circulatory pain  Pt will follow up   Pt states tylenol not very effective   Discharge no distress

## 2019-01-30 NOTE — PHYSICAL THERAPY NOTE
Physical Therapy Discharge Summary      Pt is discharged from skilled PT  Pt left [facility] prematurely to home on 1/29/19  Otherwise would have continued skilled PT until all STG's/LTG's were consistently achieved     Since 1/23/19 PT Jared, pt was seen for 6/6 skilled PT tx sessions for therex, theract, and gait/elevation training  Pt with good progress in skilled PT this past week  Improvement assessed with: functional strength, balance, activity tolerance, sit <->supine transfers, sit <->stand transfers, SPT's and gait  Practiced functional mobility training with a RW, SPC, and no AD  Pt appears safest, steadiest, and with optimal activity tolernace with RW usage  Pt continues to be hindered by fatigue, chronic BLE pain, and dizziness  Please see STG's/LTG's below for details on pt's overall functional mobility status at discharge  STG=LTG     1  Pt will perform all bed mobility skills independently  ACHIEVED    2  Pt will perform all functional transfers modified independent with least restrictive assistive device  ACHIEVED ( sit <->stand transfers and SPT's with RW, SPC, and no AD)    3  Pt will ambulate 300 feet with least restrictive assistive device modified independent to allow for safe mobility on unit and at home  NOT ACHIEVED  CURRENTLY: Ambulated with a RW for 110 to 140 feet with MOD I   CURRENTLY: Ambulated with a SPC for  52 to 100 feet with MOD I       4  Pt will ascend and descend FF of steps with one rail and AD to allow for safe entry into home and to second floor in home modified independent  ACHIEVED  CURRENTLY: Pt negotiated 13 steps with right handrail up, without SPC, and MOD I    CURRENTLY: Pt negotiated 12steps with right handrail up, with SPC, and MOD I    5  Pt will demonstrate proper sequencing with SPC during ambulation to decrease fall risk and improve balance function for level surfaces     NOT ACHIEVED  CURRENTLY: Pt continues to have incorrect sequencing with the cane;  but has no unsteadines    6  Pt will ambulate without assistive device household distances of 50 feet independently  ACHIEVED ( at MOD I ; due to BLE antalgia)  CURRENTLY: Pt ambulated with no AD for 52 feet with MOD I       Recommendations:  1  Home PT to maximize safe Independent mobility within pt's personal environment    2  Supervision and assistance as needed with mobility to decrease risk for falls and injury    2   May benefit from RW usage to decrease risk for falls and injury and optimize activity tolerance; especially for longer distance ambulation

## 2019-01-30 NOTE — TELEPHONE ENCOUNTER
Attempted to call patient's daughter, spoke with her   He will have patient's daughter call to schedule an appointment  Patient can be scheduled next available with and AP for hospital follow up and PVR

## 2019-01-30 NOTE — TELEPHONE ENCOUNTER
Patient was discharged yesterday from [address] [place] [address] unit  Attempted to call patient to schedule hospital follow up  Received busy signal, will try again later to contact patient

## 2019-02-13 NOTE — PROGRESS NOTES
2/14/2019      Chief Complaint   Patient presents with    Urinary Retention     Hosp follow-up from 1/9/19       Assessment and Plan    72 y o  male     1  Urinary retention   - PVR 22mL   - continue Flomax 0 4mg     2  Balanitis   - clotrimazole betamethasone cream twice a day titrated down to as needed    3  Recent sepsis secondary to UTI/epidiymitis  - patient deferred testicular examination     - FU 1 month with PVR at visit       History of Present Illness  Dayanna Vasquez is a 72 y o  male here for follow up evaluation of urinary retention and sepsis secondary to UTI/epidiymitis  He was recently admitted at [facility] from 1/9/2019 to 1/22/2019 with polymicrobial sepsis/bacteremia  Culture revealed E  Coli, streptococcus and actinomyces  Per ID recommendation he [id number] days of IV antibiotics (ampicillin)  He also had urinary retention and fuller was removed while in the hospital  Continues on Flomax 0 4mg  Presents today with a PVR of 22mL  No bothersome LUTs or UTI symptoms  Review of Systems   Constitutional: Negative for activity change, chills and fever  Gastrointestinal: Negative for abdominal distention and abdominal pain  Musculoskeletal: Negative for back pain and gait problem  Psychiatric/Behavioral: Negative for behavioral problems and confusion         Past Medical History  Past Medical History:   Diagnosis Date    CAD (coronary artery disease)     [facility]     Diabetes mellitus ([facility] )     History of bacteremia     Hyperlipidemia     Hypertension     Moderate aortic stenosis     [facility]     UTI (urinary tract infection)        Past Social History  Past Surgical History:   Procedure Laterality Date    APPENDECTOMY      CORONARY ANGIOPLASTY WITH STENT PLACEMENT      CORONARY ARTERY BYPASS GRAFT       Social History     Tobacco Use   Smoking Status Former Smoker    Packs/day: 1 50    Years: 10 00    Pack years: 15 00    Types: Cigarettes    Last attempt to quit: 2019    Years since quittin 1   Smokeless Tobacco Never Used       Past Family History  History reviewed  No pertinent family history      Past Social history  Social History     Socioeconomic History    Marital status: Unknown     Spouse name: Not on file    Number of children: Not on file    Years of education: Not on file    Highest education level: Not on file   Occupational History    Not on file   Social Needs    Financial resource strain: Not on file    Food insecurity:     Worry: Not on file     Inability: Not on file    Transportation needs:     Medical: Not on file     Non-medical: Not on file   Tobacco Use    Smoking status: Former Smoker     Packs/day: 1 50     Years: 10 00     Pack years: 15 00     Types: Cigarettes     Last attempt to quit: 2019     Years since quittin 1    Smokeless tobacco: Never Used   Substance and Sexual Activity    Alcohol use: No    Drug use: No    Sexual activity: Not Currently   Lifestyle    Physical activity:     Days per week: Not on file     Minutes per session: Not on file    Stress: Not on file   Relationships    Social connections:     Talks on phone: Not on file     Gets together: Not on file     Attends [employer] service: Not on file     Active member of club or organization: Not on file     Attends meetings of clubs or organizations: Not on file     Relationship status: Not on file    Intimate partner violence:     Fear of current or ex partner: Not on file     Emotionally abused: Not on file     Physically abused: Not on file     Forced sexual activity: Not on file   Other Topics Concern    Not on file   Social History Narrative    Not on file       Current Medications  Current Outpatient Medications   Medication Sig Dispense Refill    aspirin (ECOTRIN LOW STRENGTH) 81 mg EC tablet Take 1 tablet (81 mg total) by mouth daily 30 tablet 0    atorvastatin (LIPITOR) 80 mg tablet Take 1 tablet (80 mg total) by mouth daily with dinner 30 tablet 0    clopidogrel (PLAVIX) 75 mg tablet Take 1 tablet (75 mg total) by mouth daily 30 tablet 0    insulin glargine (LANTUS) 100 units/mL subcutaneous injection Inject 20 Units under the skin daily afternoon 1 Units 0    insulin lispro (HumaLOG) 100 units/mL injection Inject 4 Units under the skin 3 (three) times a day with meals 10 mL 0    lisinopril (ZESTRIL) 10 mg tablet Take 1 tablet (10 mg total) by mouth daily 30 tablet 0    melatonin 3 mg Take 2 tablets (6 mg total) by mouth as needed (insomnia) 15 tablet 0    metoprolol succinate (TOPROL-XL) 25 mg 24 hr tablet Take 3 tablets (75 mg total) by mouth daily 30 tablet 0    omeprazole (PriLOSEC) 20 mg delayed release capsule Take 1 capsule (20 mg total) by mouth 2 (two) times a day 60 capsule 0    ranolazine (RANEXA) 500 mg 12 hr tablet Take 1 tablet (500 mg total) by mouth every 12 (twelve) hours 60 tablet 0    tamsulosin (FLOMAX) 0 4 mg Take 1 capsule (0 4 mg total) by mouth 2 (two) times a day for 90 days 180 capsule 3    aspirin 81 mg chewable tablet Chew 81 mg daily       No current facility-administered medications for this visit  Allergies  No Known Allergies      The following portions of the patient's history were reviewed and updated as appropriate: allergies, current medications, past medical history, past social history, past surgical history and problem list       Vitals  Vitals:    02/14/19 1129   BP: 134/70   BP Location: Right arm   Patient Position: Sitting   Cuff Size: Adult   Pulse: 66   Weight: 68 7 kg (151 lb 6 4 oz)   Height: 5' 8" (1 727 m)       Physical Exam  Constitutional   General appearance: Patient is seated and in no acute distress, well appearing and well nourished  Head and Face   Head and face: Normal     Eyes   Conjunctiva and lids: No erythema, swelling or discharge      Ears, Nose, Mouth, and Throat   Hearing: Normal     Pulmonary   Respiratory effort: No increased work of breathing or signs of respiratory distress  Cardiovascular   Examination of extremities for edema and/or varicosities: Normal     Abdomen   Abdomen: Non-tender, no masses  Genitourinary   Penis uncircumcised with mild balanitis  Musculoskeletal   Gait and station: Normal     Skin   Skin and subcutaneous tissue: Warm, dry, and intact  No visible lesions or rashes    Psychiatric   Judgment and insight: Normal  Recent and remote memory:  Normal  Mood and affect: Normal      Results  Recent Results (from the past 1 hour(s))   POCT Measure PVR    Collection Time: 02/14/19 11:49 AM   Result Value Ref Range    POST-VOID RESIDUAL VOLUME, ML POC 22 mL   ]  No results found for: PSA  Lab Results   Component Value Date    GLUCOSE 212 (H) 01/09/2019    CALCIUM 8 2 (L) 01/22/2019    K 3 9 01/22/2019    CO2 25 01/22/2019     01/22/2019    BUN 10 01/22/2019    CREATININE 0 69 01/22/2019     Lab Results   Component Value Date    WBC 9 50 01/22/2019    HGB 11 1 (L) 01/22/2019    HCT 33 7 (L) 01/22/2019    MCV 96 01/22/2019     (H) 01/22/2019       Orders  Orders Placed This Encounter   Procedures    POCT Measure PVR

## 2019-02-14 ENCOUNTER — OFFICE VISIT (OUTPATIENT)
Dept: UROLOGY | Facility: CLINIC | Age: 74
End: 2019-02-14
Payer: COMMERCIAL

## 2019-02-14 VITALS
HEART RATE: 66 BPM | BODY MASS INDEX: 22.94 KG/M2 | DIASTOLIC BLOOD PRESSURE: 70 MMHG | WEIGHT: 151.4 LBS | HEIGHT: 68 IN | SYSTOLIC BLOOD PRESSURE: 134 MMHG

## 2019-02-14 DIAGNOSIS — R33.9 URINARY RETENTION: Primary | ICD-10-CM

## 2019-02-14 DIAGNOSIS — N48.1 BALANITIS: ICD-10-CM

## 2019-02-14 LAB — POST-VOID RESIDUAL VOLUME, ML POC: 22 ML

## 2019-02-14 PROCEDURE — 99213 OFFICE O/P EST LOW 20 MIN: CPT | Performed by: PHYSICIAN ASSISTANT

## 2019-02-14 PROCEDURE — 51798 US URINE CAPACITY MEASURE: CPT | Performed by: PHYSICIAN ASSISTANT

## 2019-02-14 RX ORDER — CLOTRIMAZOLE AND BETAMETHASONE DIPROPIONATE 10; .64 MG/G; MG/G
CREAM TOPICAL 2 TIMES DAILY
Qty: 30 G | Refills: 1 | Status: SHIPPED | OUTPATIENT
Start: 2019-02-14

## 2019-02-14 RX ORDER — TAMSULOSIN HYDROCHLORIDE 0.4 MG/1
0.4 CAPSULE ORAL 2 TIMES DAILY
Qty: 180 CAPSULE | Refills: 3 | Status: SHIPPED | OUTPATIENT
Start: 2019-02-14 | End: 2019-05-23 | Stop reason: SDUPTHER

## 2019-03-22 ENCOUNTER — OFFICE VISIT (OUTPATIENT)
Dept: UROLOGY | Facility: CLINIC | Age: 74
End: 2019-03-22
Payer: COMMERCIAL

## 2019-03-22 VITALS
WEIGHT: 167 LBS | HEART RATE: 60 BPM | DIASTOLIC BLOOD PRESSURE: 50 MMHG | SYSTOLIC BLOOD PRESSURE: 90 MMHG | BODY MASS INDEX: 25.39 KG/M2

## 2019-03-22 DIAGNOSIS — R33.9 URINARY RETENTION: Primary | ICD-10-CM

## 2019-03-22 DIAGNOSIS — N48.1 BALANITIS: ICD-10-CM

## 2019-03-22 DIAGNOSIS — Z87.898 HISTORY OF BACTEREMIA: ICD-10-CM

## 2019-03-22 PROBLEM — N30.00 ACUTE CYSTITIS WITHOUT HEMATURIA: Status: RESOLVED | Noted: 2019-01-09 | Resolved: 2019-03-22

## 2019-03-22 PROBLEM — N36.8 URETHRAL BLEEDING: Status: RESOLVED | Noted: 2019-01-21 | Resolved: 2019-03-22

## 2019-03-22 LAB — POST-VOID RESIDUAL VOLUME, ML POC: 18 ML

## 2019-03-22 PROCEDURE — 51798 US URINE CAPACITY MEASURE: CPT | Performed by: PHYSICIAN ASSISTANT

## 2019-03-22 PROCEDURE — 99213 OFFICE O/P EST LOW 20 MIN: CPT | Performed by: PHYSICIAN ASSISTANT

## 2019-03-22 RX ORDER — HYDROCHLOROTHIAZIDE 25 MG/1
25 TABLET ORAL DAILY
COMMUNITY

## 2019-03-22 RX ORDER — CILOSTAZOL 100 MG/1
100 TABLET ORAL 2 TIMES DAILY
COMMUNITY

## 2019-03-22 RX ORDER — ISOSORBIDE DINITRATE 10 MG/1
10 TABLET ORAL 3 TIMES DAILY
COMMUNITY

## 2019-03-22 RX ORDER — NITROGLYCERIN 0.4 MG/1
0.4 TABLET SUBLINGUAL
COMMUNITY
Start: 2018-11-12

## 2019-03-22 NOTE — PROGRESS NOTES
3/22/2019      Chief Complaint   Patient presents with    Urinary Retention       Assessment and Plan    72 y o  male    1  History of urinary retention in the setting of sepsis secondary to UTI/epididymitis   - PVR 18 mL   - continue Flomax 0 4mg BID       2  Balanitis   - clotrimazole betamethasone cream twice a day titrated down to as needed, patient states this is not working well for him but he defers examination or alternative treatment I e  circumcision      FU 6 month with PVR at visit       History of Present Illness  Wilman Johnson is a 72 y o  male here for follow up evaluation of  urinary retention and sepsis secondary to UTI/epidiymitis  He was recently admitted at [address] [place] [address] from 1/9/2019 to 1/22/2019 with polymicrobial sepsis/bacteremia  Culture revealed E  Coli, streptococcus and actinomyces  Per ID recommendation he [id number] days of IV antibiotics (ampicillin)  He also had urinary retention and fuller was removed while in the hospital  Continues on Flomax 0 4mg  Presents today with a PVR of 18 mL  No bothersome LUTs or UTI symptoms  Does have continued balanitis for which he is taking clotrimazole betamethasone for  He states this is not working well for him  Review of Systems   Constitutional: Negative for activity change, chills and fever  Gastrointestinal: Negative for abdominal distention and abdominal pain  Musculoskeletal: Negative for back pain and gait problem  Psychiatric/Behavioral: Negative for behavioral problems and confusion         Past Medical History  Past Medical History:   Diagnosis Date    CAD (coronary artery disease)     [facility]     Diabetes mellitus ([facility] )     History of bacteremia     Hyperlipidemia     Hypertension     Moderate aortic stenosis     [facility]peripheral [facility]     UTI (urinary tract infection)        Past Social History  Past Surgical History:   Procedure Laterality Date    APPENDECTOMY      CORONARY ANGIOPLASTY WITH STENT PLACEMENT      CORONARY ARTERY BYPASS GRAFT       Social History     Tobacco Use   Smoking Status Former Smoker    Packs/day: 1 50    Years: 10 00    Pack years: 15 00    Types: Cigarettes    Last attempt to quit: 2019    Years since quittin 2   Smokeless Tobacco Never Used       Past Family History  No family history on file      Past Social history  Social History     Socioeconomic History    Marital status: Unknown     Spouse name: Not on file    Number of children: Not on file    Years of education: Not on file    Highest education level: Not on file   Occupational History    Not on file   Social Needs    Financial resource strain: Not on file    Food insecurity:     Worry: Not on file     Inability: Not on file    Transportation needs:     Medical: Not on file     Non-medical: Not on file   Tobacco Use    Smoking status: Former Smoker     Packs/day: 1 50     Years: 10 00     Pack years: 15 00     Types: Cigarettes     Last attempt to quit: 2019     Years since quittin 2    Smokeless tobacco: Never Used   Substance and Sexual Activity    Alcohol use: No    Drug use: No    Sexual activity: Not Currently   Lifestyle    Physical activity:     Days per week: Not on file     Minutes per session: Not on file    Stress: Not on file   Relationships    Social connections:     Talks on phone: Not on file     Gets together: Not on file     Attends Alevism service: Not on file     Active member of club or organization: Not on file     Attends meetings of clubs or organizations: Not on file     Relationship status: Not on file    Intimate partner violence:     Fear of current or ex partner: Not on file     Emotionally abused: Not on file     Physically abused: Not on file     Forced sexual activity: Not on file   Other Topics Concern    Not on file   Social History Narrative    Not on file       Current Medications  Current Outpatient Medications Medication Sig Dispense Refill    aspirin (ECOTRIN LOW STRENGTH) 81 mg EC tablet Take 1 tablet (81 mg total) by mouth daily 30 tablet 0    atorvastatin (LIPITOR) 80 mg tablet Take 1 tablet (80 mg total) by mouth daily with dinner 30 tablet 0    cilostazol (PLETAL) 100 mg tablet Take 100 mg by mouth 2 (two) times a day      clopidogrel (PLAVIX) 75 mg tablet Take 1 tablet (75 mg total) by mouth daily 30 tablet 0    clotrimazole-betamethasone (LOTRISONE) 1-0 05 % cream Apply topically 2 (two) times a day 30 g 1    hydrochlorothiazide (HYDRODIURIL) 25 mg tablet Take 25 mg by mouth daily      insulin glargine (LANTUS) 100 units/mL subcutaneous injection Inject 20 Units under the skin daily afternoon 1 Units 0    isosorbide dinitrate (ISORDIL) 10 mg tablet Take 10 mg by mouth 3 (three) times a day      metoprolol succinate (TOPROL-XL) 25 mg 24 hr tablet Take 3 tablets (75 mg total) by mouth daily 30 tablet 0    nitroglycerin (NITROSTAT) 0 4 mg SL tablet Place 0 4 mg under the tongue      omeprazole (PriLOSEC) 20 mg delayed release capsule Take 1 capsule (20 mg total) by mouth 2 (two) times a day 60 capsule 0    ranolazine (RANEXA) 500 mg 12 hr tablet Take 1 tablet (500 mg total) by mouth every 12 (twelve) hours 60 tablet 0    tamsulosin (FLOMAX) 0 4 mg Take 1 capsule (0 4 mg total) by mouth 2 (two) times a day for 90 days 180 capsule 3    insulin lispro (HumaLOG) 100 units/mL injection Inject 4 Units under the skin 3 (three) times a day with meals (Patient not taking: Reported on 3/22/2019) 10 mL 0    lisinopril (ZESTRIL) 10 mg tablet Take 1 tablet (10 mg total) by mouth daily (Patient not taking: Reported on 3/22/2019) 30 tablet 0    melatonin 3 mg Take 2 tablets (6 mg total) by mouth as needed (insomnia) (Patient not taking: Reported on 3/22/2019) 15 tablet 0     No current facility-administered medications for this visit          Allergies  No Known Allergies      The following portions of the patient's history were reviewed and updated as appropriate: allergies, current medications, past medical history, past social history, past surgical history and problem list       Vitals  Vitals:    03/22/19 1315   BP: 90/50   Pulse: 60   Weight: 75 8 kg (167 lb)         Physical Exam  Constitutional   General appearance: Patient is seated and in no acute distress, well appearing and well nourished  Head and Face   Head and face: Normal     Eyes   Conjunctiva and lids: No erythema, swelling or discharge  Ears, Nose, Mouth, and Throat   Hearing: Normal     Pulmonary   Respiratory effort: No increased work of breathing or signs of respiratory distress  Cardiovascular   Examination of extremities for edema and/or varicosities: Normal     Abdomen   Abdomen: Non-tender, no masses  Musculoskeletal   Gait and station: Wheelchair bound  Skin   Skin and subcutaneous tissue: Warm, dry, and intact  No visible lesions or rashes    Psychiatric   Judgment and insight: Normal  Recent and remote memory:  Normal  Mood and affect: Normal      Results  Recent Results (from the past 1 hour(s))   POCT Measure PVR    Collection Time: 03/22/19  1:28 PM   Result Value Ref Range    POST-VOID RESIDUAL VOLUME, ML POC 18 mL   ]  No results found for: PSA  Lab Results   Component Value Date    GLUCOSE 212 (H) 01/09/2019    CALCIUM 8 2 (L) 01/22/2019    K 3 9 01/22/2019    CO2 25 01/22/2019     01/22/2019    BUN 10 01/22/2019    CREATININE 0 69 01/22/2019     Lab Results   Component Value Date    WBC 9 50 01/22/2019    HGB 11 1 (L) 01/22/2019    HCT 33 7 (L) 01/22/2019    MCV 96 01/22/2019     (H) 01/22/2019       Orders  Orders Placed This Encounter   Procedures    POCT Measure PVR

## 2019-05-23 DIAGNOSIS — R33.9 URINARY RETENTION: ICD-10-CM

## 2019-05-23 RX ORDER — TAMSULOSIN HYDROCHLORIDE 0.4 MG/1
0.4 CAPSULE ORAL 2 TIMES DAILY
Qty: 180 CAPSULE | Refills: 0 | Status: SHIPPED | OUTPATIENT
Start: 2019-05-23 | End: 2020-03-31 | Stop reason: SDUPTHER

## 2019-09-19 NOTE — PROGRESS NOTES
9/23/2019      Chief Complaint   Patient presents with    Urinary Retention       Assessment and Plan    69 y o  male    1  History of urinary retention in the setting of sepsis secondary to UTI/epididymitis   - PVR 26mL   - continue Flomax 0 4mg BID       2  Balanitis   - resolved on clotrimazole betamethasone cream      FU 1 year with PVR at visit       History of Present Illness  Anne Michaud is a 69 y o  male here for follow up evaluation of urinary retention and sepsis secondary to UTI/epidiymitis  He was recently admitted at [facility] 1/9/2019 to 1/22/2019 with polymicrobial sepsis/bacteremia  Culture revealed E  Coli, streptococcus and actinomyces  Per ID recommendation he [id number] days of IV antibiotics (ampicillin)  He also had urinary retention and fuller was removed while in the hospital  Continues on Flomax 0 4mg  Presents today with a PVR of 26mL  No bothersome LUTs or UTI symptoms  Did have balanitis for which he was taking clotrimazole betamethasone for  He states this is now resolved  Review of Systems   Constitutional: Negative for activity change, chills and fever  Gastrointestinal: Negative for abdominal distention and abdominal pain  Musculoskeletal: Negative for back pain and gait problem  Psychiatric/Behavioral: Negative for behavioral problems and confusion         Past Medical History  Past Medical History:   Diagnosis Date    CAD (coronary artery disease)     [facility]     [facility]     History of bacteremia     Hyperlipidemia     Hypertension     Moderate aortic stenosis     [facility]non-ST elevated [facility] infarction[facility]     [facility]     UTI (urinary tract infection)        Past Social History  Past Surgical History:   Procedure Laterality Date    APPENDECTOMY      CORONARY ANGIOPLASTY WITH STENT PLACEMENT      CORONARY ARTERY BYPASS GRAFT      VASCULAR SURGERY Right Social History     Tobacco Use   Smoking Status Former Smoker    Packs/day: 1 50    Years: 10 00    Pack years: 15 00    Types: Cigarettes    Last attempt to quit: 2019    Years since quittin 7   Smokeless Tobacco Never Used       Past Family History  History reviewed  No pertinent family history      Past Social history  Social History     Socioeconomic History    Marital status: Unknown     Spouse name: Not on file    Number of children: Not on file    Years of education: Not on file    Highest education level: Not on file   Occupational History    Not on file   Social Needs    Financial resource strain: Not on file    Food insecurity:     Worry: Not on file     Inability: Not on file    Transportation needs:     Medical: Not on file     Non-medical: Not on file   Tobacco Use    Smoking status: Former Smoker     Packs/day: 1 50     Years: 10 00     Pack years: 15 00     Types: Cigarettes     Last attempt to quit: 2019     Years since quittin 7    Smokeless tobacco: Never Used   Substance and Sexual Activity    Alcohol use: No    Drug use: No    Sexual activity: Not Currently   Lifestyle    Physical activity:     Days per week: Not on file     Minutes per session: Not on file    Stress: Not on file   Relationships    Social connections:     Talks on phone: Not on file     Gets together: Not on file     Attends Yazdanism service: Not on file     Active member of club or organization: Not on file     Attends meetings of clubs or organizations: Not on file     Relationship status: Not on file    Intimate partner violence:     Fear of current or ex partner: Not on file     Emotionally abused: Not on file     Physically abused: Not on file     Forced sexual activity: Not on file   Other Topics Concern    Not on file   Social History Narrative    Not on file       Current Medications  Current Outpatient Medications   Medication Sig Dispense Refill    aspirin (ECOTRIN LOW STRENGTH) 81 mg EC tablet Take 1 tablet (81 mg total) by mouth daily 30 tablet 0    atorvastatin (LIPITOR) 80 mg tablet Take 1 tablet (80 mg total) by mouth daily with dinner 30 tablet 0    cilostazol (PLETAL) 100 mg tablet Take 100 mg by mouth 2 (two) times a day      clopidogrel (PLAVIX) 75 mg tablet Take 1 tablet (75 mg total) by mouth daily 30 tablet 0    insulin glargine (LANTUS) 100 units/mL subcutaneous injection Inject 20 Units under the skin daily afternoon (Patient taking differently: Inject 30 Units under the skin daily afternoon ) 1 Units 0    isosorbide dinitrate (ISORDIL) 10 mg tablet Take 10 mg by mouth 3 (three) times a day      lisinopril (ZESTRIL) 10 mg tablet Take 1 tablet (10 mg total) by mouth daily 30 tablet 0    metoprolol tartrate (LOPRESSOR) 25 mg tablet Take 75 mg by mouth 2 (two) times a day      nitroglycerin (NITROSTAT) 0 4 mg SL tablet Place 0 4 mg under the tongue      omeprazole (PriLOSEC) 20 mg delayed release capsule Take 1 capsule (20 mg total) by mouth 2 (two) times a day 60 capsule 0    ranolazine (RANEXA) 500 mg 12 hr tablet Take 1 tablet (500 mg total) by mouth every 12 (twelve) hours 60 tablet 0    tamsulosin (FLOMAX) 0 4 mg Take 1 capsule (0 4 mg total) by mouth 2 (two) times a day for 90 days 180 capsule 0    clotrimazole-betamethasone (LOTRISONE) 1-0 05 % cream Apply topically 2 (two) times a day (Patient not taking: Reported on 9/23/2019) 30 g 1    hydrochlorothiazide (HYDRODIURIL) 25 mg tablet Take 25 mg by mouth daily      insulin lispro (HumaLOG) 100 units/mL injection Inject 4 Units under the skin 3 (three) times a day with meals (Patient not taking: Reported on 3/22/2019) 10 mL 0    melatonin 3 mg Take 2 tablets (6 mg total) by mouth as needed (insomnia) (Patient not taking: Reported on 3/22/2019) 15 tablet 0    metFORMIN (GLUCOPHAGE) 1000 MG tablet Take 1,000 mg by mouth daily       No current facility-administered medications for this visit  Allergies  No Known Allergies      The following portions of the patient's history were reviewed and updated as appropriate: allergies, current medications, past medical history, past social history, past surgical history and problem list       Vitals  Vitals:    09/23/19 1421   BP: 118/64   Pulse: 66   Weight: 77 5 kg (170 lb 12 8 oz)       Physical Exam  Constitutional   General appearance: Patient is seated and in no acute distress, well appearing and well nourished  Head and Face   Head and face: Normal     Eyes   Conjunctiva and lids: No erythema, swelling or discharge  Ears, Nose, Mouth, and Throat   Hearing: Normal     Pulmonary   Respiratory effort: No increased work of breathing or signs of respiratory distress  Cardiovascular   Examination of extremities for edema and/or varicosities: Normal     Abdomen   Abdomen: Non-tender, no masses  Musculoskeletal   Gait and station: normal     Skin   Skin and subcutaneous tissue: Warm, dry, and intact  No visible lesions or rashes    Psychiatric   Judgment and insight: Normal  Recent and remote memory:  Normal  Mood and affect: Normal      Results  Recent Results (from the past 1 hour(s))   POCT Measure PVR    Collection Time: 09/23/19  2:42 PM   Result Value Ref Range    POST-VOID RESIDUAL VOLUME, ML POC 26 mL   ]  No results found for: PSA  Lab Results   Component Value Date    GLUCOSE 212 (H) 01/09/2019    CALCIUM 8 2 (L) 01/22/2019    K 3 9 01/22/2019    CO2 25 01/22/2019     01/22/2019    BUN 10 01/22/2019    CREATININE 0 69 01/22/2019     Lab Results   Component Value Date    WBC 9 50 01/22/2019    HGB 11 1 (L) 01/22/2019    HCT 33 7 (L) 01/22/2019    MCV 96 01/22/2019     (H) 01/22/2019       Orders  Orders Placed This Encounter   Procedures    POCT Measure PVR

## 2019-09-23 ENCOUNTER — OFFICE VISIT (OUTPATIENT)
Dept: UROLOGY | Facility: CLINIC | Age: 74
End: 2019-09-23
Payer: COMMERCIAL

## 2019-09-23 VITALS
BODY MASS INDEX: 25.97 KG/M2 | HEART RATE: 66 BPM | WEIGHT: 170.8 LBS | SYSTOLIC BLOOD PRESSURE: 118 MMHG | DIASTOLIC BLOOD PRESSURE: 64 MMHG

## 2019-09-23 DIAGNOSIS — R33.9 URINARY RETENTION: ICD-10-CM

## 2019-09-23 DIAGNOSIS — N48.1 BALANITIS: Primary | ICD-10-CM

## 2019-09-23 LAB — POST-VOID RESIDUAL VOLUME, ML POC: 26 ML

## 2019-09-23 PROCEDURE — 51798 US URINE CAPACITY MEASURE: CPT | Performed by: PHYSICIAN ASSISTANT

## 2019-09-23 PROCEDURE — 99213 OFFICE O/P EST LOW 20 MIN: CPT | Performed by: PHYSICIAN ASSISTANT

## 2020-03-31 DIAGNOSIS — R33.9 URINARY RETENTION: ICD-10-CM

## 2020-03-31 RX ORDER — TAMSULOSIN HYDROCHLORIDE 0.4 MG/1
0.4 CAPSULE ORAL
Qty: 60 CAPSULE | Refills: 0 | Status: SHIPPED | OUTPATIENT
Start: 2020-03-31 | End: 2020-05-22

## 2020-03-31 RX ORDER — TAMSULOSIN HYDROCHLORIDE 0.4 MG/1
0.4 CAPSULE ORAL 2 TIMES DAILY
Qty: 180 CAPSULE | Refills: 2 | Status: SHIPPED | OUTPATIENT
Start: 2020-03-31 | End: 2020-10-20 | Stop reason: SDUPTHER

## 2020-03-31 NOTE — TELEPHONE ENCOUNTER
Patient's daughter, left a message on the Medication Refill voice mail line requesting a new prescription for Tamsulosin 0 4mg  He father is currently completely out of medication  She is requesting a 90 day supply to [facility] mail order pharmacy and a 30 day supply to [facility]/pharmacy on [address] in [facility]  The patient has an upcoming office visit scheduled for 9/24/2020 with Warden Darrin PA-C in the [facility] location but will run out of medication until then    Both requests for a 30 day and 90 day supplies for both, were queued and forwarded to the Advanced Practitioner covering the [address] [address] location for approval

## 2020-04-10 NOTE — TELEPHONE ENCOUNTER
Katalina sent a Clarification Request because the local retail pharmacy processed the 30 day supply instead of letting the patient pay for the prescription so no the mail order is holding the mail order prescription for 3 weeks  They also called to clarify the order to 0 8mg daily whether it be BID or 2 QHS  No further action required

## 2020-05-22 DIAGNOSIS — R33.9 URINARY RETENTION: ICD-10-CM

## 2020-05-22 RX ORDER — TAMSULOSIN HYDROCHLORIDE 0.4 MG/1
CAPSULE ORAL
Qty: 60 CAPSULE | Refills: 0 | Status: SHIPPED | OUTPATIENT
Start: 2020-05-22 | End: 2020-08-21

## 2020-08-20 DIAGNOSIS — R33.9 URINARY RETENTION: ICD-10-CM

## 2020-08-21 RX ORDER — TAMSULOSIN HYDROCHLORIDE 0.4 MG/1
CAPSULE ORAL
Qty: 60 CAPSULE | Refills: 0 | Status: SHIPPED | OUTPATIENT
Start: 2020-08-21

## 2020-10-20 ENCOUNTER — TELEPHONE (OUTPATIENT)
Dept: UROLOGY | Facility: AMBULATORY SURGERY CENTER | Age: 75
End: 2020-10-20

## 2020-10-20 DIAGNOSIS — R33.9 URINARY RETENTION: ICD-10-CM

## 2020-10-20 RX ORDER — TAMSULOSIN HYDROCHLORIDE 0.4 MG/1
0.4 CAPSULE ORAL 2 TIMES DAILY
Qty: 180 CAPSULE | Refills: 3 | Status: SHIPPED | OUTPATIENT
Start: 2020-10-20 | End: 2021-01-18

## 2021-09-08 NOTE — UTILIZATION REVIEW
Continued Stay Review    Date:    1/21/2019    Vital Signs: /70 (BP Location: Left arm)   Pulse 70   Temp 97 7 °F (36 5 °C) (Temporal)   Resp 18   Ht 5' 8" (1 727 m)   Wt 71 7 kg (158 lb 1 1 oz)   SpO2 96%   BMI 24 03 kg/m²      Assessment/Plan:   SIRS (systemic inflammatory response syndrome) (HCC)   Assessment & Plan     Klebsiella bacteremia likely due to urinary source  Appreciate ID help  Recommend IV Ampicillin through 1/22  Has PICC      Bacteremia due to Klebsiella pneumoniae   Assessment & Plan     Thought to be due to urinary source  On IV abx through 1/22 per ID  Has a PICC line  Waiting for rehab placement      Acute cystitis without hematuria   Assessment & Plan     Likely caused the Klebsiella bacteremia  abx as above     Urinary retention   Assessment & Plan     Difficult fuller placed by urology  Now having some bleeding around the fuller  Fuller should remain in place at discharge and have urology take out in the office    I will call urology today to have them re-eval the patient with the penile bleeding           Medications:   Scheduled Meds:   Current Facility-Administered Medications:  acetaminophen 650 mg Oral Q6H PRN Rubia Rios PA-C    ampicillin 2,000 mg Intravenous Q6H Iram Bhat MD Last Rate: 2,000 mg (01/21/19 1156)   aspirin 81 mg Oral Daily Alix Burks MD    atorvastatin 80 mg Oral Daily With Latia Darling MD    bacitracin 1 small application Topical BID Danilo Prechtel, DO    clopidogrel 75 mg Oral Daily Socorro Cai MD    guaiFENesin 200 mg Oral Q4H PRN VARGAS Wooten    insulin glargine 10 Units Subcutaneous HS Jaz Lafleur MD    insulin lispro 1-5 Units Subcutaneous TID AC Kay Hines MD    insulin lispro 1-5 Units Subcutaneous HS Jaz Lafleur MD    lisinopril 10 mg Oral Daily Alix Burks MD    melatonin 6 mg Oral HS VARGAS Wooten    melatonin 6 mg Oral PRN VARGAS Marks    metoprolol succinate 75 mg Oral Daily Katty Kaplan INR is not therapeutic, take 3mg coumadin every day. Recheck on Monday 9/13. Angelique Velasquez MD    nicotine 1 patch Transdermal Daily Marianna Mann PA-C    ondansetron 4 mg Intravenous Q4H PRN Delrae Clas Spatzer, CRNP    ranolazine 500 mg Oral Q12H [facility] Jeff Munoz MD    tamsulosin 0 4 mg Oral Daily With Matt Butt MD      Continuous Infusions:    PRN Meds:   acetaminophen    guaiFENesin    melatonin    ondansetron     Pertinent Labs/Diagnostic Results:   H/H   11 4/33 3    Discharge Plan:   SNF